# Patient Record
Sex: FEMALE | Race: WHITE | Employment: UNEMPLOYED | URBAN - METROPOLITAN AREA
[De-identification: names, ages, dates, MRNs, and addresses within clinical notes are randomized per-mention and may not be internally consistent; named-entity substitution may affect disease eponyms.]

---

## 2018-06-15 ENCOUNTER — HOSPITAL ENCOUNTER (INPATIENT)
Age: 78
LOS: 1 days | Discharge: ANOTHER HOSPITAL | DRG: 057 | End: 2018-06-16
Attending: PSYCHIATRY & NEUROLOGY | Admitting: EMERGENCY MEDICINE
Payer: MEDICARE

## 2018-06-15 PROBLEM — G30.9 DEMENTIA DUE TO ALZHEIMER'S DISEASE (HCC): Status: ACTIVE | Noted: 2018-06-15

## 2018-06-15 PROBLEM — R46.89: Status: ACTIVE | Noted: 2018-06-15

## 2018-06-15 PROBLEM — F02.80 DEMENTIA DUE TO ALZHEIMER'S DISEASE (HCC): Status: ACTIVE | Noted: 2018-06-15

## 2018-06-15 PROCEDURE — 65220000003 HC RM SEMIPRIVATE PSYCH

## 2018-06-15 PROCEDURE — 74011250637 HC RX REV CODE- 250/637: Performed by: INTERNAL MEDICINE

## 2018-06-15 RX ORDER — OLANZAPINE 2.5 MG/1
2.5 TABLET ORAL
Status: DISCONTINUED | OUTPATIENT
Start: 2018-06-15 | End: 2018-06-17 | Stop reason: HOSPADM

## 2018-06-15 RX ORDER — LORATADINE 10 MG/1
10 TABLET ORAL DAILY
Status: DISCONTINUED | OUTPATIENT
Start: 2018-06-16 | End: 2018-06-17 | Stop reason: HOSPADM

## 2018-06-15 RX ORDER — ACETAMINOPHEN 325 MG/1
650 TABLET ORAL
Status: DISCONTINUED | OUTPATIENT
Start: 2018-06-15 | End: 2018-06-17

## 2018-06-15 RX ORDER — FAMOTIDINE 20 MG/1
20 TABLET, FILM COATED ORAL 2 TIMES DAILY
Status: DISCONTINUED | OUTPATIENT
Start: 2018-06-15 | End: 2018-06-15

## 2018-06-15 RX ORDER — CARVEDILOL 12.5 MG/1
12.5 TABLET ORAL 2 TIMES DAILY WITH MEALS
Status: DISCONTINUED | OUTPATIENT
Start: 2018-06-15 | End: 2018-06-16

## 2018-06-15 RX ORDER — FUROSEMIDE 20 MG/1
20 TABLET ORAL
COMMUNITY
End: 2018-07-30

## 2018-06-15 RX ORDER — BENZTROPINE MESYLATE 1 MG/ML
1 INJECTION INTRAMUSCULAR; INTRAVENOUS
Status: DISCONTINUED | OUTPATIENT
Start: 2018-06-15 | End: 2018-06-17 | Stop reason: HOSPADM

## 2018-06-15 RX ORDER — GUAIFENESIN 100 MG/5ML
81 LIQUID (ML) ORAL DAILY
COMMUNITY

## 2018-06-15 RX ORDER — CARVEDILOL 12.5 MG/1
TABLET ORAL 2 TIMES DAILY WITH MEALS
Status: ON HOLD | COMMUNITY
End: 2018-06-17 | Stop reason: DRUGHIGH

## 2018-06-15 RX ORDER — FAMOTIDINE 20 MG/1
20 TABLET, FILM COATED ORAL DAILY
COMMUNITY

## 2018-06-15 RX ORDER — DILTIAZEM HYDROCHLORIDE 120 MG/1
120 CAPSULE, COATED, EXTENDED RELEASE ORAL DAILY
Status: DISCONTINUED | OUTPATIENT
Start: 2018-06-16 | End: 2018-06-17 | Stop reason: HOSPADM

## 2018-06-15 RX ORDER — DILTIAZEM HYDROCHLORIDE 120 MG/1
120 CAPSULE, EXTENDED RELEASE ORAL DAILY
COMMUNITY

## 2018-06-15 RX ORDER — ZOLPIDEM TARTRATE 5 MG/1
5 TABLET ORAL
Status: DISCONTINUED | OUTPATIENT
Start: 2018-06-15 | End: 2018-06-17 | Stop reason: HOSPADM

## 2018-06-15 RX ORDER — QUETIAPINE FUMARATE 100 MG/1
12.5 TABLET, FILM COATED ORAL DAILY
Status: ON HOLD | COMMUNITY
End: 2018-06-17 | Stop reason: DRUGHIGH

## 2018-06-15 RX ORDER — QUETIAPINE FUMARATE 25 MG/1
25 TABLET, FILM COATED ORAL
COMMUNITY
End: 2018-07-30

## 2018-06-15 RX ORDER — GUAIFENESIN 100 MG/5ML
81 LIQUID (ML) ORAL DAILY
Status: DISCONTINUED | OUTPATIENT
Start: 2018-06-16 | End: 2018-06-15

## 2018-06-15 RX ORDER — IBUPROFEN 200 MG
1 TABLET ORAL
Status: DISCONTINUED | OUTPATIENT
Start: 2018-06-15 | End: 2018-06-17 | Stop reason: HOSPADM

## 2018-06-15 RX ORDER — FUROSEMIDE 20 MG/1
20 TABLET ORAL DAILY
Status: DISCONTINUED | OUTPATIENT
Start: 2018-06-16 | End: 2018-06-17 | Stop reason: HOSPADM

## 2018-06-15 RX ORDER — ADHESIVE BANDAGE
30 BANDAGE TOPICAL DAILY PRN
Status: DISCONTINUED | OUTPATIENT
Start: 2018-06-15 | End: 2018-06-17 | Stop reason: HOSPADM

## 2018-06-15 RX ORDER — FAMOTIDINE 20 MG/1
20 TABLET, FILM COATED ORAL DAILY
Status: DISCONTINUED | OUTPATIENT
Start: 2018-06-16 | End: 2018-06-17 | Stop reason: HOSPADM

## 2018-06-15 RX ORDER — GUAIFENESIN 100 MG/5ML
81 LIQUID (ML) ORAL DAILY
Status: DISCONTINUED | OUTPATIENT
Start: 2018-06-16 | End: 2018-06-17 | Stop reason: HOSPADM

## 2018-06-15 RX ORDER — LORATADINE 10 MG/1
10 TABLET ORAL
COMMUNITY
End: 2018-07-30

## 2018-06-15 RX ORDER — BENZTROPINE MESYLATE 1 MG/1
1 TABLET ORAL
Status: DISCONTINUED | OUTPATIENT
Start: 2018-06-15 | End: 2018-06-17 | Stop reason: HOSPADM

## 2018-06-15 RX ADMIN — CARVEDILOL 12.5 MG: 12.5 TABLET, FILM COATED ORAL at 18:45

## 2018-06-15 NOTE — BH NOTES
Pt daughter called to check on pt she left her name and number also left pt other daughter number just in case there was no answer on phone. One of pt daughter's name is Ivonne Sawyer the other daughter name is Socorro Jett. Daughter will like to be called during time of court hearing. Number will be inside pt chart.

## 2018-06-15 NOTE — BH NOTES
Problem: Suicide/Homicide (Adult/Pediatric)  Goal: *STG: Remains safe in hospital  Outcome: Progressing Towards Goal  Pt is visible in the milieu this evening shift. Pt presents as cooperative but showing a lot of dementia confusions. Pt is meds/meals compliant. Pt is feeding self and eating well in the unit. Will continue to monitor q 15 for safety, mood and behavior changes.

## 2018-06-15 NOTE — BH NOTES
Pt is a TDO from a LTC nursing in [facility]. Pt has dementia. Pt is alert but only oriented to herself. Pt does not know her age. Pt was cooperative with admission. Body search completed by [facility], RN has revealed no broken skins. Pt has serious RA among many other medical conditions. Her fingers and toes are deformed as a result. Will monitor q 15 for safety.

## 2018-06-15 NOTE — H&P
History and Physical    Subjective:     Aleida Castillo is a 68 y.o. with past medical hx significant for Alzheimer dementia, Arthritis, CAD, CHF, MI, fibromyalgia, GERD, RA, anxiety disorder. Pt is hospitalized at [facility] as a transfer from [facility]. Pt is admitted to [facility] psych lu for being a danger to others and self due to unspecified dementia with behavior disturbance. Pt denies any acute medical issues. She denies any chest pain or shortness of breath. She denies any fever or chills. Pt is showing no signs of acute distress. Pt denies any acute issues. PMHx: Alzheimer disease, RA, CAD, CHF, MI, Fibromyalgia, GERD, anxiety disorder, allergies. PSHx: CABG    FHx;not available. Social History   Substance Use Topics    Smoking status: Quit cigarrette several years ago    Smokeless tobacco: none    Alcohol use None     Lives in adult home    Prior to Admission medications    Medication Sig Start Date End Date Taking? Authorizing Provider   aspirin 81 mg chewable tablet Take 81 mg by mouth daily. Indications: myocardial infarction prevention   Yes Historical Provider   carvedilol (COREG) 12.5 mg tablet Take  by mouth two (2) times daily (with meals). Indications: VENTRICULAR RATE CONTROL IN ATRIAL FIBRILLATION   Yes Historical Provider   dilTIAZem XR (DILACOR XR) 120 mg XR capsule Take 120 mg by mouth two (2) times a day. Indications: hypertension, VENTRICULAR RATE CONTROL IN ATRIAL FIBRILLATION   Yes Historical Provider   furosemide (LASIX) 20 mg tablet Take 20 mg by mouth daily. Indications: Edema, hypertension   Yes Historical Provider   loratadine (CLARITIN) 10 mg tablet Take 10 mg by mouth daily. Indications: Allergic Rhinitis, SNEEZING   Yes Historical Provider   famotidine (PEPCID) 20 mg tablet Take 20 mg by mouth two (2) times a day. Indications: Heartburn   Yes Historical Provider   QUEtiapine (SEROQUEL) 25 mg tablet Take 25 mg by mouth nightly.  Indications: Generalized Anxiety Disorder Yes Historical Provider   QUEtiapine (SEROQUEL) 100 mg tablet Take 12.5 mg by mouth daily. Indications: Generalized Anxiety Disorder, Rhoda associated with Bipolar Disorder   Yes Historical Provider     Allergies   Allergen Reactions    Cephalosporins Itching    Codeine Hives    Erythromycin Hives    Nubain [Nalbuphine] Hives    Pcn [Penicillins] Hives    Percodan [Oxycodone Hcl-Oxycodone-Asa] Hives    Promethazine Hives    Protonix [Pantoprazole] Diarrhea    Sulfa (Sulfonamide Antibiotics) Hives    Toradol [Ketorolac] Hives    Ultram [Tramadol] Hives    Valium [Diazepam] Hives    Prilosec [Omeprazole] Itching        Review of Systems:  Constitutional: negative  Eyes: negative  Ears, Nose, Mouth, Throat, and Face: negative  Respiratory: negative  Cardiovascular: negative  Gastrointestinal: negative  Genitourinary:negative  Integument/Breast: negative  Hematologic/Lymphatic: negative  Musculoskeletal:negative  Neurological: negative  Behavioral/Psychiatric: unspecified dementia with behavioral disturbances. Endocrine: negative  Allergic/Immunologic: negative     Objective: Intake and Output:            Physical Exam:   Visit Vitals    /77    Pulse 67    Temp 97.2 °F (36.2 °C)    Resp 18    SpO2 96%    Breastfeeding No     General:  Alert, cooperative, no distress, appears stated age. Head:  Normocephalic, without obvious abnormality, atraumatic. Eyes:  Conjunctivae/corneas clear. PERRL, EOMs intact. Ears:  Normal external ear canals both ears. Nose: Nares normal. Septum midline. Mucosa normal. No drainage or sinus tenderness. Throat: Lips, mucosa, and tongue normal. Teeth and gums normal.   Neck: Supple, symmetrical, trachea midline, no adenopathy, thyroid: no enlargement/tenderness/nodules. Back:   Symmetric, no curvature. ROM normal. No CVA tenderness. Lungs:   Clear to auscultation bilaterally. Chest wall:  No tenderness or deformity.    Heart:  Regular rate and rhythm, S1, S2 normal, no murmur, click, rub or gallop. Abdomen:   Soft, non-tender. Bowel sounds normal. No masses,  No organomegaly. Extremities: Sever deformity of UEs and LEs   Pulses: Distal pulses intact. Skin: Skin color, texture, turgor normal. No rashes or lesions   Lymph nodes: Cervical, supraclavicular, and axillary nodes normal.   Neurologic: CNII-XII intact. Normal strength, sensation intact, distal pulses intact, Sun skin demage. Data Review:   No results found for this or any previous visit (from the past 24 hour(s)). 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    Signed By: Mendoza Beck MD     Alondra 15, 2018

## 2018-06-15 NOTE — BH NOTES
GROUP THERAPY PROGRESS NOTE    The patient Caren lew 68 y.o. female is participating in [employer]. Group time: 1 hour    Personal goal for participation:  To concentrate on selected task    Goal orientation: social    Group therapy participation: active    Therapeutic interventions reviewed and discussed: Crafts, games, music    Impression of participation: The patient was attentive-good concentration on selected task    Sadia Rodriguez  6/15/2018  5:19 PM

## 2018-06-16 VITALS
TEMPERATURE: 97.6 F | RESPIRATION RATE: 17 BRPM | SYSTOLIC BLOOD PRESSURE: 99 MMHG | DIASTOLIC BLOOD PRESSURE: 55 MMHG | HEART RATE: 81 BPM | OXYGEN SATURATION: 98 %

## 2018-06-16 PROBLEM — I95.9 HYPOTENSION: Status: ACTIVE | Noted: 2018-06-16

## 2018-06-16 PROCEDURE — 74011250637 HC RX REV CODE- 250/637: Performed by: PSYCHIATRY & NEUROLOGY

## 2018-06-16 PROCEDURE — 74011250636 HC RX REV CODE- 250/636: Performed by: EMERGENCY MEDICINE

## 2018-06-16 PROCEDURE — 74011250637 HC RX REV CODE- 250/637: Performed by: INTERNAL MEDICINE

## 2018-06-16 PROCEDURE — 65220000003 HC RM SEMIPRIVATE PSYCH

## 2018-06-16 RX ORDER — CARVEDILOL 3.12 MG/1
3.12 TABLET ORAL 2 TIMES DAILY WITH MEALS
Status: DISCONTINUED | OUTPATIENT
Start: 2018-06-17 | End: 2018-06-17 | Stop reason: HOSPADM

## 2018-06-16 RX ORDER — QUETIAPINE FUMARATE 25 MG/1
12.5 TABLET, FILM COATED ORAL DAILY
Status: DISCONTINUED | OUTPATIENT
Start: 2018-06-16 | End: 2018-06-16

## 2018-06-16 RX ORDER — QUETIAPINE FUMARATE 25 MG/1
25 TABLET, FILM COATED ORAL
Status: DISCONTINUED | OUTPATIENT
Start: 2018-06-16 | End: 2018-06-16

## 2018-06-16 RX ORDER — QUETIAPINE FUMARATE 25 MG/1
12.5 TABLET, FILM COATED ORAL 2 TIMES DAILY
Status: DISCONTINUED | OUTPATIENT
Start: 2018-06-17 | End: 2018-06-17 | Stop reason: HOSPADM

## 2018-06-16 RX ORDER — QUETIAPINE FUMARATE 25 MG/1
25 TABLET, FILM COATED ORAL EVERY EVENING
Status: DISCONTINUED | OUTPATIENT
Start: 2018-06-16 | End: 2018-06-16

## 2018-06-16 RX ADMIN — FAMOTIDINE 20 MG: 20 TABLET ORAL at 11:03

## 2018-06-16 RX ADMIN — FUROSEMIDE 20 MG: 20 TABLET ORAL at 11:03

## 2018-06-16 RX ADMIN — CARVEDILOL 12.5 MG: 12.5 TABLET, FILM COATED ORAL at 11:03

## 2018-06-16 RX ADMIN — QUETIAPINE FUMARATE 12.5 MG: 25 TABLET ORAL at 13:37

## 2018-06-16 RX ADMIN — DILTIAZEM HYDROCHLORIDE 120 MG: 120 CAPSULE, COATED, EXTENDED RELEASE ORAL at 11:03

## 2018-06-16 RX ADMIN — ASPIRIN 81 MG 81 MG: 81 TABLET ORAL at 11:03

## 2018-06-16 RX ADMIN — LORATADINE 10 MG: 10 TABLET ORAL at 11:03

## 2018-06-16 RX ADMIN — QUETIAPINE FUMARATE 25 MG: 25 TABLET ORAL at 16:36

## 2018-06-16 RX ADMIN — CARVEDILOL 12.5 MG: 12.5 TABLET, FILM COATED ORAL at 16:35

## 2018-06-16 RX ADMIN — SODIUM CHLORIDE 500 ML: 900 INJECTION, SOLUTION INTRAVENOUS at 22:37

## 2018-06-16 NOTE — BH NOTES
Problem: Suicide/Homicide (Adult/Pediatric)  Goal: *STG: Remains safe in hospital  Outcome: Progressing Towards Goal  Pt is visible in the milieu this day shift. Pt presents as confused and disoriented x 3 showing a lot of dementia confusions. Pt is meds/meals compliant. Pt is feeding self and eating well in the unit. pt wanders in the unit stating [facility] Pt is not directable at times.  Will continue to monitor q 15 for safety, mood and behavior changes.

## 2018-06-16 NOTE — PROGRESS NOTES
Problem: Altered Thought Process (Adult/Pediatric)  Goal: *STG: Remains safe in hospital  Outcome: Progressing Towards Goal  Pt alert, oriented to self, expressing delusions of grandeur. Mood is labile with congruent affect. Pt very close and responds to male pt. Pt had 1 episode of incontinence tonight. She was cleaned and linen changed. VSS. Staff will continue to monitor for health and safety.

## 2018-06-16 NOTE — PROGRESS NOTES
Problem: Altered Thought Process (Adult/Pediatric)  Goal: *STG: Remains safe in hospital  Outcome: Progressing Towards Goal  Pt slept 6 hours. Respirations even, no distress. Pt refused morning labs. Staff will continue to monitor for health and safety.

## 2018-06-17 ENCOUNTER — HOSPITAL ENCOUNTER (INPATIENT)
Age: 78
LOS: 43 days | Discharge: HOME | DRG: 056 | End: 2018-07-30
Attending: PSYCHIATRY & NEUROLOGY | Admitting: PSYCHIATRY & NEUROLOGY
Payer: MEDICARE

## 2018-06-17 ENCOUNTER — HOSPITAL ENCOUNTER (OUTPATIENT)
Age: 78
Setting detail: OBSERVATION
Discharge: ANOTHER HOSPITAL | End: 2018-06-17
Attending: EMERGENCY MEDICINE | Admitting: EMERGENCY MEDICINE
Payer: MEDICARE

## 2018-06-17 ENCOUNTER — APPOINTMENT (OUTPATIENT)
Dept: GENERAL RADIOLOGY | Age: 78
End: 2018-06-17
Attending: EMERGENCY MEDICINE
Payer: MEDICARE

## 2018-06-17 VITALS
OXYGEN SATURATION: 96 % | TEMPERATURE: 97.6 F | RESPIRATION RATE: 17 BRPM | DIASTOLIC BLOOD PRESSURE: 47 MMHG | SYSTOLIC BLOOD PRESSURE: 102 MMHG | HEART RATE: 70 BPM

## 2018-06-17 PROBLEM — F03.90 DEMENTIA (HCC): Status: ACTIVE | Noted: 2018-06-17

## 2018-06-17 LAB
ALBUMIN SERPL-MCNC: 2.7 G/DL (ref 3.5–5)
ALBUMIN/GLOB SERPL: 0.7 {RATIO} (ref 1.1–2.2)
ALP SERPL-CCNC: 102 U/L (ref 45–117)
ALT SERPL-CCNC: 11 U/L (ref 12–78)
ANION GAP SERPL CALC-SCNC: 9 MMOL/L (ref 5–15)
AST SERPL-CCNC: 17 U/L (ref 15–37)
ATRIAL RATE: 63 BPM
BASOPHILS # BLD: 0 K/UL (ref 0–0.1)
BASOPHILS NFR BLD: 1 % (ref 0–1)
BILIRUB SERPL-MCNC: 0.3 MG/DL (ref 0.2–1)
BUN SERPL-MCNC: 23 MG/DL (ref 6–20)
BUN/CREAT SERPL: 24 (ref 12–20)
CALCIUM SERPL-MCNC: 8.4 MG/DL (ref 8.5–10.1)
CALCULATED R AXIS, ECG10: 7 DEGREES
CALCULATED T AXIS, ECG11: 74 DEGREES
CHLORIDE SERPL-SCNC: 104 MMOL/L (ref 97–108)
CO2 SERPL-SCNC: 25 MMOL/L (ref 21–32)
CREAT SERPL-MCNC: 0.96 MG/DL (ref 0.55–1.02)
DIAGNOSIS, 93000: NORMAL
DIFFERENTIAL METHOD BLD: NORMAL
EOSINOPHIL # BLD: 0.3 K/UL (ref 0–0.4)
EOSINOPHIL NFR BLD: 5 % (ref 0–7)
ERYTHROCYTE [DISTWIDTH] IN BLOOD BY AUTOMATED COUNT: 14.4 % (ref 11.5–14.5)
GLOBULIN SER CALC-MCNC: 3.9 G/DL (ref 2–4)
GLUCOSE SERPL-MCNC: 128 MG/DL (ref 65–100)
HCT VFR BLD AUTO: 38.6 % (ref 35–47)
HGB BLD-MCNC: 12.6 G/DL (ref 11.5–16)
IMM GRANULOCYTES # BLD: 0 K/UL (ref 0–0.04)
IMM GRANULOCYTES NFR BLD AUTO: 0 % (ref 0–0.5)
LYMPHOCYTES # BLD: 3.1 K/UL (ref 0.8–3.5)
LYMPHOCYTES NFR BLD: 46 % (ref 12–49)
MCH RBC QN AUTO: 29.6 PG (ref 26–34)
MCHC RBC AUTO-ENTMCNC: 32.6 G/DL (ref 30–36.5)
MCV RBC AUTO: 90.8 FL (ref 80–99)
MONOCYTES # BLD: 0.6 K/UL (ref 0–1)
MONOCYTES NFR BLD: 9 % (ref 5–13)
NEUTS SEG # BLD: 2.6 K/UL (ref 1.8–8)
NEUTS SEG NFR BLD: 39 % (ref 32–75)
NRBC # BLD: 0 K/UL (ref 0–0.01)
NRBC BLD-RTO: 0 PER 100 WBC
PLATELET # BLD AUTO: 205 K/UL (ref 150–400)
PMV BLD AUTO: 10.9 FL (ref 8.9–12.9)
POTASSIUM SERPL-SCNC: 3.6 MMOL/L (ref 3.5–5.1)
PROT SERPL-MCNC: 6.6 G/DL (ref 6.4–8.2)
Q-T INTERVAL, ECG07: 424 MS
QRS DURATION, ECG06: 82 MS
QTC CALCULATION (BEZET), ECG08: 454 MS
RBC # BLD AUTO: 4.25 M/UL (ref 3.8–5.2)
SODIUM SERPL-SCNC: 138 MMOL/L (ref 136–145)
TROPONIN I SERPL-MCNC: <0.05 NG/ML
TSH SERPL DL<=0.05 MIU/L-ACNC: 3.44 UIU/ML (ref 0.36–3.74)
VENTRICULAR RATE, ECG03: 69 BPM
WBC # BLD AUTO: 6.7 K/UL (ref 3.6–11)

## 2018-06-17 PROCEDURE — 36415 COLL VENOUS BLD VENIPUNCTURE: CPT

## 2018-06-17 PROCEDURE — 85025 COMPLETE CBC W/AUTO DIFF WBC: CPT

## 2018-06-17 PROCEDURE — 93005 ELECTROCARDIOGRAM TRACING: CPT

## 2018-06-17 PROCEDURE — 99218 HC RM OBSERVATION: CPT

## 2018-06-17 PROCEDURE — 71045 X-RAY EXAM CHEST 1 VIEW: CPT

## 2018-06-17 PROCEDURE — 96360 HYDRATION IV INFUSION INIT: CPT

## 2018-06-17 PROCEDURE — 74011250637 HC RX REV CODE- 250/637: Performed by: EMERGENCY MEDICINE

## 2018-06-17 PROCEDURE — 84484 ASSAY OF TROPONIN QUANT: CPT

## 2018-06-17 PROCEDURE — 74011250636 HC RX REV CODE- 250/636: Performed by: HOSPITALIST

## 2018-06-17 PROCEDURE — 74011250637 HC RX REV CODE- 250/637: Performed by: PSYCHIATRY & NEUROLOGY

## 2018-06-17 PROCEDURE — 65220000003 HC RM SEMIPRIVATE PSYCH

## 2018-06-17 PROCEDURE — 80053 COMPREHEN METABOLIC PANEL: CPT

## 2018-06-17 PROCEDURE — 74011250636 HC RX REV CODE- 250/636: Performed by: EMERGENCY MEDICINE

## 2018-06-17 PROCEDURE — 84443 ASSAY THYROID STIM HORMONE: CPT | Performed by: PSYCHIATRY & NEUROLOGY

## 2018-06-17 RX ORDER — QUETIAPINE FUMARATE 25 MG/1
12.5 TABLET, FILM COATED ORAL DAILY
COMMUNITY
End: 2018-07-30

## 2018-06-17 RX ORDER — ENOXAPARIN SODIUM 100 MG/ML
40 INJECTION SUBCUTANEOUS EVERY 24 HOURS
Status: DISCONTINUED | OUTPATIENT
Start: 2018-06-17 | End: 2018-06-17

## 2018-06-17 RX ORDER — ACETAMINOPHEN 325 MG/1
650 TABLET ORAL
Status: DISCONTINUED | OUTPATIENT
Start: 2018-06-17 | End: 2018-06-17 | Stop reason: HOSPADM

## 2018-06-17 RX ORDER — CARVEDILOL 3.12 MG/1
3.12 TABLET ORAL 2 TIMES DAILY WITH MEALS
Status: DISCONTINUED | OUTPATIENT
Start: 2018-06-18 | End: 2018-07-30 | Stop reason: HOSPADM

## 2018-06-17 RX ORDER — BENZTROPINE MESYLATE 1 MG/ML
1 INJECTION INTRAMUSCULAR; INTRAVENOUS
Status: DISCONTINUED | OUTPATIENT
Start: 2018-06-17 | End: 2018-07-30 | Stop reason: HOSPADM

## 2018-06-17 RX ORDER — SODIUM CHLORIDE 0.9 % (FLUSH) 0.9 %
5-10 SYRINGE (ML) INJECTION AS NEEDED
Status: DISCONTINUED | OUTPATIENT
Start: 2018-06-17 | End: 2018-06-17 | Stop reason: HOSPADM

## 2018-06-17 RX ORDER — FAMOTIDINE 20 MG/1
20 TABLET, FILM COATED ORAL 2 TIMES DAILY
Status: DISCONTINUED | OUTPATIENT
Start: 2018-06-17 | End: 2018-06-17 | Stop reason: HOSPADM

## 2018-06-17 RX ORDER — OLANZAPINE 2.5 MG/1
2.5 TABLET ORAL
Status: DISCONTINUED | OUTPATIENT
Start: 2018-06-17 | End: 2018-06-29

## 2018-06-17 RX ORDER — IBUPROFEN 200 MG
1 TABLET ORAL
Status: DISCONTINUED | OUTPATIENT
Start: 2018-06-17 | End: 2018-07-30 | Stop reason: HOSPADM

## 2018-06-17 RX ORDER — ACETAMINOPHEN 325 MG/1
650 TABLET ORAL
COMMUNITY

## 2018-06-17 RX ORDER — QUETIAPINE FUMARATE 25 MG/1
12.5 TABLET, FILM COATED ORAL DAILY
Status: DISCONTINUED | OUTPATIENT
Start: 2018-06-17 | End: 2018-06-17

## 2018-06-17 RX ORDER — GUAIFENESIN 100 MG/5ML
81 LIQUID (ML) ORAL DAILY
Status: DISCONTINUED | OUTPATIENT
Start: 2018-06-17 | End: 2018-06-17 | Stop reason: HOSPADM

## 2018-06-17 RX ORDER — ZOLPIDEM TARTRATE 5 MG/1
5 TABLET ORAL
Status: DISCONTINUED | OUTPATIENT
Start: 2018-06-17 | End: 2018-07-11

## 2018-06-17 RX ORDER — BENZTROPINE MESYLATE 1 MG/1
1 TABLET ORAL
Status: DISCONTINUED | OUTPATIENT
Start: 2018-06-17 | End: 2018-07-30 | Stop reason: HOSPADM

## 2018-06-17 RX ORDER — ADHESIVE BANDAGE
30 BANDAGE TOPICAL DAILY PRN
Status: DISCONTINUED | OUTPATIENT
Start: 2018-06-17 | End: 2018-07-30 | Stop reason: HOSPADM

## 2018-06-17 RX ORDER — SODIUM CHLORIDE 0.9 % (FLUSH) 0.9 %
5-10 SYRINGE (ML) INJECTION EVERY 8 HOURS
Status: DISCONTINUED | OUTPATIENT
Start: 2018-06-17 | End: 2018-06-17 | Stop reason: HOSPADM

## 2018-06-17 RX ORDER — DILTIAZEM HYDROCHLORIDE 120 MG/1
120 CAPSULE, COATED, EXTENDED RELEASE ORAL DAILY
Status: DISCONTINUED | OUTPATIENT
Start: 2018-06-18 | End: 2018-06-20

## 2018-06-17 RX ORDER — ENOXAPARIN SODIUM 100 MG/ML
30 INJECTION SUBCUTANEOUS EVERY 24 HOURS
Status: DISCONTINUED | OUTPATIENT
Start: 2018-06-17 | End: 2018-06-17 | Stop reason: HOSPADM

## 2018-06-17 RX ORDER — DICLOFENAC SODIUM 10 MG/G
2 GEL TOPICAL
COMMUNITY
End: 2018-07-30

## 2018-06-17 RX ORDER — FAMOTIDINE 20 MG/1
20 TABLET, FILM COATED ORAL DAILY
Status: DISCONTINUED | OUTPATIENT
Start: 2018-06-18 | End: 2018-07-30 | Stop reason: HOSPADM

## 2018-06-17 RX ORDER — ACETAMINOPHEN 325 MG/1
650 TABLET ORAL
Status: DISCONTINUED | OUTPATIENT
Start: 2018-06-17 | End: 2018-07-30 | Stop reason: HOSPADM

## 2018-06-17 RX ORDER — GUAIFENESIN 100 MG/5ML
81 LIQUID (ML) ORAL DAILY
Status: DISCONTINUED | OUTPATIENT
Start: 2018-06-18 | End: 2018-07-30 | Stop reason: HOSPADM

## 2018-06-17 RX ORDER — CARVEDILOL 3.12 MG/1
3.12 TABLET ORAL 2 TIMES DAILY WITH MEALS
COMMUNITY

## 2018-06-17 RX ADMIN — SODIUM CHLORIDE 500 ML: 900 INJECTION, SOLUTION INTRAVENOUS at 04:06

## 2018-06-17 RX ADMIN — Medication 10 ML: at 01:35

## 2018-06-17 RX ADMIN — ZOLPIDEM TARTRATE 5 MG: 5 TABLET ORAL at 22:57

## 2018-06-17 RX ADMIN — ASPIRIN 81 MG 81 MG: 81 TABLET ORAL at 12:16

## 2018-06-17 RX ADMIN — FAMOTIDINE 20 MG: 20 TABLET ORAL at 12:16

## 2018-06-17 RX ADMIN — Medication 10 ML: at 05:12

## 2018-06-17 NOTE — IP AVS SNAPSHOT
[facility] 
 
 
 [address] [facility] Patient: Hattie Fine 
MRN: [id number] About your hospitalization You were admitted on:  June 17, 2018 You last received care in the:  [facility]. 291 You were discharged on:  July 30, 2018 Why you were hospitalized Your primary diagnosis was:  Dementia Follow-up Information Follow up With Details Comments Contact Info Mesfin Stewart Go today Your will be residing here and your medication prescriptions were faxed to their facility where they will fill your prescriptions and continue medication management. Address: [address] Phone: [phone] Discharge Orders None A check hang indicates which time of day the medication should be taken. My Medications START taking these medications Instructions Each Dose to Equal  
 Morning Noon Evening Bedtime  
 memantine 10 mg tablet Commonly known as:  Stella Key Your last dose was: Your next dose is: Take 1 Tab by mouth two (2) times a day for 14 days. Indications: MODERATE TO SEVERE ALZHEIMER'S TYPE DEMENTIA 10 mg  
    
   
   
   
  
 nystatin powder Commonly known as:  MYCOSTATIN Your last dose was: Your next dose is:    
   
   
 Apply  to affected area two (2) times a day for 14 days. Apply to affected area bid  Indications: Diaper Rash  
     
   
   
   
  
 risperiDONE 1 mg/mL oral solution Commonly known as:  RisperDAL Your last dose was: Your next dose is: Take 1 mL by mouth nightly for 14 days. Indications: behavior sx with dementia  
 1 mg CHANGE how you take these medications Instructions Each Dose to Equal  
 Morning Noon Evening Bedtime * aspirin 81 mg chewable tablet What changed:  Another medication with the same name was added. Make sure you understand how and when to take each. Your last dose was: Your next dose is: Take 81 mg by mouth daily. Indications: myocardial infarction prevention 81 mg  
    
   
   
   
  
 * aspirin 81 mg chewable tablet Start taking on:  7/31/2018 What changed: You were already taking a medication with the same name, and this prescription was added. Make sure you understand how and when to take each. Your last dose was: Your next dose is: Take 1 Tab by mouth daily for 14 days. Indications: myocardial infarction prevention 81 mg  
    
   
   
   
  
 * carvedilol 3.125 mg tablet Commonly known as:  Corey Blevins What changed:  Another medication with the same name was added. Make sure you understand how and when to take each. Your last dose was: Your next dose is: Take 3.125 mg by mouth two (2) times daily (with meals). 3.125 mg  
    
   
   
   
  
 * carvedilol 3.125 mg tablet Commonly known as:  Coreydonna Blevins What changed: You were already taking a medication with the same name, and this prescription was added. Make sure you understand how and when to take each. Your last dose was: Your next dose is: Take 1 Tab by mouth two (2) times daily (with meals) for 14 days. Indications: PREVENTION OF A. FIB POST CARDIO-THORACIC SURGERY  
 3.125 mg  
    
   
   
   
  
 * dilTIAZem  mg XR capsule Commonly known as:  DILACOR XR What changed:  Another medication with the same name was added. Make sure you understand how and when to take each. Your last dose was: Your next dose is: Take 120 mg by mouth daily. Indications: hypertension, VENTRICULAR RATE CONTROL IN ATRIAL FIBRILLATION  
 120 mg  
    
   
   
   
  
 * dilTIAZem  mg ER capsule Commonly known as:  CARDIZEM CD  
 Start taking on:  7/31/2018 What changed: You were already taking a medication with the same name, and this prescription was added. Make sure you understand how and when to take each. Your last dose was: Your next dose is: Take 1 Cap by mouth daily for 14 days. Indications: hypertension 120 mg  
    
   
   
   
  
 * PEPCID 20 mg tablet Generic drug:  famotidine What changed:  Another medication with the same name was added. Make sure you understand how and when to take each. Your last dose was: Your next dose is: Take 20 mg by mouth daily. Indications: Heartburn  
 20 mg  
    
   
   
   
  
 * famotidine 20 mg tablet Commonly known as:  PEPCID Start taking on:  7/31/2018 What changed: You were already taking a medication with the same name, and this prescription was added. Make sure you understand how and when to take each. Your last dose was: Your next dose is: Take 1 Tab by mouth daily for 14 days. Indications: Heartburn  
 20 mg  
    
   
   
   
  
 * Notice: This list has 8 medication(s) that are the same as other medications prescribed for you. Read the directions carefully, and ask your doctor or other care provider to review them with you. STOP taking these medications ASPERCREME HEAT 10 % Gel Generic drug:  Menthol LASIX 20 mg tablet Generic drug:  furosemide  
   
  
 loratadine 10 mg tablet Commonly known as:  CLARITIN  
   
  
 QUEtiapine 25 mg tablet Commonly known as:  SEROquel SEROquel 25 mg tablet Generic drug:  QUEtiapine VOLTAREN 1 % Gel Generic drug:  diclofenac  
   
  
 WOMEN'S MULTIVITAMIN GUMMIES PO  
   
  
  
ASK your doctor about these medications Instructions Each Dose to Equal  
 Morning Noon Evening Bedtime  
 acetaminophen 325 mg tablet Commonly known as:  TYLENOL Your last dose was: Your next dose is: Take 650 mg by mouth every eight (8) hours as needed for Pain. 650 mg Where to Get Your Medications Information on where to get these meds will be given to you by the nurse or doctor. ! Ask your nurse or doctor about these medications  
  aspirin 81 mg chewable tablet  
 carvedilol 3.125 mg tablet  
 dilTIAZem  mg ER capsule  
 famotidine 20 mg tablet  
 memantine 10 mg tablet  
 nystatin powder  
 risperiDONE 1 mg/mL oral solution Discharge Instructions DISCHARGE SUMMARY from Nurse The following personal items are in your possession at time of discharge: 
 
Dental Appliances: None Visual Aid: None Home Medications: None Clothing: None Other Valuables: None Personal Items Sent to Safe: none PATIENT INSTRUCTIONS: 
 
If I feel that I can not keep these promises and I am at risk of hurting myself or others, I will call the crisis office and speak with a crisis worker who will assist me during my crisis. [address] [place] [address]  [phone] [address]   [phone] [facility]  [phone] [facility]  [phone] Lifestyle Tips: 
Appointment after discharge and follow up phone call: After being discharged, if your appointment does not work for you, please feel free to contact the physician's office to change the appointment. Medications: Take your medicines exactly as instructed. Ask your doctor or nurse if you have questions about your medicines. Tell your doctor right away if you have problems with your medicines. These are general instructions for a healthy lifestyle: No smoking/ No tobacco products/ Avoid exposure to second hand smoke Surgeon General's Warning:  Quitting smoking now greatly reduces serious risk to your health.  
 
Obesity, smoking, and sedentary lifestyle greatly increases your risk for illness A healthy diet, regular physical exercise & weight monitoring are important for maintaining a healthy lifestyle Recognize signs and symptoms of STROKE: 
 
F-face looks uneven A-arms unable to move or move unevenly S-speech slurred or non-existent T-time-call 911 as soon as signs and symptoms begin-DO NOT go Back to bed or wait to see if you get better-TIME IS BRAIN. Warning Signs of HEART ATTACK Call 911 if you have these symptoms: 
? Chest discomfort. Most heart attacks involve discomfort in the center of the chest that lasts more than a few minutes, or that goes away and comes back. It can feel like uncomfortable pressure, squeezing, fullness, or pain. ? Discomfort in other areas of the upper body. Symptoms can include pain or discomfort in one or both arms, the back, neck, jaw, or stomach. ? Shortness of breath with or without chest discomfort. ? Other signs may include breaking out in a cold sweat, nausea, or lightheadedness. Don't wait more than five minutes to call [address]! Fast action can save your life. Calling 911 is almost always the fastest way to get lifesaving treatment. Emergency Medical Services staff can begin treatment when they arrive  up to an hour sooner than if someone gets to the hospital by car. Myself and/or my family have received education about my diagnosis throughout my hospital stay. During my hospital stay, I and/or my family/caregiver were included in planning my care upon discharge. My needs were taken into consideration and I was included in my discharge planning. I had an opportunity to ask questions. The discharge information has been reviewed with the patient. The patient verbalized understanding. Discharge medications reviewed with the patient and appropriate educational materials and side effects teaching were provided. Introducing [facility] SERVICES! [facility] introduces Aceablet patient portal. Now you can access parts of your medical record, email your doctor's office, and request medication refills online. 1. In your internet browser, go to [email or web address] 2. Click on the First Time User? Click Here link in the Sign In box. You will see the New Member Sign Up page. 3. Enter your Socialance Access Code exactly as it appears below. You will not need to use this code after youve completed the sign-up process. If you do not sign up before the expiration date, you must request a new code. · Socialance Access Code: [email or web address] Expires: 9/16/2018  6:31 PM 
 
4. Enter the last four digits of your Social Security Number (xxxx) and Date of Birth (mm/dd/yyyy) as indicated and click Submit. You will be taken to the next sign-up page. 5. Create a Socialance ID. This will be your Socialance login ID and cannot be changed, so think of one that is secure and easy to remember. 6. Create a Socialance password. You can change your password at any time. 7. Enter your Password Reset Question and Answer. This can be used at a later time if you forget your password. 8. Enter your e-mail address. You will receive e-mail notification when new information is available in [address] 9. Click Sign Up. You can now view and download portions of your medical record. 10. Click the Download Summary menu link to download a portable copy of your medical information. If you have questions, please visit the Frequently Asked Questions section of the Socialance website. Remember, Socialance is NOT to be used for urgent needs. For medical emergencies, dial 911. Now available from your iPhone and Android! Introducing Jermaine Savage As a [facility] patient, I wanted to make you aware of our electronic visit tool called Jermaine Savage. Guerline Distance 24/7 allows you to connect within minutes with a medical provider 24 hours a day, seven days a week via a mobile device or tablet or logging into a secure website from your computer. You can access Funifi from anywhere in the [place]. A virtual visit might be right for you when you have a simple condition and feel like you just dont want to get out of bed, or cant get away from work for an appointment, when your regular Melissa Bee provider is not available (evenings, weekends or holidays), or when youre out of town and need minor care. Electronic visits cost only $49 and if the SnapMyAd 24/7 provider determines a prescription is needed to treat your condition, one can be electronically transmitted to a nearby pharmacy*. Please take a moment to enroll today if you have not already done so. The enrollment process is free and takes just a few minutes. To enroll, please download the SnapMyAd 24/7 polina to your tablet or phone, or visit [email or web address] to enroll on your computer. And, as an [address] [place] [address] patient with a Usersnap account, the results of your visits will be scanned into your electronic medical record and your primary care provider will be able to view the scanned results. We urge you to continue to see your regular Melissa Bee provider for your ongoing medical care. And while your primary care provider may not be the one available when you seek a Jermaine Clineshashifin virtual visit, the peace of mind you get from getting a real diagnosis real time can be priceless. For more information on Instant AVshashifin, view our Frequently Asked Questions (FAQs) at [email or web address]. Sincerely, 
 
Nisa Dey MD 
Chief Medical Officer Ezekiel Fernandez *:  certain medications cannot be prescribed via Jermaine Apta [employer] Providers Seen During Your Hospitalization Provider Specialty Primary office phone Kate Quevedo MD Psychiatry [phone] Emiliano Owen MD Psychiatry [phone] Your Primary Care Physician (PCP) Primary Care Physician Office Phone Office Fax OTHER, PHYS ** None ** ** None ** You are allergic to the following Allergen Reactions Cephalosporins Itching Codeine Hives Erythromycin Hives Nubain (Nalbuphine) Hives Pcn (Penicillins) Hives Percodan (Oxycodone Hcl-Oxycodone-Asa) Hives Promethazine Hives Protonix (Pantoprazole) Diarrhea Sulfa (Sulfonamide Antibiotics) Hives Toradol (Ketorolac) Hives Ultram (Tramadol) Hives Valium (Diazepam) Hives Prilosec (Omeprazole) Itching Recent Documentation Height Weight Breastfeeding? BMI  
  
 1.556 m 54.9 kg No 22.68 kg/m2 Emergency Contacts Name Discharge Info Relation Home Work Mobile No,One N/A  AT THIS TIME [6] Other Relative [6] [phone] Patient Belongings The following personal items are in your possession at time of discharge: 
  Dental Appliances: None  Visual Aid: None      Home Medications: None      Clothing: None    Other Valuables: None  Personal Items Sent to Safe: none Please provide this summary of care documentation to your next provider. Signatures-by signing, you are acknowledging that this After Visit Summary has been reviewed with you and you have received a copy. Patient Signature:  ____________________________________________________________ Date:  ____________________________________________________________  
  
Dania Hurt Provider Signature:  ____________________________________________________________ Date:  ____________________________________________________________

## 2018-06-17 NOTE — PROGRESS NOTES
Pt is being transferred to [address] [place] [address] for closer observation. Pt transferred in stable condition. No c/o dizziness or headache.

## 2018-06-17 NOTE — BH NOTES
Pt is readmitted back to the unit this afternoon from the medical unit after receiving IV fluid. Pt is alert but is disoriented x 3. She is oriented to her name only. Pt has long history of chronic heart and BP problems. Pt is assessed to be to be able to ambulate in the unit. Pt is able to feed self and no problem of her chewing and swallowing. Order received. Will monitor q 15 for safety.

## 2018-06-17 NOTE — H&P
GENERAL GENERIC H&P/CONSULT    Subjective: psych nurses note hypotension    68year old female with Alheimers disease transferred from a nursing home in [place] via TDO after she developed progressive agitation and aggressive behavior against her roommate. Her medical issues include CHF, AF, and CAD. She did not have BP taken today as she was aggressive as refused but did take her beta blocker and the diltiazem as well as Seroquel this evening. One hour after all her meds she was noted to have a BP in the 80s with bradycardia while sleeping. She was largely asymptomatic but despite 500cc NS, her BP did not rise. Just after decision to bring her down for labs and monitoring, her BP and HR came up spontaneously. Had her BP continued to fall, she would have received glucagon to override the betablocker but her issues resolved. Labs were sent to assess her creatinine and volume status. Clinically she appeared somewhat dry but was drinking fluid. No past medical history on file. No past surgical history on file. Prior to Admission medications    Medication Sig Start Date End Date Taking? Authorizing Provider   aspirin 81 mg chewable tablet Take 81 mg by mouth daily. Indications: myocardial infarction prevention    Historical Provider   carvedilol (COREG) 12.5 mg tablet Take  by mouth two (2) times daily (with meals). Indications: VENTRICULAR RATE CONTROL IN ATRIAL FIBRILLATION    Historical Provider   dilTIAZem XR (DILACOR XR) 120 mg XR capsule Take 120 mg by mouth daily. Indications: hypertension, VENTRICULAR RATE CONTROL IN ATRIAL FIBRILLATION    Historical Provider   furosemide (LASIX) 20 mg tablet Take 20 mg by mouth daily. Indications: Edema, hypertension    Historical Provider   loratadine (CLARITIN) 10 mg tablet Take 10 mg by mouth daily. Indications: Allergic Rhinitis, SNEEZING    Historical Provider   famotidine (PEPCID) 20 mg tablet Take 20 mg by mouth two (2) times a day.  Indications: Heartburn Historical Provider   QUEtiapine (SEROQUEL) 25 mg tablet Take 25 mg by mouth nightly. Indications: Generalized Anxiety Disorder    Historical Provider   QUEtiapine (SEROQUEL) 100 mg tablet Take 12.5 mg by mouth daily. Indications: Generalized Anxiety Disorder, Rhoda associated with Bipolar Disorder    Historical Provider     Allergies   Allergen Reactions    Cephalosporins Itching    Codeine Hives    Erythromycin Hives    Nubain [Nalbuphine] Hives    Pcn [Penicillins] Hives    Percodan [Oxycodone Hcl-Oxycodone-Asa] Hives    Promethazine Hives    Protonix [Pantoprazole] Diarrhea    Sulfa (Sulfonamide Antibiotics) Hives    Toradol [Ketorolac] Hives    Ultram [Tramadol] Hives    Valium [Diazepam] Hives    Prilosec [Omeprazole] Itching      Social History   Substance Use Topics    Smoking status: Not on file    Smokeless tobacco: Not on file    Alcohol use Not on file      No family history on file. Review of Systems   Unable to perform ROS: Dementia       Objective:          Patient Vitals for the past 8 hrs:   BP Pulse Resp SpO2   06/17/18 0023 108/79 87 14 96 %     Physical Exam   Nursing note and vitals reviewed. Constitutional: She appears well-developed. No distress. HENT:   Head: Normocephalic. Eyes: Pupils are equal, round, and reactive to light. Neck: Normal range of motion. Cardiovascular: Normal rate, regular rhythm and normal heart sounds. Pulmonary/Chest: Breath sounds normal. She has no wheezes. She has no rales. Musculoskeletal: Normal range of motion. She exhibits no edema. Neurological: She is alert. Skin: Skin is warm and dry. Psychiatric: She has a normal mood and affect. Sipping tea, calm, was agitated and aggressive earlier        Labs:  No results found for this or any previous visit (from the past 24 hour(s)). Assessment:  Active Problems:    Hypotension (6/16/2018)      Dementia (6/17/2018)        Plan: Monitor overnight.   Hold B blocker, lasix and diltiazem for now. Add back as tolerated. Have ordered labs, cxr and EKG. Suspect this is overmedication and patient will be able to return to psych.       Signed:  Yared Cerna MD 6/17/2018

## 2018-06-17 NOTE — H&P
Hospitalist Admission Note    NAME: Giovana Butterfield   :  1940   MRN:  [id number]     Date/Time:  2018 9:49 AM    Patient PCP: Berenice Spears MD  ______________________________________________________________________  Given the patient's current clinical presentation, I have a high level of concern for decompensation if discharged from the emergency department. Complex decision making was performed, which includes reviewing the patient's available past medical records, laboratory results, and x-ray films. My assessment of this patient's clinical condition and my plan of care is as follows. Assessment / Plan:    Hypotension: POA  likely medication related, reviewed home BP meds, she is supposed to be on coreg 3.125 bid and she was receiving 12.5 mg BID coreg in hospital  -no vitals checks in 24 hours prior to medication administration as per EMR  received 500 cc bolus NS, she is asymptomatic, holding all BP meds, last BP documented 110/63  -If BP remains stable, will transfer back TO U later this afternoon  A fib, suspect paroxymal  -currently in NSR, on baby aspirin  -will resume Cardizem and coreg later as tolerated     Code Status: DNR (advance directives is in her OSH chart and confirmed with daughter)  Surrogate Decision Maker: Ivonne Kat  [facility] [phone]    DVT Prophylaxis: lovenox  GI Prophylaxis: not indicated    Baseline: lives in NH, has marked dementia      Subjective:   CHIEF COMPLAINT: low BP    HISTORY OF PRESENT ILLNESS:   Pt had dementia and unable to provide any purposeful information. Called Dr. Kenia Frazier and reviewed medical records from OSH to obtain history. Pt was admitted to [facility] on 6/15/2018 for aggressive behavior. AS Per records,   Giovana Butterfield is a 68 y.o.   Female Alzheimers dementia, CHD, CAD, A fib ( Not documented in our records, information obtained from OSH records), was transferred from a nursing home in [place] via TDO after she developed progressive agitation and aggressive behavior. As per records there was no record of vitals in 24 hours, apparently she did not have BP taken yesterday as she was aggressive and refused but took coreg, diltiazem as well as Seroquel later in the evening without knowing her BP at that time. One hour after all her meds she was noted to have a BP in the 80s with bradycardia while sleeping. She was asymptomatic and given 500cc NS, her BP did not improve, so she was transfered to medical floor for monitoring. Off note Pt is DNR as per advance directive in her records. Since on medical floor her BP has improved without further intervention, pt is awake and alert.     We were asked to admit for work up and evaluation of the above problems. No past medical history on file. No past surgical history on file. Social History   Substance Use Topics    Smoking status: Not on file    Smokeless tobacco: Not on file    Alcohol use Not on file        Family history .+ HTN, heart disease as per family  Allergies   Allergen Reactions    Cephalosporins Itching    Codeine Hives    Erythromycin Hives    Nubain [Nalbuphine] Hives    Pcn [Penicillins] Hives    Percodan [Oxycodone Hcl-Oxycodone-Asa] Hives    Promethazine Hives    Protonix [Pantoprazole] Diarrhea    Sulfa (Sulfonamide Antibiotics) Hives    Toradol [Ketorolac] Hives    Ultram [Tramadol] Hives    Valium [Diazepam] Hives    Prilosec [Omeprazole] Itching        Prior to Admission medications    Medication Sig Start Date End Date Taking? Authorizing Provider   aspirin 81 mg chewable tablet Take 81 mg by mouth daily. Indications: myocardial infarction prevention    Historical Provider   carvedilol (COREG) 12.5 mg tablet Take  by mouth two (2) times daily (with meals). Indications: VENTRICULAR RATE CONTROL IN ATRIAL FIBRILLATION    Historical Provider   dilTIAZem XR (DILACOR XR) 120 mg XR capsule Take 120 mg by mouth daily.  Indications: hypertension, VENTRICULAR RATE CONTROL IN ATRIAL FIBRILLATION    Historical Provider   furosemide (LASIX) 20 mg tablet Take 20 mg by mouth daily. Indications: Edema, hypertension    Historical Provider   loratadine (CLARITIN) 10 mg tablet Take 10 mg by mouth daily. Indications: Allergic Rhinitis, SNEEZING    Historical Provider   famotidine (PEPCID) 20 mg tablet Take 20 mg by mouth two (2) times a day. Indications: Heartburn    Historical Provider   QUEtiapine (SEROQUEL) 25 mg tablet Take 25 mg by mouth nightly. Indications: Generalized Anxiety Disorder    Historical Provider   QUEtiapine (SEROQUEL) 100 mg tablet Take 12.5 mg by mouth daily. Indications: Generalized Anxiety Disorder, Rhoda associated with Bipolar Disorder    Historical Provider       REVIEW OF SYSTEMS:     I am not able to complete the review of systems because:    The patient is intubated and sedated    The patient has altered mental status due to his acute medical problems    The patient has baseline aphasia from prior stroke(s)    The patient has baseline dementia and is not reliable historian    The patient is in acute medical distress and unable to provide information           Total of 12 systems reviewed as follows:  Poor historian, but refuses any acute complaints     POSITIVE= underlined text  Negative = text not underlined  General:  fever, chills, sweats, generalized weakness, weight loss/gain,      loss of appetite   Eyes:    blurred vision, eye pain, loss of vision, double vision  ENT:    rhinorrhea, pharyngitis   Respiratory:   cough, sputum production, SOB, DICK, wheezing, pleuritic pain   Cardiology:   chest pain, palpitations, orthopnea, PND, edema, syncope   Gastrointestinal:  abdominal pain , N/V, diarrhea, dysphagia, constipation, bleeding   Genitourinary:  frequency, urgency, dysuria, hematuria, incontinence   Muskuloskeletal :  arthralgia, myalgia, back pain  Hematology:  easy bruising, nose or gum bleeding, lymphadenopathy Dermatological: rash, ulceration, pruritis, color change / jaundice  Endocrine:   hot flashes or polydipsia   Neurological:  headache, dizziness, confusion, focal weakness, paresthesia,     Speech difficulties, memory loss, gait difficulty  Psychological: Feelings of anxiety, depression, agitation    Objective:   VITALS:    Visit Vitals    /63    Pulse 73    Temp 97 °F (36.1 °C)    Resp 15    SpO2 96%       PHYSICAL EXAM:    General:    Alert, cooperative, no distress, appears stated age. HEENT: Atraumatic, anicteric sclerae, pink conjunctivae     No oral ulcers, mucosa moist, throat clear, dentition fair  Neck:  Supple, symmetrical,  thyroid: non tender  Lungs:   Clear to auscultation bilaterally. No Wheezing or Rhonchi. No rales. Chest wall:  No tenderness  No Accessory muscle use. Heart:   Regular  rhythm,  No  murmur   No edema  Abdomen:   Soft, non-tender. Not distended. Bowel sounds normal  Extremities: No cyanosis. No clubbing,      Skin turgor normal, Capillary refill normal, Radial dial pulse 2+  Skin:     Not pale. Not Jaundiced  No rashes   Psych:  Poor insight. Not depressed. Not anxious or agitated. Neurologic: EOMs intact. No facial asymmetry. No aphasia or slurred speech.  Symmetrical strength, Sensation grossly intact.    _______________________________________________________________________  Care Plan discussed with:    Comments   Patient x    Family  x Daughter Ivonne   RN     Care Manager                    Consultant:      _______________________________________________________________________  Expected  Disposition:   Home with Family x   HH/PT/OT/RN    SNF/LTC    [facility]    ________________________________________________________________________  TOTAL TIME:  72 Minutes    Critical Care Provided     Minutes non procedure based      Comments     Reviewed previous records   >50% of visit spent in counseling and coordination of care  Discussion with patient and/or family and questions answered       ________________________________________________________________________  Signed: Jordan Altamirano MD    Procedures: see electronic medical records for all procedures/Xrays and details which were not copied into this note but were reviewed prior to creation of Plan. LAB DATA REVIEWED:    Recent Results (from the past 24 hour(s))   TSH 3RD GENERATION    Collection Time: 06/17/18  1:29 AM   Result Value Ref Range    TSH 3.44 0.36 - 3.74 uIU/mL   CBC WITH AUTOMATED DIFF    Collection Time: 06/17/18  1:29 AM   Result Value Ref Range    WBC 6.7 3.6 - 11.0 K/uL    RBC 4.25 3.80 - 5.20 M/uL    HGB 12.6 11.5 - 16.0 g/dL    HCT 38.6 35.0 - 47.0 %    MCV 90.8 80.0 - 99.0 FL    MCH 29.6 26.0 - 34.0 PG    MCHC 32.6 30.0 - 36.5 g/dL    RDW 14.4 11.5 - 14.5 %    PLATELET 090 958 - 106 K/uL    MPV 10.9 8.9 - 12.9 FL    NRBC 0.0 0  WBC    ABSOLUTE NRBC 0.00 0.00 - 0.01 K/uL    NEUTROPHILS 39 32 - 75 %    LYMPHOCYTES 46 12 - 49 %    MONOCYTES 9 5 - 13 %    EOSINOPHILS 5 0 - 7 %    BASOPHILS 1 0 - 1 %    IMMATURE GRANULOCYTES 0 0.0 - 0.5 %    ABS. NEUTROPHILS 2.6 1.8 - 8.0 K/UL    ABS. LYMPHOCYTES 3.1 0.8 - 3.5 K/UL    ABS. MONOCYTES 0.6 0.0 - 1.0 K/UL    ABS. EOSINOPHILS 0.3 0.0 - 0.4 K/UL    ABS. BASOPHILS 0.0 0.0 - 0.1 K/UL    ABS. IMM. GRANS. 0.0 0.00 - 0.04 K/UL    DF AUTOMATED     METABOLIC PANEL, COMPREHENSIVE    Collection Time: 06/17/18  1:29 AM   Result Value Ref Range    Sodium 138 136 - 145 mmol/L    Potassium 3.6 3.5 - 5.1 mmol/L    Chloride 104 97 - 108 mmol/L    CO2 25 21 - 32 mmol/L    Anion gap 9 5 - 15 mmol/L    Glucose 128 (H) 65 - 100 mg/dL    BUN 23 (H) 6 - 20 MG/DL    Creatinine 0.96 0.55 - 1.02 MG/DL    BUN/Creatinine ratio 24 (H) 12 - 20      GFR est AA >60 >60 ml/min/1.73m2    GFR est non-AA 56 (L) >60 ml/min/1.73m2    Calcium 8.4 (L) 8.5 - 10.1 MG/DL    Bilirubin, total 0.3 0.2 - 1.0 MG/DL    ALT (SGPT) 11 (L) 12 - 78 U/L    AST (SGOT) 17 15 - 37 U/L    Alk.  phosphatase 102 45 - 117 U/L    Protein, total 6.6 6.4 - 8.2 g/dL    Albumin 2.7 (L) 3.5 - 5.0 g/dL    Globulin 3.9 2.0 - 4.0 g/dL    A-G Ratio 0.7 (L) 1.1 - 2.2     TROPONIN I    Collection Time: 06/17/18  1:29 AM   Result Value Ref Range    Troponin-I, Qt. <0.05 <0.05 ng/mL

## 2018-06-17 NOTE — PROGRESS NOTES
[facility] Medication Reconciliation     Findings:   1) Unable to obtain information via patient interview due to dementia  2) PTA list below updated per paper documents/records from transferring facility dated 6/15/18    Information obtained from: Transfer documentation (paper chart)    Past Medical History/Disease States:  No past medical history on file. Patient allergies: Allergies as of 06/17/2018 - Review Complete 06/15/2018   Allergen Reaction Noted    Cephalosporins Itching 06/15/2018    Codeine Hives 06/15/2018    Erythromycin Hives 06/15/2018    Nubain [nalbuphine] Hives 06/15/2018    Pcn [penicillins] Hives 06/15/2018    Percodan [oxycodone hcl-oxycodone-asa] Hives 06/15/2018    Promethazine Hives 06/15/2018    Protonix [pantoprazole] Diarrhea 06/15/2018    Sulfa (sulfonamide antibiotics) Hives 06/15/2018    Toradol [ketorolac] Hives 06/15/2018    Ultram [tramadol] Hives 06/15/2018    Valium [diazepam] Hives 06/15/2018    Prilosec [omeprazole] Itching 06/15/2018         Prior to Admission Medications   Prescriptions Last Dose Informant     Menthol (ASPERCREME HEAT) 10 % gel  Transfer Papers     Sig: by Apply Externally route every eight (8) hours as needed (arthritis). QUEtiapine (SEROQUEL) 25 mg tablet  Transfer Papers     Sig: Take 25 mg by mouth nightly. Indications: Generalized Anxiety Disorder   QUEtiapine (SEROQUEL) 25 mg tablet  Transfer Papers     Sig: Take 12.5 mg by mouth daily. acetaminophen (TYLENOL) 325 mg tablet  Transfer Papers     Sig: Take 650 mg by mouth every eight (8) hours as needed for Pain. aspirin 81 mg chewable tablet  Transfer Papers     Sig: Take 81 mg by mouth daily. Indications: myocardial infarction prevention   carvedilol (COREG) 3.125 mg tablet  Transfer Papers     Sig: Take 3.125 mg by mouth two (2) times daily (with meals).    diclofenac (VOLTAREN) 1 % gel  Transfer Papers     Sig: Apply 2 g to affected area every six (6) hours as needed (arthritis pain). dilTIAZem XR (DILACOR XR) 120 mg XR capsule  Transfer Papers     Sig: Take 120 mg by mouth daily. Indications: hypertension, VENTRICULAR RATE CONTROL IN ATRIAL FIBRILLATION   famotidine (PEPCID) 20 mg tablet  Transfer Papers     Sig: Take 20 mg by mouth daily. Indications: Heartburn   furosemide (LASIX) 20 mg tablet  Transfer Papers     Sig: Take 20 mg by mouth daily as needed. Indications: Edema, hypertension   loratadine (CLARITIN) 10 mg tablet  Transfer Papers     Sig: Take 10 mg by mouth daily as needed. Indications: Allergic Rhinitis, SNEEZING   multivit-minerals/folic acid (WOMEN'S MULTIVITAMIN GUMMIES PO)  Transfer Papers     Sig: Take 1 Tab by mouth daily.          Thank you,  Brody Partida, [facility]

## 2018-06-17 NOTE — DISCHARGE SUMMARY
Hospitalist Discharge Summary     Patient ID:  Benji Kate  [id number]  68 y.o.  1940    PCP on record: Berenice Spears MD    Admit date: 6/17/2018  Discharge date and time: 6/17/2018      DISCHARGE DIAGNOSIS:    Hypotension  likely medication related  A fib, suspect paroxymal  dementia  CAD  CHF      CONSULTATIONS:  None    Excerpted HPI from H&P of Jose Puente MD:  Pt had dementia and unable to provide any purposeful information. Called Dr. Marielos Oliveros and reviewed medical records from OSH to obtain history. Pt was admitted to [facility] on 6/15/2018 for aggressive behavior. AS Per records,   Benji Kate is a 68 y.o. Female Alzheimers dementia, CHD, CAD, A fib ( Not documented in our records, information obtained from OSH records), was transferred from a nursing home in [place] via TDO after she developed progressive agitation and aggressive behavior. As per records there was no record of vitals in 24 hours, apparently she did not have BP taken yesterday as she was aggressive and refused but took coreg, diltiazem as well as Seroquel later in the evening without knowing her BP at that time.  One hour after all her meds she was noted to have a BP in the 80s with bradycardia while sleeping.  She was asymptomatic and given 500cc NS, her BP did not improve, so she was transfered to medical floor for monitoring. Off note Pt is DNR as per advance directive in her records. Since on medical floor her BP has improved without further intervention, pt is awake and alert.      We were asked to admit for work up and evaluation of the above problems.        ______________________________________________________________________  DISCHARGE SUMMARY/HOSPITAL COURSE:  for full details see H&P, daily progress notes, labs, consult notes. Admit and discharge same day.  Hypotension resolved, will transfer back to psychiatry        _______________________________________________________________________  Patient seen and examined by me on discharge day. Pertinent Findings:  As per HPI  _______________________________________________________________________  DISCHARGE MEDICATIONS:   Current Discharge Medication List          My Recommended Diet, Activity, Wound Care, and follow-up labs are listed in the patient's Discharge Insturctions which I have personally completed and reviewed.     ______________________________________________________________________    Risk of deterioration: Low    Condition at Discharge:  Stable  ______________________________________________________________________    Disposition  [facility]  ______________________________________________________________________    Care Plan discussed with:   Patient, Family, RN    ______________________________________________________________________    Code Status: DNR/DNI  ______________________________________________________________________      Follow up with:   PCP : Berenice Spears MD  Follow-up Information     Follow up With Details Comments Contact Info    Berenice Spears MD   Patient can only remember the practice name and not the physician                Total time in minutes spent coordinating this discharge (includes going over instructions, follow-up, prescriptions, and preparing report for sign off to her PCP) :  30 minutes    Signed:  Eve Matamoros MD

## 2018-06-17 NOTE — H&P
INITIAL PSYCHIATRIC EVALUATION            IDENTIFICATION:    Patient Name  Eric Paredes   Date of Birth 1940   [facility] [id number]   Medical Record Number  [id number]      Age  68 y.o. PCP Berenice Spears, MD   Admit date:  6/15/2018    Room Number  321/01  @ [facility]   Date of Service  6/16/2018            HISTORY         REASON FOR HOSPITALIZATION:  CC: Pt admitted under a temporary senior care order (TDO with severe cognitive impairment and aggression proving to be an imminent danger to others and an inability to care for self. HISTORY OF PRESENT ILLNESS:    The patient, Eric Paredes, is a 68 y.o. WHITE [place]  female with a past psychiatric history significant for Dementia, who presents at this time with complaints of (and/or evidence of) the following emotional symptoms: agitation and delusions. Additional symptomatology include agitation, difficulty sleeping, increased irritability and poor concentration. The above symptoms have been present for two months. These symptoms are of high severity. These symptoms are constant and intermittent/ fleeting in nature. The patient's condition has been precipitated by unknown psychosocial stressors . Patient's condition made worse by treatment noncompliance. UDS: negative; BAL=0. Pt has h/o Dementia and is a R/O a Nursing Home in [place], [place]. She reportedly has been increasingly becoming aggressive towards her room-mate and other peers. She does not have capacity to consent and is orients to self only.       ALLERGIES:   Allergies   Allergen Reactions    Cephalosporins Itching    Codeine Hives    Erythromycin Hives    Nubain [Nalbuphine] Hives    Pcn [Penicillins] Hives    Percodan [Oxycodone Hcl-Oxycodone-Asa] Hives    Promethazine Hives    Protonix [Pantoprazole] Diarrhea    Sulfa (Sulfonamide Antibiotics) Hives    Toradol [Ketorolac] Hives    Ultram [Tramadol] Hives    Valium [Diazepam] Hives    Prilosec [Omeprazole] Itching      MEDICATIONS PRIOR TO ADMISSION:   Prescriptions Prior to Admission   Medication Sig    aspirin 81 mg chewable tablet Take 81 mg by mouth daily. Indications: myocardial infarction prevention    carvedilol (COREG) 12.5 mg tablet Take  by mouth two (2) times daily (with meals). Indications: VENTRICULAR RATE CONTROL IN ATRIAL FIBRILLATION    dilTIAZem XR (DILACOR XR) 120 mg XR capsule Take 120 mg by mouth daily. Indications: hypertension, VENTRICULAR RATE CONTROL IN ATRIAL FIBRILLATION    furosemide (LASIX) 20 mg tablet Take 20 mg by mouth daily. Indications: Edema, hypertension    loratadine (CLARITIN) 10 mg tablet Take 10 mg by mouth daily. Indications: Allergic Rhinitis, SNEEZING    famotidine (PEPCID) 20 mg tablet Take 20 mg by mouth two (2) times a day. Indications: Heartburn    QUEtiapine (SEROQUEL) 25 mg tablet Take 25 mg by mouth nightly. Indications: Generalized Anxiety Disorder    QUEtiapine (SEROQUEL) 100 mg tablet Take 12.5 mg by mouth daily. Indications: Generalized Anxiety Disorder, Rhoda associated with Bipolar Disorder      PAST MEDICAL HISTORY:   No past medical history on file. No past surgical history on file. SOCIAL HISTORY: Per  note   Social History     Social History    Marital status: SINGLE     Spouse name: N/A    Number of children: N/A    Years of education: N/A     Occupational History    Not on file. Social History Main Topics    Smoking status: Not on file    Smokeless tobacco: Not on file    Alcohol use Not on file    Drug use: Not on file    Sexual activity: Not on file     Other Topics Concern    Not on file     Social History Narrative      FAMILY HISTORY: History reviewed. No pertinent family history. No family history on file.     REVIEW OF SYSTEMS:   Psychological ROS: positive for - anxiety, behavioral disorder, concentration difficulties, disorientation, hostility, irritability, memory difficulties and sleep disturbances  Pertinent items are noted in the History of Present Illness. All other Systems reviewed and are considered negative. MENTAL STATUS EXAM & VITALS     MENTAL STATUS EXAM (MSE):    MSE FINDINGS ARE WITHIN NORMAL LIMITS (WNL) UNLESS OTHERWISE STATED BELOW. ( ALL OF THE BELOW CATEGORIES OF THE MSE HAVE BEEN REVIEWED (reviewed 6/16/2018) AND UPDATED AS DEEMED APPROPRIATE )  General Presentation age appropriate, casually dressed and poor hygiene, cooperative, guarded and unreliable   Orientation Alert and Oriented x 1   Vital Signs  See below (reviewed 6/16/2018); Vital Signs (BP, Pulse, & Temp) are within normal limits if not listed below. Gait and Station Stable/steady, no ataxia   Musculoskeletal System No extrapyramidal symptoms (EPS); no abnormal muscular movements or Tardive Dyskinesia (TD); muscle strength and tone are within normal limits   Language No aphasia or dysarthria   Speech:  monotone and soft   Thought Processes illogical; slow rate of thoughts; poor abstract reasoning/computation   Thought Associations loose associations   Thought Content confabulation  and poverty of content   Suicidal Ideations none   Homicidal Ideations none   Mood:  sad   Affect:  mood-incongruent   Memory recent  impaired   Memory remote:  impaired   Concentration/Attention:  distractable   Fund of Knowledge below average   Insight:  poor   Reliability poor   Judgment:  poor          VITALS:     Patient Vitals for the past 24 hrs:   Pulse Resp BP SpO2   06/16/18 2000 66 16 (!) 85/46 99 %     Wt Readings from Last 3 Encounters:   No data found for Wt     Temp Readings from Last 3 Encounters:   06/15/18 97.6 °F (36.4 °C)     BP Readings from Last 3 Encounters:   06/16/18 (!) 85/46     Pulse Readings from Last 3 Encounters:   06/16/18 66            DATA     LABORATORY DATA:  Labs Reviewed - No data to display  No results found for any previous visit. RADIOLOGY REPORTS:  No results found for this or any previous visit. No results found. MEDICATIONS       ALL MEDICATIONS  Current Facility-Administered Medications   Medication Dose Route Frequency    [START ON 6/17/2018] QUEtiapine (SEROquel) tablet 12.5 mg  12.5 mg Oral BID    OLANZapine (ZyPREXA) tablet 2.5 mg  2.5 mg Oral Q6H PRN    ziprasidone (GEODON) 10 mg in sterile water (preservative free) 0.5 mL injection  10 mg IntraMUSCular BID PRN    benztropine (COGENTIN) tablet 1 mg  1 mg Oral BID PRN    benztropine (COGENTIN) injection 1 mg  1 mg IntraMUSCular BID PRN    zolpidem (AMBIEN) tablet 5 mg  5 mg Oral QHS PRN    acetaminophen (TYLENOL) tablet 650 mg  650 mg Oral Q4H PRN    magnesium hydroxide (MILK OF MAGNESIA) 400 mg/5 mL oral suspension 30 mL  30 mL Oral DAILY PRN    nicotine (NICODERM CQ) 21 mg/24 hr patch 1 Patch  1 Patch TransDERmal DAILY PRN    aspirin chewable tablet 81 mg  81 mg Oral DAILY    carvedilol (COREG) tablet 12.5 mg  12.5 mg Oral BID WITH MEALS    dilTIAZem CD (CARDIZEM CD) capsule 120 mg  120 mg Oral DAILY    furosemide (LASIX) tablet 20 mg  20 mg Oral DAILY    loratadine (CLARITIN) tablet 10 mg  10 mg Oral DAILY    famotidine (PEPCID) tablet 20 mg  20 mg Oral DAILY      SCHEDULED MEDICATIONS  Current Facility-Administered Medications   Medication Dose Route Frequency    [START ON 6/17/2018] QUEtiapine (SEROquel) tablet 12.5 mg  12.5 mg Oral BID    aspirin chewable tablet 81 mg  81 mg Oral DAILY    carvedilol (COREG) tablet 12.5 mg  12.5 mg Oral BID WITH MEALS    dilTIAZem CD (CARDIZEM CD) capsule 120 mg  120 mg Oral DAILY    furosemide (LASIX) tablet 20 mg  20 mg Oral DAILY    loratadine (CLARITIN) tablet 10 mg  10 mg Oral DAILY    famotidine (PEPCID) tablet 20 mg  20 mg Oral DAILY                ASSESSMENT & PLAN        The patient, Eric Paredes, is a 68 y.o.  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 Find appropriate placement in a secured facility          I will continue to monitor blood levels (Depakote, Tegretol, lithium, clozapine---a drug with a narrow therapeutic index= NTI) and associated labs for drug therapy implemented that require intense monitoring for toxicity as deemed appropriate based on current medication side effects and pharmacodynamically determined drug 1/2 lives. A coordinated, multidisplinary treatment team (includes the nurse, unit pharmcist,  and writer) round was conducted for this initial evaluation with the patient present. The following regarding medications was addressed during rounds with patient:   the risks and benefits of the proposed medication. The patient was given the opportunity to ask questions. Informed consent given to the use of the above medications. I will continue to adjust psychiatric and non-psychiatric medications (see above \"medication\" section and orders section for details) as deemed appropriate & based upon diagnoses and response to treatment. I have reviewed admission (and previous/old) labs and medical tests in the EHR and or transferring hospital documents. I will continue to order blood tests/labs and diagnostic tests as deemed appropriate and review results as they become available (see orders for details). I have reviewed old psychiatric and medical records available in the EHR. I Will order additional psychiatric records from other institutions to further elucidate the nature of patient's psychopathology and review once available. I will gather additional collateral information from friends, family and o/p treatment team to further elucidate the nature of patient's psychopathology and baselline level of psychiatric functioning.       ESTIMATED LENGTH OF STAY:           STRENGTHS:  Access to housing/residential stability SIGNED:    Neno Love MD  6/16/2018

## 2018-06-17 NOTE — BH NOTES
Pt alert, irritable, sitting on bed in room. Blood pressure low in both arms. LA: 85/46, RA: 88/60. Pt provided fluids and encouraged to ambulate to the day room for snacks. Pt blood pressure will be reassessed in 1 hour. 2130: pt B/P: 87/46. HR: 77. Pt consumed 1/3 of a roast beef sandwich and 2 cups of ginger ale. On call will be notified. On call psych( [address])  recommended Dr. Rudi Lopez be paged and consider IV fluids. Nursing supervisor Gisel Rodriguez notified. Hospitalist consulted through [facility] text    [phone]: Hospitalist (Francie Thomas ) ordered 500ml NS over 1 hour. Nursing Supervisor assisting Marisol Cons with obtaining IV pole and starting IV.     2044: IV fluid started. Pt accepting of intervention. B/P will be monitored q15     2331: IV infusion complete. Pt B/P trending down. Hospitalist would like pt transferred to medical unit. Nursing supervisor contacted for follow up.

## 2018-06-17 NOTE — H&P
History and Physical    Subjective:     Victoriano Nix is a 68 y.o. with past medical hx significant for dementia with behavioral disturbance,  AF, CHF, CAD, environmnetal allergies, fibromyalgia, RA, MI    Pt is re hospitalized on the psychiatric lu after stabilization of her BP on medical lu. Pt was determined to be overmedicated and reduced with her Coreg to improve her low BP. Pt had been initially hospitalized at [facility] behavioral [facility] for dementia with behavior disturbance. She is not a very capable historian. Not aware of her surroundings and disoriented. She does not appear to be in any acute distress. She appears compensated with her chronic medical issues. PMHx: HTN, Dementia, AF,  CHF, CAD, fibromyalgia, RA, MI. PSHx: CABG    FHx: not available    Social History   Substance Use Topics    Smoking status: Remote hx    Smokeless tobacco: None    Alcohol use None     >Adult home in [place]    Prior to Admission medications    Medication Sig Start Date End Date Taking? Authorizing Provider   multivit-minerals/folic acid (WOMEN'S MULTIVITAMIN GUMMIES PO) Take 1 Tab by mouth daily. Historical Provider   QUEtiapine (SEROQUEL) 25 mg tablet Take 12.5 mg by mouth daily. Historical Provider   carvedilol (COREG) 3.125 mg tablet Take 3.125 mg by mouth two (2) times daily (with meals). Historical Provider   diclofenac (VOLTAREN) 1 % gel Apply 2 g to affected area every six (6) hours as needed (arthritis pain). Historical Provider   acetaminophen (TYLENOL) 325 mg tablet Take 650 mg by mouth every eight (8) hours as needed for Pain. Historical Provider   Menthol (ASPERCREME HEAT) 10 % gel by Apply Externally route every eight (8) hours as needed (arthritis). Historical Provider   aspirin 81 mg chewable tablet Take 81 mg by mouth daily. Indications: myocardial infarction prevention    Historical Provider   dilTIAZem XR (DILACOR XR) 120 mg XR capsule Take 120 mg by mouth daily.  Indications: hypertension, VENTRICULAR RATE CONTROL IN ATRIAL FIBRILLATION    Historical Provider   furosemide (LASIX) 20 mg tablet Take 20 mg by mouth daily as needed. Indications: Edema, hypertension    Historical Provider   loratadine (CLARITIN) 10 mg tablet Take 10 mg by mouth daily as needed. Indications: Allergic Rhinitis, SNEEZING    Historical Provider   famotidine (PEPCID) 20 mg tablet Take 20 mg by mouth daily. Indications: Heartburn    Historical Provider   QUEtiapine (SEROQUEL) 25 mg tablet Take 25 mg by mouth nightly. Indications: Generalized Anxiety Disorder    Historical Provider     Allergies   Allergen Reactions    Cephalosporins Itching    Codeine Hives    Erythromycin Hives    Nubain [Nalbuphine] Hives    Pcn [Penicillins] Hives    Percodan [Oxycodone Hcl-Oxycodone-Asa] Hives    Promethazine Hives    Protonix [Pantoprazole] Diarrhea    Sulfa (Sulfonamide Antibiotics) Hives    Toradol [Ketorolac] Hives    Ultram [Tramadol] Hives    Valium [Diazepam] Hives    Prilosec [Omeprazole] Itching        Review of Systems:  ROS may be unreliable due to dementia. Constitutional: negative  Eyes: negative  Ears, Nose, Mouth, Throat, and Face: negative  Respiratory: negative  Cardiovascular: negative  Gastrointestinal: negative  Genitourinary:negative  Integument/Breast: negative  Hematologic/Lymphatic: negative  Musculoskeletal:negative  Neurological: negative  Behavioral/Psychiatric: Dementia with behavioral disturbance  Endocrine: negative  Allergic/Immunologic: negative     Objective: Intake and Output:            Physical Exam:   Visit Vitals    /55    Pulse 62    Temp 97.8 °F (36.6 °C)    Resp 18    Breastfeeding No     General:  Alert, cooperative, no distress, appears stated age. Head:  Normocephalic, without obvious abnormality, atraumatic. Eyes:  Conjunctivae/corneas clear. PERRL, EOMs intact. Ears:  Normal external ear canals both ears. Nose: Nares normal. Septum midline. Mucosa normal. No drainage or sinus tenderness. Throat: Lips, mucosa, and tongue normal. Teeth and gums normal.   Neck: Supple, symmetrical, trachea midline, no adenopathy, thyroid: no enlargement/tenderness/nodules, no JVD   Back:   Symmetric, no curvature. ROM normal. No CVA tenderness. Lungs:   Clear to auscultation bilaterally. Chest wall:  No tenderness or deformity. Heart:  Regular rate and rhythm, S1, S2 normal, no murmur, click, rub or gallop. Abdomen:   Soft, non-tender. Bowel sounds normal. No masses,  No organomegaly. Extremities: No edema. Pulses: 2+ and symmetric all extremities. Skin: Irregular texture due to sun demage   Lymph nodes: Cervical, supraclavicular, and axillary nodes normal.   Neurologic: CNII-XII intact. Normal strength, sensation intact, reflexes present distally. Data Review:   Recent Results (from the past 24 hour(s))   TSH 3RD GENERATION    Collection Time: 06/17/18  1:29 AM   Result Value Ref Range    TSH 3.44 0.36 - 3.74 uIU/mL   CBC WITH AUTOMATED DIFF    Collection Time: 06/17/18  1:29 AM   Result Value Ref Range    WBC 6.7 3.6 - 11.0 K/uL    RBC 4.25 3.80 - 5.20 M/uL    HGB 12.6 11.5 - 16.0 g/dL    HCT 38.6 35.0 - 47.0 %    MCV 90.8 80.0 - 99.0 FL    MCH 29.6 26.0 - 34.0 PG    MCHC 32.6 30.0 - 36.5 g/dL    RDW 14.4 11.5 - 14.5 %    PLATELET 698 806 - 758 K/uL    MPV 10.9 8.9 - 12.9 FL    NRBC 0.0 0  WBC    ABSOLUTE NRBC 0.00 0.00 - 0.01 K/uL    NEUTROPHILS 39 32 - 75 %    LYMPHOCYTES 46 12 - 49 %    MONOCYTES 9 5 - 13 %    EOSINOPHILS 5 0 - 7 %    BASOPHILS 1 0 - 1 %    IMMATURE GRANULOCYTES 0 0.0 - 0.5 %    ABS. NEUTROPHILS 2.6 1.8 - 8.0 K/UL    ABS. LYMPHOCYTES 3.1 0.8 - 3.5 K/UL    ABS. MONOCYTES 0.6 0.0 - 1.0 K/UL    ABS. EOSINOPHILS 0.3 0.0 - 0.4 K/UL    ABS. BASOPHILS 0.0 0.0 - 0.1 K/UL    ABS. IMM.  GRANS. 0.0 0.00 - 0.04 K/UL    DF AUTOMATED     METABOLIC PANEL, COMPREHENSIVE    Collection Time: 06/17/18  1:29 AM   Result Value Ref Range Sodium 138 136 - 145 mmol/L    Potassium 3.6 3.5 - 5.1 mmol/L    Chloride 104 97 - 108 mmol/L    CO2 25 21 - 32 mmol/L    Anion gap 9 5 - 15 mmol/L    Glucose 128 (H) 65 - 100 mg/dL    BUN 23 (H) 6 - 20 MG/DL    Creatinine 0.96 0.55 - 1.02 MG/DL    BUN/Creatinine ratio 24 (H) 12 - 20      GFR est AA >60 >60 ml/min/1.73m2    GFR est non-AA 56 (L) >60 ml/min/1.73m2    Calcium 8.4 (L) 8.5 - 10.1 MG/DL    Bilirubin, total 0.3 0.2 - 1.0 MG/DL    ALT (SGPT) 11 (L) 12 - 78 U/L    AST (SGOT) 17 15 - 37 U/L    Alk. phosphatase 102 45 - 117 U/L    Protein, total 6.6 6.4 - 8.2 g/dL    Albumin 2.7 (L) 3.5 - 5.0 g/dL    Globulin 3.9 2.0 - 4.0 g/dL    A-G Ratio 0.7 (L) 1.1 - 2.2     TROPONIN I    Collection Time: 06/17/18  1:29 AM   Result Value Ref Range    Troponin-I, Qt. <0.05 <0.05 ng/mL       Assessment:     Dementia with behavioral disturbance    Hypotension due to overmedication with antihypertensives    Active Problems:    * No active hospital problems. *    CAD/hx MI    A-fib    RA    Fibromyalgia    Enviromental allergies    CHF    Plan:     Place parameters on BP meds    Restart BB/Calcium channel blockers/diuretic    Restart aspirin    Tylenol for pain    Seroquel as needed. No VTE prophylaxis indicated or necessary at this time.    Signed By: Geni Guardado MD     June 17, 2018

## 2018-06-17 NOTE — PROGRESS NOTES
6/16/18    TRANSFER - IN REPORT:    1829 Verbal report received from Marilu Huerta [facility] Daniel (name) on Jamari Cutler  being received from [facility] (unit) for change in patient condition(hypotension)      Report consisted of patients Situation, Background, Assessment and   Recommendations(SBAR). Information from the following report(s) SBAR and Accordion was reviewed with the receiving nurse. Opportunity for questions and clarification was provided. Assessment completed upon patients arrival to unit and care assumed.        [address]

## 2018-06-17 NOTE — IP AVS SNAPSHOT
[facility] 
 
 
 [address] Patient: Eh Sánchez 
MRN: [id number] SVA:1/[id number]/[id number] A check hang indicates which time of day the medication should be taken. My Medications START taking these medications Instructions Each Dose to Equal  
 Morning Noon Evening Bedtime  
 memantine 10 mg tablet Commonly known as:  Acey Pupa Your last dose was: Your next dose is: Take 1 Tab by mouth two (2) times a day for 14 days. Indications: MODERATE TO SEVERE ALZHEIMER'S TYPE DEMENTIA 10 mg  
    
   
   
   
  
 nystatin powder Commonly known as:  MYCOSTATIN Your last dose was: Your next dose is:    
   
   
 Apply  to affected area two (2) times a day for 14 days. Apply to affected area bid  Indications: Diaper Rash  
     
   
   
   
  
 risperiDONE 1 mg/mL oral solution Commonly known as:  RisperDAL Your last dose was: Your next dose is: Take 1 mL by mouth nightly for 14 days. Indications: behavior sx with dementia  
 1 mg CHANGE how you take these medications Instructions Each Dose to Equal  
 Morning Noon Evening Bedtime * aspirin 81 mg chewable tablet What changed:  Another medication with the same name was added. Make sure you understand how and when to take each. Your last dose was: Your next dose is: Take 81 mg by mouth daily. Indications: myocardial infarction prevention 81 mg  
    
   
   
   
  
 * aspirin 81 mg chewable tablet Start taking on:  7/31/2018 What changed: You were already taking a medication with the same name, and this prescription was added. Make sure you understand how and when to take each. Your last dose was: Your next dose is: Take 1 Tab by mouth daily for 14 days. Indications: myocardial infarction prevention  81 mg  
    
   
   
 * carvedilol 3.125 mg tablet Commonly known as:  Monda Fazal What changed:  Another medication with the same name was added. Make sure you understand how and when to take each. Your last dose was: Your next dose is: Take 3.125 mg by mouth two (2) times daily (with meals). 3.125 mg  
    
   
   
   
  
 * carvedilol 3.125 mg tablet Commonly known as:  Monda Fazal What changed: You were already taking a medication with the same name, and this prescription was added. Make sure you understand how and when to take each. Your last dose was: Your next dose is: Take 1 Tab by mouth two (2) times daily (with meals) for 14 days. Indications: PREVENTION OF A. FIB POST CARDIO-THORACIC SURGERY  
 3.125 mg  
    
   
   
   
  
 * dilTIAZem  mg XR capsule Commonly known as:  DILACOR XR What changed:  Another medication with the same name was added. Make sure you understand how and when to take each. Your last dose was: Your next dose is: Take 120 mg by mouth daily. Indications: hypertension, VENTRICULAR RATE CONTROL IN ATRIAL FIBRILLATION  
 120 mg  
    
   
   
   
  
 * dilTIAZem  mg ER capsule Commonly known as:  CARDIZEM CD Start taking on:  7/31/2018 What changed: You were already taking a medication with the same name, and this prescription was added. Make sure you understand how and when to take each. Your last dose was: Your next dose is: Take 1 Cap by mouth daily for 14 days. Indications: hypertension 120 mg  
    
   
   
   
  
 * PEPCID 20 mg tablet Generic drug:  famotidine What changed:  Another medication with the same name was added. Make sure you understand how and when to take each. Your last dose was: Your next dose is: Take 20 mg by mouth daily. Indications: Heartburn  
 20 mg  
    
   
   
   
  
 * famotidine 20 mg tablet Commonly known as:  PEPCID Start taking on:  7/31/2018 What changed: You were already taking a medication with the same name, and this prescription was added. Make sure you understand how and when to take each. Your last dose was: Your next dose is: Take 1 Tab by mouth daily for 14 days. Indications: Heartburn  
 20 mg  
    
   
   
   
  
 * Notice: This list has 8 medication(s) that are the same as other medications prescribed for you. Read the directions carefully, and ask your doctor or other care provider to review them with you. STOP taking these medications ASPERCREME HEAT 10 % Gel Generic drug:  Menthol LASIX 20 mg tablet Generic drug:  furosemide  
   
  
 loratadine 10 mg tablet Commonly known as:  CLARITIN  
   
  
 QUEtiapine 25 mg tablet Commonly known as:  SEROquel SEROquel 25 mg tablet Generic drug:  QUEtiapine VOLTAREN 1 % Gel Generic drug:  diclofenac  
   
  
 WOMEN'S MULTIVITAMIN GUMMIES PO  
   
  
  
ASK your doctor about these medications Instructions Each Dose to Equal  
 Morning Noon Evening Bedtime  
 acetaminophen 325 mg tablet Commonly known as:  TYLENOL Your last dose was: Your next dose is: Take 650 mg by mouth every eight (8) hours as needed for Pain. 650 mg Where to Get Your Medications Information on where to get these meds will be given to you by the nurse or doctor. ! Ask your nurse or doctor about these medications  
  aspirin 81 mg chewable tablet  
 carvedilol 3.125 mg tablet  
 dilTIAZem  mg ER capsule  
 famotidine 20 mg tablet  
 memantine 10 mg tablet  
 nystatin powder  
 risperiDONE 1 mg/mL oral solution

## 2018-06-17 NOTE — PROGRESS NOTES
Patient was transferred yesterday to Medical due to Hypotension per Nursing consult with Dr. Varghese Chawla. Dr. Jesus Miranda informed today that her antihypertensives have been adjusted and her BP is stable now and she will be transferred back to Psychiatry. Only Psych medications that she is on at present is Seroquel 12.5 mg bid. Will hold (D/C) Seroquel for now until her BP is monitored to be stable or if an emergent Psych need arises.

## 2018-06-17 NOTE — PROGRESS NOTES
TRANSFER - OUT REPORT:    Verbal report given to Alex(name) on Arnaldo Gongora  being transferred to Behavioral Health(unit) for routine progression of care       Report consisted of patients Situation, Background, Assessment and   Recommendations(SBAR). Information from the following report(s) SBAR, Kardex, MAR, Accordion and Recent Results was reviewed with the receiving nurse    Opportunity for questions and clarification was provided.       Patient transported with:   Tech; security

## 2018-06-17 NOTE — BH NOTES
Patient pulled out IV access. Patient is uncooperative with staff at this time. MD order for bolus 250 mls. Patient is stating she does not want. MD notified. Will continue to monitor patient and assess needs.

## 2018-06-17 NOTE — IP AVS SNAPSHOT
Summary of Care Report The Summary of Care report has been created to help improve care coordination. Users with access to [employer] or [employer] [address] (Web-based application) may access additional patient information including the Discharge Summary. If you are not currently a [address] user and need more information, please call the number listed below in the [place] [address] section and ask to be connected with Medical Records. Facility Information Name Address Phone [facility] [address] [phone] Patient Information Patient Name Sex  Barbi Grant ([id number]) Female 1940 Discharge Information Admitting Provider Service Area Unit Darhsana Lazcano MD / [address] / [phone] Discharge Provider Discharge Date/Time Discharge Disposition Destination Darshana Lazcano MD / [phone] 18 1011 [facility] (none) Patient Language Language ENGLISH [13] Hospital Problems as of 2018  Never Reviewed Class Noted - Resolved Last Modified POA Active Problems * (Principal)Dementia  2018 - Present 2018 by Darshana Lazcano MD Unknown Entered by Fatemeh Mccormick MD  
  
Non-Hospital Problems as of 2018  Never Reviewed Class Noted - Resolved Last Modified Active Problems Dementia due to Alzheimer's disease  6/15/2018 - Present 6/15/2018 by Ana Miranda MD  
  Entered by Ana Miranda MD  
  Alteration of behavior in patient older than 59years of age  6/15/2018 - Present 2018 by nAa Miranda MD  
  Entered by Ana Miranda MD  
  Hypotension  2018 - Present 2018 by Fatemeh Mccormick MD  
  Entered by Fatemeh Mccormick MD  
  
You are allergic to the following Allergen Reactions Cephalosporins Itching Codeine Hives Erythromycin Hives Nubain (Nalbuphine) Hives Pcn (Penicillins) Hives Percodan (Oxycodone Hcl-Oxycodone-Asa) Hives Promethazine Hives Protonix (Pantoprazole) Diarrhea Sulfa (Sulfonamide Antibiotics) Hives Toradol (Ketorolac) Hives Ultram (Tramadol) Hives Valium (Diazepam) Hives Prilosec (Omeprazole) Itching Current Discharge Medication List  
  
START taking these medications Dose & Instructions Dispensing Information Comments  
 memantine 10 mg tablet Commonly known as:  Sesay Anchors Dose:  10 mg Take 1 Tab by mouth two (2) times a day for 14 days. Indications: MODERATE TO SEVERE ALZHEIMER'S TYPE DEMENTIA Quantity:  28 Tab Refills:  0  
   
 nystatin powder Commonly known as:  MYCOSTATIN Apply  to affected area two (2) times a day for 14 days. Apply to affected area bid  Indications: Diaper Rash Quantity:  15 g Refills:  0  
   
 risperiDONE 1 mg/mL oral solution Commonly known as:  RisperDAL Dose:  1 mg Take 1 mL by mouth nightly for 14 days. Indications: behavior sx with dementia Quantity:  14 mL Refills:  0 CONTINUE these medications which have CHANGED Dose & Instructions Dispensing Information Comments * aspirin 81 mg chewable tablet What changed:  Another medication with the same name was added. Make sure you understand how and when to take each. Dose:  81 mg Take 81 mg by mouth daily. Indications: myocardial infarction prevention Refills:  0  
   
 * aspirin 81 mg chewable tablet Start taking on:  7/31/2018 What changed: You were already taking a medication with the same name, and this prescription was added. Make sure you understand how and when to take each. Dose:  81 mg Take 1 Tab by mouth daily for 14 days. Indications: myocardial infarction prevention Quantity:  14 Tab Refills:  0 * carvedilol 3.125 mg tablet Commonly known as:  Gillermina Begun What changed:  Another medication with the same name was added. Make sure you understand how and when to take each. Dose:  3.125 mg Take 3.125 mg by mouth two (2) times daily (with meals). Refills:  0  
   
 * carvedilol 3.125 mg tablet Commonly known as:  Gillermina Begun What changed: You were already taking a medication with the same name, and this prescription was added. Make sure you understand how and when to take each. Dose:  3.125 mg Take 1 Tab by mouth two (2) times daily (with meals) for 14 days. Indications: PREVENTION OF A. FIB POST CARDIO-THORACIC SURGERY Quantity:  28 Tab Refills:  0  
   
 * dilTIAZem  mg XR capsule Commonly known as:  DILACOR XR What changed:  Another medication with the same name was added. Make sure you understand how and when to take each. Dose:  120 mg Take 120 mg by mouth daily. Indications: hypertension, VENTRICULAR RATE CONTROL IN ATRIAL FIBRILLATION Refills:  0  
   
 * dilTIAZem  mg ER capsule Commonly known as:  CARDIZEM CD Start taking on:  7/31/2018 What changed: You were already taking a medication with the same name, and this prescription was added. Make sure you understand how and when to take each. Dose:  120 mg Take 1 Cap by mouth daily for 14 days. Indications: hypertension Quantity:  14 Cap Refills:  0  
   
 * PEPCID 20 mg tablet Generic drug:  famotidine What changed:  Another medication with the same name was added. Make sure you understand how and when to take each. Dose:  20 mg Take 20 mg by mouth daily. Indications: Heartburn Refills:  0  
   
 * famotidine 20 mg tablet Commonly known as:  PEPCID Start taking on:  7/31/2018 What changed: You were already taking a medication with the same name, and this prescription was added. Make sure you understand how and when to take each.   
 Dose:  20 mg  
 Take 1 Tab by mouth daily for 14 days. Indications: Heartburn Quantity:  14 Tab Refills:  0  
   
 * Notice: This list has 8 medication(s) that are the same as other medications prescribed for you. Read the directions carefully, and ask your doctor or other care provider to review them with you. STOP taking these medications Comments ASPERCREME HEAT 10 % Gel Generic drug:  Menthol LASIX 20 mg tablet Generic drug:  furosemide  
   
   
 loratadine 10 mg tablet Commonly known as:  CLARITIN  
   
   
 QUEtiapine 25 mg tablet Commonly known as:  SEROquel SEROquel 25 mg tablet Generic drug:  QUEtiapine VOLTAREN 1 % Gel Generic drug:  diclofenac  
   
   
 WOMEN'S MULTIVITAMIN GUMMIES PO  
   
   
  
ASK your doctor about these medications Dose & Instructions Dispensing Information Comments  
 acetaminophen 325 mg tablet Commonly known as:  TYLENOL Dose:  650 mg Take 650 mg by mouth every eight (8) hours as needed for Pain. Refills:  0 Follow-up Information Follow up With Details Comments [facility] Your will be residing here and your medication prescriptions were faxed to their facility where they will fill your prescriptions and continue medication management. Address: [address] [place] [address] Phone: [phone] Discharge Instructions DISCHARGE SUMMARY from Nurse The following personal items are in your possession at time of discharge: 
 
Dental Appliances: None Visual Aid: None Home Medications: None Clothing: None Other Valuables: None Personal Items Sent to Safe: none PATIENT INSTRUCTIONS: 
 
If I feel that I can not keep these promises and I am at risk of hurting myself or others, I will call the crisis office and speak with a crisis worker who will assist me during my crisis. [address] [place] [address]  [phone] [address]   [phone] [facility]  [phone] [facility]  [phone] Lifestyle Tips: 
Appointment after discharge and follow up phone call: After being discharged, if your appointment does not work for you, please feel free to contact the physician's office to change the appointment. Medications: Take your medicines exactly as instructed. Ask your doctor or nurse if you have questions about your medicines. Tell your doctor right away if you have problems with your medicines. These are general instructions for a healthy lifestyle: No smoking/ No tobacco products/ Avoid exposure to second hand smoke Surgeon General's Warning:  Quitting smoking now greatly reduces serious risk to your health. Obesity, smoking, and sedentary lifestyle greatly increases your risk for illness A healthy diet, regular physical exercise & weight monitoring are important for maintaining a healthy lifestyle Recognize signs and symptoms of STROKE: 
 
F-face looks uneven A-arms unable to move or move unevenly S-speech slurred or non-existent T-time-call 911 as soon as signs and symptoms begin-DO NOT go Back to bed or wait to see if you get better-TIME IS BRAIN. Warning Signs of HEART ATTACK Call 911 if you have these symptoms: 
? Chest discomfort. Most heart attacks involve discomfort in the center of the chest that lasts more than a few minutes, or that goes away and comes back. It can feel like uncomfortable pressure, squeezing, fullness, or pain. ? Discomfort in other areas of the upper body. Symptoms can include pain or discomfort in one or both arms, the back, neck, jaw, or stomach. ? Shortness of breath with or without chest discomfort. ? Other signs may include breaking out in a cold sweat, nausea, or lightheadedness. Don't wait more than five minutes to call [address]! Fast action can save your life. Calling 911 is almost always the fastest way to get lifesaving treatment. Emergency Medical Services staff can begin treatment when they arrive  up to an hour sooner than if someone gets to the hospital by car. Myself and/or my family have received education about my diagnosis throughout my hospital stay. During my hospital stay, I and/or my family/caregiver were included in planning my care upon discharge. My needs were taken into consideration and I was included in my discharge planning. I had an opportunity to ask questions. The discharge information has been reviewed with the patient. The patient verbalized understanding. Discharge medications reviewed with the patient and appropriate educational materials and side effects teaching were provided. Chart Review Routing History No Routing History on File

## 2018-06-18 PROCEDURE — 74011250637 HC RX REV CODE- 250/637: Performed by: INTERNAL MEDICINE

## 2018-06-18 PROCEDURE — 65220000003 HC RM SEMIPRIVATE PSYCH

## 2018-06-18 PROCEDURE — 74011250637 HC RX REV CODE- 250/637: Performed by: PSYCHIATRY & NEUROLOGY

## 2018-06-18 RX ORDER — MEMANTINE HYDROCHLORIDE 10 MG/1
5 TABLET ORAL DAILY
Status: DISCONTINUED | OUTPATIENT
Start: 2018-06-19 | End: 2018-06-21

## 2018-06-18 RX ADMIN — CARVEDILOL 3.12 MG: 3.12 TABLET, FILM COATED ORAL at 09:15

## 2018-06-18 RX ADMIN — FAMOTIDINE 20 MG: 20 TABLET ORAL at 09:15

## 2018-06-18 RX ADMIN — DILTIAZEM HYDROCHLORIDE 120 MG: 120 CAPSULE, COATED, EXTENDED RELEASE ORAL at 09:14

## 2018-06-18 RX ADMIN — ASPIRIN 81 MG 81 MG: 81 TABLET ORAL at 09:15

## 2018-06-18 RX ADMIN — ACETAMINOPHEN 650 MG: 325 TABLET, FILM COATED ORAL at 16:21

## 2018-06-18 RX ADMIN — ZOLPIDEM TARTRATE 5 MG: 5 TABLET ORAL at 22:03

## 2018-06-18 NOTE — BH NOTES
GROUP THERAPY PROGRESS NOTE    The patient Jose Alfredo lew 68 y.o. female is participating in Creative Expression Group. Group time: 1 hour    Personal goal for participation: To concentrate on selected task    Goal orientation: social    Group therapy participation: active    Therapeutic interventions reviewed and discussed: Crafts, games, music    Impression of participation: The patient was attentive.     Meño Brantley  2018  5:57 PM

## 2018-06-18 NOTE — BH NOTES
Writer spoke to Ms Polina Guthrie, patient's daughter. She wanted to know how the patient was doing. She stated that the patient was sent here because an aide stated her mother threw her covers across the bed and it looked cristian she was going to lounge at her roommate, which she did not do. The daughter said they wanted a psychological evaluation, so she was sent here. Ms. Polina Guthrie stated that her mom has never been aggressive. She said that she has not spoken to anyone at the nursing home since mom was sent here, and she does not know if she will be accepted back.

## 2018-06-18 NOTE — BH NOTES
Pt observed resting comfortably in bed with eyes closed, breathing even and unlabored. No s/s of distress noted, no complaints voiced. Pt slept 5 hours, will continue Q 15 minute monitoring for safety.

## 2018-06-18 NOTE — BH NOTES
PSYCHOSOCIAL ASSESSMENT  :Patient identifying info:  Anastasia Pillai is a 68 y.o., female admitted 6/17/2018  3:36 PM     Presenting problem and precipitating factors: LTC facility  reports that pt's dementia has progressively become worse. She has begun to wander more frequently seeking their doors that open within 5 seconds. Pt has reportedly become more confused, verbally threatening and lunged at roommate before hospitalization. There was a meeting held with daughters in February 2018 to begin looking at memory care facilities but family has been resistant. LTC would like to move forward with finding memory care facility if the family will be willing to agree. Pt confused and unwilling to sign releases for daughters - stating she is the daughter. LTC facility will be faxing this  the co-guardianship paperwork for pt's daughters - will add to chart upon arrival and obtain collateral information. Active medication orders as of 6/11/2018    Aspercreme 10% topical every 8 hours as needed  Aspirin 81 mg  Carvedilol 3.125 mg 2 x daily  Diltiazem 120 mg ER  Famotidine 20 Mg  Furosemide 20 mg every 24 hours as needed  Loratadine 10 MG  Every 24 hours as needed  Mapap 325 mg every 6 hours as needed  Seroquel 12.5 morning  Seroquel 25 mg bedtime Voltaren gel 1%  Bedtime, as needed     Mental status assessment:  Pt is oriented only to self. Pt states \"I am the daughter, I am the little girl. \"  Pt's mood is labile, irrtiable, affect is labile. Pt's thought process is illogical due to dementia diagnosis. Pt's insight and judgment are impaired. Current psychiatric providers and contact info: Sees PCP in LTC facility. Previous psychiatric services/providers and response to treatment: Unknown. Family history of mental illness : unknown. collateral needed. Substance abuse history:  None.  UDS negative, BAL=0  Social History   Substance Use Topics    Smoking status: Not on file    Smokeless tobacco: Not on file    Alcohol use Not on file       Family constellation: Pt has 3 adult children. Is significant other involved? Describe support system: Pt's daughters are her co-guardians - this paperwork has not yet been received by [facility] and they have not contacted this . Describe living arrangements and home environment:  Pt has been living in a Long term care facility. This facility would like to find memory care placement. Health issues:   Hospital Problems  Never Reviewed          Codes Class Noted POA    * (Principal)Dementia ICD-10-CM: F03.90  ICD-9-CM: 294.20  2018 Unknown              Trauma history: Unknown. Legal issues: None. History of  service: Unknown. Financial status: Unknown     Muslim/cultural factors:  Unknown. Education/work history: 11th - collateral needed. Have you been licensed as a tereso care professional (current or ): No.  Leisure and recreation preferences: Pt enjoys coloring, dolls, stuffed animals and stationary. Describe coping skills:  Limited.      Jessica Trejo  2018

## 2018-06-18 NOTE — WOUND CARE
Wound care consult from floor for sacral skin break down. Chart reviewed and assessed the patient. No skin break down noted patient awake alert oriented walking around.

## 2018-06-18 NOTE — BH NOTES
Patient was received this morning in bed with eyes closed. Patient was breathing evenly. Patient did go to breakfast this morning and fed herself. Patient ate about 75% of meal.Patient had a prince bear in arms along with another stuffed animal. Patient became a little agitated when writer took a container of markers from her, explaining they needed to stay in the dayroom. Patient declared repeatedly that they belonged her to her. She stated that I took her clothes and placed them in there (the nurse's station.) Patient's voice raised a little, but writer was able to keep patient calm and she went to the dayroom when told she had to keep the markers there. Patient remained in the dayroom during group.

## 2018-06-18 NOTE — PROGRESS NOTES
Bedside and Verbal shift change report given to Ada Allen (oncoming nurse) by Omar Farrell (offgoing nurse). Report included the following information SBAR, Kardex, MAR, Accordion, Recent Results and Cardiac Rhythm a-fib. Patient had a total of 1000cc of NS in the early morning. Alert, oriented to self (dementia). Denies pain. 0800- /63  Held scheduled Seroquel. Dr. Andrew Fuchs notified. Will administer scheduled ASA and Pepcid. 1254- /47. Dr. Andrew Fuchs aware; patient will be transported back to the behavioral health unit.

## 2018-06-18 NOTE — BH NOTES
GROUP THERAPY PROGRESS NOTE    Keeley Vee is participating in [place].      Group time: 30 minutes    Personal goal for participation: reality orientation    Goal orientation: social    Group therapy participation: passive    Therapeutic interventions reviewed and discussed: yes    Impression of participation: no problem

## 2018-06-18 NOTE — BH NOTES
GROUP THERAPY PROGRESS NOTE    The patient Aime Siddiqui a 68 y.o. female is participating in Coping Skills Group. Group time: 45 minutes    Personal goal for participation:  To participate in self esteem booster    Goal orientation:  personal    Group therapy participation: active    Therapeutic interventions reviewed and discussed: self nurturing activities    Impression of participation:  The patient was attentive-required prompting    Alexandre Hernandez  6/18/2018  1:37 PM

## 2018-06-19 PROCEDURE — 74011250637 HC RX REV CODE- 250/637: Performed by: INTERNAL MEDICINE

## 2018-06-19 PROCEDURE — 65220000003 HC RM SEMIPRIVATE PSYCH

## 2018-06-19 RX ADMIN — CARVEDILOL 3.12 MG: 3.12 TABLET, FILM COATED ORAL at 18:04

## 2018-06-19 NOTE — PROGRESS NOTES
Problem: Dementia-BEHAVIORAL HEALTH (Adult/Pediatric)  Goal: *STG: Remains safe in hospital  Outcome: Progressing Towards Goal  Encouraged pt to come to the dayroom for dinner, pt ate 75% of her meal.  Pt denies any needs. Will continue to monitor pt q15 minutes for safety and support.

## 2018-06-19 NOTE — BH NOTES
Pt. Sat quietly in day room, most of evening. HS took Ambien 5 mg for sleep. Presently in room, began dry heaves/ vomiting, only mucus. Vs. 140/64, P. 66, RR 16, temp- Deferred. No apparent cause. Stated she felt \"sick all evening\", when asked, she appeared very comfortable all evening. MHT noticed that she became upset about finding a BM in toilet, apparently hers.

## 2018-06-19 NOTE — BH NOTES
The patient encouraged to come to lunch and she finally came to eat. She let me do her vitals which were 97.1-62-20 and b/p 123/57. She ate 75 percent of the lunch meal.

## 2018-06-19 NOTE — BH NOTES
Pt observed resting comfortably in bed with eyes closed, breathing even and unlabored. No s/s of distress noted, no complaints voiced. Pt slept 4 hours, will continue Q 15 minute monitoring for safety.

## 2018-06-19 NOTE — BH NOTES
Patient refused to get up and refused her medications this morning. Patient was notified. Patient remained in bed until lunch. Patient remains confused. Patient continues to carry her prince bear around with her.

## 2018-06-19 NOTE — H&P
INITIAL PSYCHIATRIC EVALUATION            IDENTIFICATION:    Patient Name  Karri Garcia   Date of Birth 1940   [facility] [id number]   Medical Record Number  [id number]      Age  68 y.o. PCP Berenice Spears, MD   Admit date:  6/17/2018    Room Number  321/01  @ [facility]   Date of Service  6/18/2018            HISTORY         REASON FOR HOSPITALIZATION:  CC: \"I am in Ef\". Pt admitted under a temporary long term order (TDO) for severe dementia and agitation proving to be an imminent danger to self and others and an inability to care for self. HISTORY OF PRESENT ILLNESS:    The patient, Karri Garcia, is a 68 y.o. WHITE [place]  female with a past psychiatric history significant for dementia, who presents at this time with complaints of (and/or evidence of) the following emotional symptoms: memory impairment. Additional symptomatology include disoriented to time and place, poor memory and behavior issues. pt has been incontent and need assistance with ADLs. She is irritable and easily gets loud and labile in mood. She was dc to medical unit for tx of hypotension and now back on U for further tx  The above symptoms have been present for years. These symptoms are of severe severity. These symptoms are constant  in nature. ALLERGIES:   Allergies   Allergen Reactions    Cephalosporins Itching    Codeine Hives    Erythromycin Hives    Nubain [Nalbuphine] Hives    Pcn [Penicillins] Hives    Percodan [Oxycodone Hcl-Oxycodone-Asa] Hives    Promethazine Hives    Protonix [Pantoprazole] Diarrhea    Sulfa (Sulfonamide Antibiotics) Hives    Toradol [Ketorolac] Hives    Ultram [Tramadol] Hives    Valium [Diazepam] Hives    Prilosec [Omeprazole] Itching      MEDICATIONS PRIOR TO ADMISSION:   Prescriptions Prior to Admission   Medication Sig    multivit-minerals/folic acid (WOMEN'S MULTIVITAMIN GUMMIES PO) Take 1 Tab by mouth daily.     QUEtiapine (SEROQUEL) 25 mg tablet Take 12.5 mg by mouth daily.  carvedilol (COREG) 3.125 mg tablet Take 3.125 mg by mouth two (2) times daily (with meals).  diclofenac (VOLTAREN) 1 % gel Apply 2 g to affected area every six (6) hours as needed (arthritis pain).  acetaminophen (TYLENOL) 325 mg tablet Take 650 mg by mouth every eight (8) hours as needed for Pain.  Menthol (ASPERCREME HEAT) 10 % gel by Apply Externally route every eight (8) hours as needed (arthritis).  aspirin 81 mg chewable tablet Take 81 mg by mouth daily. Indications: myocardial infarction prevention    dilTIAZem XR (DILACOR XR) 120 mg XR capsule Take 120 mg by mouth daily. Indications: hypertension, VENTRICULAR RATE CONTROL IN ATRIAL FIBRILLATION    furosemide (LASIX) 20 mg tablet Take 20 mg by mouth daily as needed. Indications: Edema, hypertension    loratadine (CLARITIN) 10 mg tablet Take 10 mg by mouth daily as needed. Indications: Allergic Rhinitis, SNEEZING    famotidine (PEPCID) 20 mg tablet Take 20 mg by mouth daily. Indications: Heartburn    QUEtiapine (SEROQUEL) 25 mg tablet Take 25 mg by mouth nightly. Indications: Generalized Anxiety Disorder      PAST MEDICAL HISTORY:   No past medical history on file. No past surgical history on file. SOCIAL HISTORY:    Social History     Social History    Marital status: SINGLE     Spouse name: N/A    Number of children: N/A    Years of education: N/A     Occupational History    Not on file. Social History Main Topics    Smoking status: Not on file    Smokeless tobacco: Not on file    Alcohol use Not on file    Drug use: Not on file    Sexual activity: Not on file     Other Topics Concern    Not on file     Social History Narrative      FAMILY HISTORY: History reviewed. No pertinent family history. No family history on file.     REVIEW OF SYSTEMS:   Psychological ROS: positive for - behavioral disorder, concentration difficulties, disorientation, irritability, mood swings and memory impairment  Pertinent items are noted in the History of Present Illness. All other Systems reviewed and are considered negative. MENTAL STATUS EXAM & VITALS     MENTAL STATUS EXAM (MSE):    MSE FINDINGS ARE WITHIN NORMAL LIMITS (WNL) UNLESS OTHERWISE STATED BELOW. ( ALL OF THE BELOW CATEGORIES OF THE MSE HAVE BEEN REVIEWED (reviewed 6/18/2018) AND UPDATED AS DEEMED APPROPRIATE )  General Presentation age appropriate, disheveled and malodorous, uncooperative and unreliable   Orientation disorganized, disoriented, Alert and Oriented x 1   Vital Signs  See below (reviewed 6/18/2018); Vital Signs (BP, Pulse, & Temp) are within normal limits if not listed below.    Gait and Station Stable/steady, no ataxia   Musculoskeletal System No extrapyramidal symptoms (EPS); no abnormal muscular movements or Tardive Dyskinesia (TD); muscle strength and tone are within normal limits   Language No aphasia or dysarthria   Speech:  hyperverbal and loud   Thought Processes illogical; normal rate of thoughts; poor abstract reasoning/computation   Thought Associations loose associations   Thought Content free of delusions, free of hallucinations and internally preoccupied   Suicidal Ideations none   Homicidal Ideations none   Mood:  anxious  and irritable   Affect:  anxious and irritable   Memory recent  impaired   Memory remote:  impaired   Concentration/Attention:  distractable   Fund of Knowledge below average   Insight:  poor   Reliability poor   Judgment:  poor          VITALS:     Patient Vitals for the past 24 hrs:   Temp Pulse Resp BP SpO2   06/18/18 1743 97.1 °F (36.2 °C) (!) 51 16 - 100 %   06/18/18 1700 - (!) 51 - 128/52 -   06/18/18 1257 97.7 °F (36.5 °C) 61 16 132/58 100 %   06/18/18 0914 - 77 - 126/67 -   06/18/18 0652 98.2 °F (36.8 °C) 76 16 123/65 -   06/17/18 2126 - 76 16 (!) 111/96 -     Wt Readings from Last 3 Encounters:   06/18/18 54.6 kg (120 lb 4.8 oz)     Temp Readings from Last 3 Encounters:   06/18/18 97.1 °F (36.2 °C) 06/17/18 97.6 °F (36.4 °C)   06/15/18 97.6 °F (36.4 °C)     BP Readings from Last 3 Encounters:   06/18/18 128/52   06/17/18 102/47   06/16/18 99/55     Pulse Readings from Last 3 Encounters:   06/18/18 (!) 51   06/17/18 70   06/16/18 81            DATA     LABORATORY DATA:  Labs Reviewed - No data to display  Admission on 06/17/2018, Discharged on 06/17/2018   Component Date Value Ref Range Status    Ventricular Rate 06/17/2018 69  BPM Final    Atrial Rate 06/17/2018 63  BPM Final    QRS Duration 06/17/2018 82  ms Final    Q-T Interval 06/17/2018 424  ms Final    QTC Calculation (Bezet) 06/17/2018 454  ms Final    Calculated R Axis 06/17/2018 7  degrees Final    Calculated T Axis 06/17/2018 74  degrees Final    Diagnosis 06/17/2018    Final                    Value:Atrial fibrillation with a controlled ventricular response  Nonspecific ST and T wave abnormality , probably digitalis effect  No previous ECGs available  Confirmed by Andi Perches ([id number]) on 6/17/2018 11:14:17 PM          RADIOLOGY REPORTS:    Results from Hospital Encounter encounter on 06/17/18   XR CHEST PORT   Narrative EXAM:  XR CHEST PORT    INDICATION:  Chest Pain    COMPARISON:  None. FINDINGS: A portable AP radiograph of the chest was obtained at 720 hours. The  patient is on a cardiac monitor. There is minor right basilar atelectasis. There is question of a vague right perihilar airspace process. There is slight  diffuse interstitial prominence. Heart size is borderline enlarged. Patient  status post median sternotomy. Impression IMPRESSION:   1. There is suggestion of a vague right perihilar airspace process and follow-up  studies would be helpful to assess for interval change. There is minor right  basilar atelectasis. Xr Chest Port    Result Date: 6/17/2018  EXAM:  XR CHEST PORT INDICATION:  Chest Pain COMPARISON:  None. FINDINGS: A portable AP radiograph of the chest was obtained at 720 hours.  The patient is on a cardiac monitor. There is minor right basilar atelectasis. There is question of a vague right perihilar airspace process. There is slight diffuse interstitial prominence. Heart size is borderline enlarged. Patient status post median sternotomy. IMPRESSION: 1. There is suggestion of a vague right perihilar airspace process and follow-up studies would be helpful to assess for interval change. There is minor right basilar atelectasis.               MEDICATIONS       ALL MEDICATIONS  Current Facility-Administered Medications   Medication Dose Route Frequency    OLANZapine (ZyPREXA) tablet 2.5 mg  2.5 mg Oral Q6H PRN    ziprasidone (GEODON) 10 mg in sterile water (preservative free) 0.5 mL injection  10 mg IntraMUSCular BID PRN    benztropine (COGENTIN) tablet 1 mg  1 mg Oral BID PRN    benztropine (COGENTIN) injection 1 mg  1 mg IntraMUSCular BID PRN    zolpidem (AMBIEN) tablet 5 mg  5 mg Oral QHS PRN    acetaminophen (TYLENOL) tablet 650 mg  650 mg Oral Q4H PRN    magnesium hydroxide (MILK OF MAGNESIA) 400 mg/5 mL oral suspension 30 mL  30 mL Oral DAILY PRN    nicotine (NICODERM CQ) 21 mg/24 hr patch 1 Patch  1 Patch TransDERmal DAILY PRN    aspirin chewable tablet 81 mg  81 mg Oral DAILY    famotidine (PEPCID) tablet 20 mg  20 mg Oral DAILY    carvedilol (COREG) tablet 3.125 mg  3.125 mg Oral BID WITH MEALS    dilTIAZem CD (CARDIZEM CD) capsule 120 mg  120 mg Oral DAILY      SCHEDULED MEDICATIONS  Current Facility-Administered Medications   Medication Dose Route Frequency    aspirin chewable tablet 81 mg  81 mg Oral DAILY    famotidine (PEPCID) tablet 20 mg  20 mg Oral DAILY    carvedilol (COREG) tablet 3.125 mg  3.125 mg Oral BID WITH MEALS    dilTIAZem CD (CARDIZEM CD) capsule 120 mg  120 mg Oral DAILY                ASSESSMENT & PLAN        The patient, Niesha Talley, is a 68 y.o.  female who presents at this time for treatment of the following diagnoses:  Patient Active Hospital Problem List:   Dementia (6/17/2018)    Assessment: moderate to severe- disoriented, poor memory and irritable. Need total care    Plan: consider Namenda. Hypotension  A: recent dc from medical unit  P: ct to monitor by IM- cautious with AP- dc Seroquel          I will continue to monitor blood levels (Depakote, Tegretol, lithium, clozapine---a drug with a narrow therapeutic index= NTI) and associated labs for drug therapy implemented that require intense monitoring for toxicity as deemed appropriate based on current medication side effects and pharmacodynamically determined drug 1/2 lives. A coordinated, multidisplinary treatment team (includes the nurse, unit pharmcist,  and writer) round was conducted for this initial evaluation with the patient present. The following regarding medications was addressed during rounds with patient:   the risks and benefits of the proposed medication. The patient was given the opportunity to ask questions. Informed consent given to the use of the above medications. I will continue to adjust psychiatric and non-psychiatric medications (see above \"medication\" section and orders section for details) as deemed appropriate & based upon diagnoses and response to treatment. I have reviewed admission (and previous/old) labs and medical tests in the EHR and or transferring hospital documents. I will continue to order blood tests/labs and diagnostic tests as deemed appropriate and review results as they become available (see orders for details). I have reviewed old psychiatric and medical records available in the EHR. I Will order additional psychiatric records from other institutions to further elucidate the nature of patient's psychopathology and review once available.     I will gather additional collateral information from friends, family and o/p treatment team to further elucidate the nature of patient's psychopathology and baselline level of psychiatric functioning.       ESTIMATED LENGTH OF STAY:    tbd       STRENGTHS:  Access to housing/residential stability and Interpersonal/supportive relationships (family, friends, peers)                                        SIGNED:    Yann Romero MD  6/18/2018

## 2018-06-19 NOTE — BH NOTES
Pt is oriented only to self. Pt's mood is labile, affect is labile. Pt stared at this  when asked questions most times throughout day. Pt was in bed until lunch time and ate a decent amount of her lunch before returning to bed. Pt's thought process is illogical due to dementia diagnosis. Pt's insight and judgment are impaired. Pt's daughter Mallory Ann - [phone] - co- legal guardian (paperwork in chart). Daughter reports pt spends most of her day in bed. Pt's mother reports pt is usually calm and pleasantly confused. Pt's daughter reports a history of chronic UTIs which leads to pt urinating and defecating on floor. Daughter reports negative results with Aricept and no further medication review with pt's current provider - pt has not been on Namenda before and daughter is in agreement with plan. Daughter states in past pt was on 100 mg Seroquel and Ativan. Pt's daughter states that pt does not know who her daughters are during visits. Daughter states that pt normally bathes regularly and hopes pt is willing to shower here - would like for staff to encourage her to bathe. Daughter states that they have an ombudsman involved that will be requesting records due to reported difficultly with LT locked unit facility she has been living in the past 4 years - family feels they are trying to have pt removed unfairly.

## 2018-06-19 NOTE — BH NOTES
GROUP THERAPY PROGRESS NOTE    Lety Zuniga is participating in [employer].      Group time: 30 minutes    Personal goal for participation:  Unit orientation    Goal orientation: Community    Group therapy participation: Pt did not attend    Therapeutic interventions reviewed and discussed: Yes    Impression of participation: N/A

## 2018-06-20 PROCEDURE — 74011250637 HC RX REV CODE- 250/637: Performed by: INTERNAL MEDICINE

## 2018-06-20 PROCEDURE — 74011250637 HC RX REV CODE- 250/637: Performed by: PSYCHIATRY & NEUROLOGY

## 2018-06-20 PROCEDURE — 65220000003 HC RM SEMIPRIVATE PSYCH

## 2018-06-20 RX ADMIN — ASPIRIN 81 MG 81 MG: 81 TABLET ORAL at 09:27

## 2018-06-20 RX ADMIN — FAMOTIDINE 20 MG: 20 TABLET ORAL at 09:27

## 2018-06-20 RX ADMIN — MEMANTINE 5 MG: 10 TABLET ORAL at 09:27

## 2018-06-20 RX ADMIN — CARVEDILOL 3.12 MG: 3.12 TABLET, FILM COATED ORAL at 09:27

## 2018-06-20 RX ADMIN — DILTIAZEM HYDROCHLORIDE 120 MG: 120 CAPSULE, COATED, EXTENDED RELEASE ORAL at 09:41

## 2018-06-20 NOTE — BH NOTES
Patient was observed resting with eyes closed. Patient had covers over her head, and patient was breathing evenly. Patient did get up for breakfast and ate about 50% of meals. Patient continued to carry a prince bear in arms. Patient is  oriented only to name. Patient participated in groups.

## 2018-06-20 NOTE — BH NOTES
Pt isolated to room, snack brought to pt, pt ate the sandwich but did not eat the crackers or drink the ginger ale. Pt remains confused and labile. Compliant with staff. Will continue Q 15 minute monitoring for safety.

## 2018-06-20 NOTE — PROGRESS NOTES
Ms. Gris Mercedes was present in Spirituality Group about Spiritual Gifts on [address] [facility] unit    [address] [facility], .Div.    Paging Service [phone])

## 2018-06-20 NOTE — BH NOTES
GROUP THERAPY PROGRESS NOTE    The patient Mike lew 68 y.o. female is participating in Coping Skills Group. Group time: 45 minutes    Personal goal for participation:  To participate in mental health journey game    Goal orientation:  personal    Group therapy participation: minimal    Therapeutic interventions reviewed and discussed: choices in recovery    Impression of participation:  The patient was [id number] worked on individual art project    Hobert Border  6/20/2018  1:28 PM

## 2018-06-20 NOTE — BH NOTES
Pt observed resting comfortably in bed with eyes closed, breathing even and unlabored. No s/s of distress noted, no complaints voiced. Pt slept 3.5 - 4 hours, will continue Q 15 minute monitoring for safety.

## 2018-06-20 NOTE — BH NOTES
PSYCHIATRIC PROGRESS NOTE         Patient Name  Darshana Santiago   Date of Birth 1940   [facility] [id number]   Medical Record Number  [id number]      Age  68 y.o. PCP Berenice Spears, MD   Admit date:  6/17/2018    Room Number  321/01  @ [facility]   Date of Service  6/19/2018           E & M PROGRESS NOTE:         HISTORY       CC:  \"confused, memory impairment\"  HISTORY OF PRESENT ILLNESS/INTERVAL HISTORY:  (reviewed/updated 6/19/2018). per initial evaluation: The patient, Darshana Santiago, is a 68 y.o. WHITE [place]  female with a past psychiatric history significant for dementia, who presents at this time with complaints of (and/or evidence of) the following emotional symptoms: memory impairment. Additional symptomatology include disoriented to time and place, poor memory and behavior issues. pt has been incontent and need assistance with ADLs. She is irritable and easily gets loud and labile in mood. She was dc to medical unit for tx of hypotension and now back on U for further tx  The above symptoms have been present for years. These symptoms are of severe severity. These symptoms are constant  in nature. Darshana Santiago presents/reports/evidences the following emotional symptoms today, 6/19/2018:memory impairment. The above symptoms have been present for years. These symptoms are of severe severity. The symptoms are constant  in nature. Additional symptomatology and features include confused , disoriented to place and time. Pt has been refusing her medication and need assistance with ADLs. No aggression       SIDE EFFECTS: (reviewed/updated 6/19/2018)  None reported or admitted to.   No noted toxicity with use of current med   ALLERGIES:(reviewed/updated 6/19/2018)  Allergies   Allergen Reactions    Cephalosporins Itching    Codeine Hives    Erythromycin Hives    Nubain [Nalbuphine] Hives    Pcn [Penicillins] Hives    Percodan [Oxycodone Hcl-Oxycodone-Asa] Hives    Promethazine Hives    Protonix [Pantoprazole] Diarrhea    Sulfa (Sulfonamide Antibiotics) Hives    Toradol [Ketorolac] Hives    Ultram [Tramadol] Hives    Valium [Diazepam] Hives    Prilosec [Omeprazole] Itching      MEDICATIONS PRIOR TO ADMISSION:(reviewed/updated 6/19/2018)  Prescriptions Prior to Admission   Medication Sig    multivit-minerals/folic acid (WOMEN'S MULTIVITAMIN GUMMIES PO) Take 1 Tab by mouth daily.  QUEtiapine (SEROQUEL) 25 mg tablet Take 12.5 mg by mouth daily.  carvedilol (COREG) 3.125 mg tablet Take 3.125 mg by mouth two (2) times daily (with meals).  diclofenac (VOLTAREN) 1 % gel Apply 2 g to affected area every six (6) hours as needed (arthritis pain).  acetaminophen (TYLENOL) 325 mg tablet Take 650 mg by mouth every eight (8) hours as needed for Pain.  Menthol (ASPERCREME HEAT) 10 % gel by Apply Externally route every eight (8) hours as needed (arthritis).  aspirin 81 mg chewable tablet Take 81 mg by mouth daily. Indications: myocardial infarction prevention    dilTIAZem XR (DILACOR XR) 120 mg XR capsule Take 120 mg by mouth daily. Indications: hypertension, VENTRICULAR RATE CONTROL IN ATRIAL FIBRILLATION    furosemide (LASIX) 20 mg tablet Take 20 mg by mouth daily as needed. Indications: Edema, hypertension    loratadine (CLARITIN) 10 mg tablet Take 10 mg by mouth daily as needed. Indications: Allergic Rhinitis, SNEEZING    famotidine (PEPCID) 20 mg tablet Take 20 mg by mouth daily. Indications: Heartburn    QUEtiapine (SEROQUEL) 25 mg tablet Take 25 mg by mouth nightly. Indications: Generalized Anxiety Disorder      PAST MEDICAL HISTORY: Past medical history from the initial psychiatric evaluation has been reviewed (reviewed/updated 6/19/2018) with no additional updates (I asked patient and no additional past medical history provided). No past medical history on file. No past surgical history on file.    SOCIAL HISTORY: Social history from the initial psychiatric evaluation has been reviewed (reviewed/updated 6/19/2018) with no additional updates (I asked patient and no additional social history provided). Social History     Social History    Marital status: SINGLE     Spouse name: N/A    Number of children: N/A    Years of education: N/A     Occupational History    Not on file. Social History Main Topics    Smoking status: Not on file    Smokeless tobacco: Not on file    Alcohol use Not on file    Drug use: Not on file    Sexual activity: Not on file     Other Topics Concern    Not on file     Social History Narrative      FAMILY HISTORY: Family history from the initial psychiatric evaluation has been reviewed (reviewed/updated 6/19/2018) with no additional updates (I asked patient and no additional family history provided). No family history on file. REVIEW OF SYSTEMS: (reviewed/updated 6/19/2018)  Appetite:poor   Sleep: decreased more than normal   All other Review of Systems: Psychological ROS: positive for - behavioral disorder, concentration difficulties, disorientation and memory difficulties         [facility] [place] [place]    [facility] [place] FINDINGS ARE WITHIN NORMAL LIMITS (WNL) UNLESS OTHERWISE STATED BELOW. ( ALL OF THE BELOW CATEGORIES OF THE [facility] [place] HAVE BEEN REVIEWED (reviewed 6/19/2018) AND UPDATED AS DEEMED APPROPRIATE )  General Presentation age appropriate and casually dressed, unreliable and vague   Orientation disorganized   Vital Signs  See below (reviewed 6/19/2018); Vital Signs (BP, Pulse, & Temp) are within normal limits if not listed below.    Gait and Station Stable/steady, no ataxia   Musculoskeletal System No extrapyramidal symptoms (EPS); no abnormal muscular movements or Tardive Dyskinesia (TD); muscle strength and tone are within normal limits   Language No aphasia or dysarthria   Speech:  hypoverbal   Thought Processes illogical; slow rate of thoughts; poor abstract reasoning/computation   Thought Associations tangential   Thought Content free of delusions, free of hallucinations and preoccupations   Suicidal Ideations none   Homicidal Ideations none   Mood:  anxious    Affect:  anxious and increased in intensity   Memory recent  impaired   Memory remote:  impaired   Concentration/Attention:  distractable   Fund of Knowledge below average   Insight:  poor   Reliability poor   Judgment:  poor          VITALS:     Patient Vitals for the past 24 hrs:   Temp Pulse Resp BP SpO2   06/19/18 1845 - 66 16 110/55 -   06/19/18 1759 97.2 °F (36.2 °C) 70 16 139/58 98 %   06/19/18 1314 97.1 °F (36.2 °C) 62 20 - -   06/18/18 2225 - 66 16 140/64 -     Wt Readings from Last 3 Encounters:   06/18/18 54.6 kg (120 lb 4.8 oz)     Temp Readings from Last 3 Encounters:   06/19/18 97.2 °F (36.2 °C)   06/17/18 97.6 °F (36.4 °C)   06/15/18 97.6 °F (36.4 °C)     BP Readings from Last 3 Encounters:   06/19/18 110/55   06/17/18 102/47   06/16/18 99/55     Pulse Readings from Last 3 Encounters:   06/19/18 66   06/17/18 70   06/16/18 81            DATA     LABORATORY DATA:(reviewed/updated 6/19/2018)  No results found for this or any previous visit (from the past 24 hour(s)). No results found for: VALF2, VALAC, VALP, VALPR, DS6, CRBAM, CRBAMP, CARB2, XCRBAM  No results found for: LITHM   RADIOLOGY REPORTS:(reviewed/updated 6/19/2018)  Xr Chest Port    Result Date: 6/17/2018  EXAM:  XR CHEST PORT INDICATION:  Chest Pain COMPARISON:  None. FINDINGS: A portable AP radiograph of the chest was obtained at 720 hours. The patient is on a cardiac monitor. There is minor right basilar atelectasis. There is question of a vague right perihilar airspace process. There is slight diffuse interstitial prominence. Heart size is borderline enlarged. Patient status post median sternotomy. IMPRESSION: 1. There is suggestion of a vague right perihilar airspace process and follow-up studies would be helpful to assess for interval change.  There is minor right basilar atelectasis. MEDICATIONS     ALL MEDICATIONS:   Current Facility-Administered Medications   Medication Dose Route Frequency    memantine (NAMENDA) tablet 5 mg  5 mg Oral DAILY    OLANZapine (ZyPREXA) tablet 2.5 mg  2.5 mg Oral Q6H PRN    ziprasidone (GEODON) 10 mg in sterile water (preservative free) 0.5 mL injection  10 mg IntraMUSCular BID PRN    benztropine (COGENTIN) tablet 1 mg  1 mg Oral BID PRN    benztropine (COGENTIN) injection 1 mg  1 mg IntraMUSCular BID PRN    zolpidem (AMBIEN) tablet 5 mg  5 mg Oral QHS PRN    acetaminophen (TYLENOL) tablet 650 mg  650 mg Oral Q4H PRN    magnesium hydroxide (MILK OF MAGNESIA) 400 mg/5 mL oral suspension 30 mL  30 mL Oral DAILY PRN    nicotine (NICODERM CQ) 21 mg/24 hr patch 1 Patch  1 Patch TransDERmal DAILY PRN    aspirin chewable tablet 81 mg  81 mg Oral DAILY    famotidine (PEPCID) tablet 20 mg  20 mg Oral DAILY    carvedilol (COREG) tablet 3.125 mg  3.125 mg Oral BID WITH MEALS    dilTIAZem CD (CARDIZEM CD) capsule 120 mg  120 mg Oral DAILY      SCHEDULED MEDICATIONS:   Current Facility-Administered Medications   Medication Dose Route Frequency    memantine (NAMENDA) tablet 5 mg  5 mg Oral DAILY    aspirin chewable tablet 81 mg  81 mg Oral DAILY    famotidine (PEPCID) tablet 20 mg  20 mg Oral DAILY    carvedilol (COREG) tablet 3.125 mg  3.125 mg Oral BID WITH MEALS    dilTIAZem CD (CARDIZEM CD) capsule 120 mg  120 mg Oral DAILY          ASSESSMENT & PLAN     DIAGNOSES REQUIRING ACTIVE TREATMENT AND MONITORING: (reviewed/updated 6/19/2018)  Patient Active Hospital Problem List:   Dementia (6/17/2018)    Assessment: moderate to severe- disoriented, poor memory and irritable. Need total care    Plan: consider Namenda.  - will titrate further     Hypotension  A: recent dc from medical unit  P: ct to monitor by IM- cautious with AP- dc Seroquel        In summary, Bina Self, is a 68 y.o.  female who presents with a severe exacerbation of the principal diagnosis of Dementia  Patient's condition is worsening/not improving/not stable . Patient requires continued inpatient hospitalization for further stabilization, safety monitoring and medication management. I will continue to coordinate the provision of individual, milieu, occupational, group, and substance abuse therapies to address target symptoms/diagnoses as deemed appropriate for the individual patient. A coordinated, multidisplinary treatment team round was conducted with the patient (this team consists of the nurse, psychiatric unit pharmcist,  and writer). Complete current electronic health record for patient has been reviewed today including consultant notes, ancillary staff notes, nurses and psychiatric tech notes. Suicide risk assessment completed and patient deemed to be of low risk for suicide at this time. The following regarding medications was addressed during rounds with patient:   the risks and benefits of the proposed medication. The patient was given the opportunity to ask questions. Informed consent given to the use of the above medications. Will continue to adjust psychiatric and non-psychiatric medications (see above \"medication\" section and orders section for details) as deemed appropriate & based upon diagnoses and response to treatment. I will continue to order blood tests/labs and diagnostic tests as deemed appropriate and review results as they become available (see orders for details and above listed lab/test results). I will order psychiatric records from previous [facility] hospitals to further elucidate the nature of patient's psychopathology and review once available. I will gather additional collateral information from friends, family and o/p treatment team to further elucidate the nature of patient's psychopathology and baselline level of psychiatric functioning.          I certify that this patient's inpatient psychiatric hospital services furnished since the previous certification were, and continue to be, required for treatment that could reasonably be expected to improve the patient's condition, or for diagnostic study, and that the patient continues to need, on a daily basis, active treatment furnished directly by or requiring the supervision of inpatient psychiatric facility personnel. In addition, the hospital records show that services furnished were intensive treatment services, admission or related services, or equivalent services.     EXPECTED DISCHARGE DATE/DAY: TBD     DISPOSITION: Home       Signed By:   Gaurav Cole MD  6/19/2018

## 2018-06-21 PROCEDURE — 74011250637 HC RX REV CODE- 250/637: Performed by: PSYCHIATRY & NEUROLOGY

## 2018-06-21 PROCEDURE — 74011250637 HC RX REV CODE- 250/637: Performed by: INTERNAL MEDICINE

## 2018-06-21 PROCEDURE — 65220000003 HC RM SEMIPRIVATE PSYCH

## 2018-06-21 RX ORDER — MEMANTINE HYDROCHLORIDE 10 MG/1
5 TABLET ORAL 2 TIMES DAILY
Status: DISCONTINUED | OUTPATIENT
Start: 2018-06-21 | End: 2018-07-02

## 2018-06-21 RX ADMIN — MEMANTINE 5 MG: 10 TABLET ORAL at 18:21

## 2018-06-21 RX ADMIN — FAMOTIDINE 20 MG: 20 TABLET ORAL at 10:00

## 2018-06-21 RX ADMIN — CARVEDILOL 3.12 MG: 3.12 TABLET, FILM COATED ORAL at 17:28

## 2018-06-21 RX ADMIN — CARVEDILOL 3.12 MG: 3.12 TABLET, FILM COATED ORAL at 10:00

## 2018-06-21 RX ADMIN — MEMANTINE 5 MG: 10 TABLET ORAL at 10:00

## 2018-06-21 RX ADMIN — DILTIAZEM HYDROCHLORIDE 120 MG: 120 TABLET, EXTENDED RELEASE ORAL at 09:59

## 2018-06-21 RX ADMIN — ASPIRIN 81 MG 81 MG: 81 TABLET ORAL at 10:00
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
## 2018-06-21 NOTE — BH NOTES
Patient continues to be alert to self only. Patient stated that she was in [place]. Patient was received this morning in bed incontinent of urine and stool. Patient attended to group today and ate her meals; patient ate about 60%. Patient has required some redirecting, but has been cooperative.

## 2018-06-21 NOTE — PROGRESS NOTES
Problem: Dementia-BEHAVIORAL HEALTH (Adult/Pediatric)  Goal: *STG: Participates in treatment plan  Outcome: Progressing Towards Goal  Pt slept 7 1/2 hours. No concerns overnight. Respirations even and unlabored. Staff will continue to monitor for health and safety.

## 2018-06-21 NOTE — BH NOTES
GROUP THERAPY PROGRESS NOTE    The patient Charity lew 68 y.o. female is participating in Coping Skills Group.      Group time: 45 minutes    Personal goal for participation: To develop a personal plan for success    Goal orientation:  personal    Group therapy participation: minimal    Therapeutic interventions reviewed and discussed: positive coping strategies/goals    Impression of participation:  The patient was present-confused however did seem to listen [id number] response to open ended questions was yes or no    Marques Arredondo  6/21/2018  2:06 PM

## 2018-06-21 NOTE — PROGRESS NOTES
Problem: Dementia-BEHAVIORAL HEALTH (Adult/Pediatric)  Goal: *STG: Remains safe in hospital  Outcome: Progressing Towards Goal  Pt out of room for meals. Pt mood labile but more cooperative. Pt accepting of staff interventions for safety and comfort. Staff will continue to monitor for health and safety.

## 2018-06-21 NOTE — BH NOTES
PSYCHIATRIC PROGRESS NOTE         Patient Name  Chloe Romano   Date of Birth 1940   [facility] [id number]   Medical Record Number  [id number]      Age  68 y.o. PCP Berenice Spears, MD   Admit date:  6/17/2018    Room Number  321/01  @ [facility]   Date of Service  6/21/2018           E & M PROGRESS NOTE:         HISTORY       CC:  \"confused, memory impairment\"  HISTORY OF PRESENT ILLNESS/INTERVAL HISTORY:  (reviewed/updated 6/21/2018). per initial evaluation: The patient, Chloe Romano, is a 68 y.o. WHITE [place]  female with a past psychiatric history significant for dementia, who presents at this time with complaints of (and/or evidence of) the following emotional symptoms: memory impairment. Additional symptomatology include disoriented to time and place, poor memory and behavior issues. pt has been incontent and need assistance with ADLs. She is irritable and easily gets loud and labile in mood. She was dc to medical unit for tx of hypotension and now back on U for further tx  The above symptoms have been present for years. These symptoms are of severe severity. These symptoms are constant  in nature. Chloe Romano presents/reports/evidences the following emotional symptoms today, 6/21/2018:memory impairment. The above symptoms have been present for years. These symptoms are of moderate severity. The symptoms are constant  in nature. Additional symptomatology and features include confused , disoriented to place and time. Pt is now accepting her med and need assistance with ADLs due to incontinence. No aggressive behavior and affect is better, sleep is improving      SIDE EFFECTS: (reviewed/updated 6/21/2018)  None reported or admitted to.   No noted toxicity with use of current med   ALLERGIES:(reviewed/updated 6/21/2018)  Allergies   Allergen Reactions    Cephalosporins Itching    Codeine Hives    Erythromycin Hives    Nubain [Nalbuphine] Hives    Pcn [Penicillins] Hives  Percodan [Oxycodone Hcl-Oxycodone-Asa] Hives    Promethazine Hives    Protonix [Pantoprazole] Diarrhea    Sulfa (Sulfonamide Antibiotics) Hives    Toradol [Ketorolac] Hives    Ultram [Tramadol] Hives    Valium [Diazepam] Hives    Prilosec [Omeprazole] Itching      MEDICATIONS PRIOR TO ADMISSION:(reviewed/updated 6/21/2018)  Prescriptions Prior to Admission   Medication Sig    multivit-minerals/folic acid (WOMEN'S MULTIVITAMIN GUMMIES PO) Take 1 Tab by mouth daily.  QUEtiapine (SEROQUEL) 25 mg tablet Take 12.5 mg by mouth daily.  carvedilol (COREG) 3.125 mg tablet Take 3.125 mg by mouth two (2) times daily (with meals).  diclofenac (VOLTAREN) 1 % gel Apply 2 g to affected area every six (6) hours as needed (arthritis pain).  acetaminophen (TYLENOL) 325 mg tablet Take 650 mg by mouth every eight (8) hours as needed for Pain.  Menthol (ASPERCREME HEAT) 10 % gel by Apply Externally route every eight (8) hours as needed (arthritis).  aspirin 81 mg chewable tablet Take 81 mg by mouth daily. Indications: myocardial infarction prevention    dilTIAZem XR (DILACOR XR) 120 mg XR capsule Take 120 mg by mouth daily. Indications: hypertension, VENTRICULAR RATE CONTROL IN ATRIAL FIBRILLATION    furosemide (LASIX) 20 mg tablet Take 20 mg by mouth daily as needed. Indications: Edema, hypertension    loratadine (CLARITIN) 10 mg tablet Take 10 mg by mouth daily as needed. Indications: Allergic Rhinitis, SNEEZING    famotidine (PEPCID) 20 mg tablet Take 20 mg by mouth daily. Indications: Heartburn    QUEtiapine (SEROQUEL) 25 mg tablet Take 25 mg by mouth nightly. Indications: Generalized Anxiety Disorder      PAST MEDICAL HISTORY: Past medical history from the initial psychiatric evaluation has been reviewed (reviewed/updated 6/21/2018) with no additional updates (I asked patient and no additional past medical history provided). No past medical history on file. No past surgical history on file. SOCIAL HISTORY: Social history from the initial psychiatric evaluation has been reviewed (reviewed/updated 6/21/2018) with no additional updates (I asked patient and no additional social history provided). Social History     Social History    Marital status: SINGLE     Spouse name: N/A    Number of children: N/A    Years of education: N/A     Occupational History    Not on file. Social History Main Topics    Smoking status: Not on file    Smokeless tobacco: Not on file    Alcohol use Not on file    Drug use: Not on file    Sexual activity: Not on file     Other Topics Concern    Not on file     Social History Narrative      FAMILY HISTORY: Family history from the initial psychiatric evaluation has been reviewed (reviewed/updated 6/21/2018) with no additional updates (I asked patient and no additional family history provided). No family history on file. REVIEW OF SYSTEMS: (reviewed/updated 6/21/2018)  Appetite:poor   Sleep: decreased more than normal   All other Review of Systems: Psychological ROS: positive for - behavioral disorder, concentration difficulties, disorientation and memory difficulties         [address] [facility] ([facility]):    [facility] FINDINGS ARE WITHIN NORMAL LIMITS (WNL) UNLESS OTHERWISE STATED BELOW. ( ALL OF THE BELOW CATEGORIES OF THE [facility] HAVE BEEN REVIEWED (reviewed 6/21/2018) AND UPDATED AS DEEMED APPROPRIATE )  General Presentation age appropriate and casually dressed, unreliable and vague   Orientation disorganized   Vital Signs  See below (reviewed 6/21/2018); Vital Signs (BP, Pulse, & Temp) are within normal limits if not listed below.    Gait and Station Stable/steady, no ataxia   Musculoskeletal System No extrapyramidal symptoms (EPS); no abnormal muscular movements or Tardive Dyskinesia (TD); muscle strength and tone are within normal limits   Language No aphasia or dysarthria   Speech:  hypoverbal   Thought Processes illogical; slow rate of thoughts; poor abstract reasoning/computation   Thought Associations tangential   Thought Content free of delusions, free of hallucinations and preoccupations   Suicidal Ideations none   Homicidal Ideations none   Mood:  anxious    Affect:  anxious and increased in intensity   Memory recent  impaired   Memory remote:  impaired   Concentration/Attention:  distractable   Fund of Knowledge below average   Insight:  poor   Reliability poor   Judgment:  poor          VITALS:     Patient Vitals for the past 24 hrs:   Temp Pulse Resp BP   06/21/18 1733 - - - 112/66   06/21/18 0959 - (!) 105 - 129/84   06/20/18 2007 - (!) 58 16 138/60     Wt Readings from Last 3 Encounters:   06/18/18 54.6 kg (120 lb 4.8 oz)     Temp Readings from Last 3 Encounters:   06/17/18 97.6 °F (36.4 °C)   06/15/18 97.6 °F (36.4 °C)     BP Readings from Last 3 Encounters:   06/21/18 112/66   06/17/18 102/47   06/16/18 99/55     Pulse Readings from Last 3 Encounters:   06/21/18 (!) 105   06/17/18 70   06/16/18 81            DATA     LABORATORY DATA:(reviewed/updated 6/21/2018)  No results found for this or any previous visit (from the past 24 hour(s)). No results found for: VALF2, VALAC, VALP, VALPR, DS6, CRBAM, CRBAMP, CARB2, XCRBAM  No results found for: LITHM   RADIOLOGY REPORTS:(reviewed/updated 6/21/2018)  Xr Chest Port    Result Date: 6/17/2018  EXAM:  XR CHEST PORT INDICATION:  Chest Pain COMPARISON:  None. FINDINGS: A portable AP radiograph of the chest was obtained at 720 hours. The patient is on a cardiac monitor. There is minor right basilar atelectasis. There is question of a vague right perihilar airspace process. There is slight diffuse interstitial prominence. Heart size is borderline enlarged. Patient status post median sternotomy. IMPRESSION: 1. There is suggestion of a vague right perihilar airspace process and follow-up studies would be helpful to assess for interval change. There is minor right basilar atelectasis. MEDICATIONS     ALL MEDICATIONS:   Current Facility-Administered Medications   Medication Dose Route Frequency    memantine (NAMENDA) tablet 5 mg  5 mg Oral BID    dilTIAZem ER (CARDIZEM LA) tablet 120 mg  120 mg Oral DAILY    OLANZapine (ZyPREXA) tablet 2.5 mg  2.5 mg Oral Q6H PRN    ziprasidone (GEODON) 10 mg in sterile water (preservative free) 0.5 mL injection  10 mg IntraMUSCular BID PRN    benztropine (COGENTIN) tablet 1 mg  1 mg Oral BID PRN    benztropine (COGENTIN) injection 1 mg  1 mg IntraMUSCular BID PRN    zolpidem (AMBIEN) tablet 5 mg  5 mg Oral QHS PRN    acetaminophen (TYLENOL) tablet 650 mg  650 mg Oral Q4H PRN    magnesium hydroxide (MILK OF MAGNESIA) 400 mg/5 mL oral suspension 30 mL  30 mL Oral DAILY PRN    nicotine (NICODERM CQ) 21 mg/24 hr patch 1 Patch  1 Patch TransDERmal DAILY PRN    aspirin chewable tablet 81 mg  81 mg Oral DAILY    famotidine (PEPCID) tablet 20 mg  20 mg Oral DAILY    carvedilol (COREG) tablet 3.125 mg  3.125 mg Oral BID WITH MEALS      SCHEDULED MEDICATIONS:   Current Facility-Administered Medications   Medication Dose Route Frequency    memantine (NAMENDA) tablet 5 mg  5 mg Oral BID    dilTIAZem ER (CARDIZEM LA) tablet 120 mg  120 mg Oral DAILY    aspirin chewable tablet 81 mg  81 mg Oral DAILY    famotidine (PEPCID) tablet 20 mg  20 mg Oral DAILY    carvedilol (COREG) tablet 3.125 mg  3.125 mg Oral BID WITH MEALS          ASSESSMENT & PLAN     DIAGNOSES REQUIRING ACTIVE TREATMENT AND MONITORING: (reviewed/updated 6/21/2018)  Patient Active Hospital Problem List:   Dementia (6/17/2018)    Assessment: moderate to severe- disoriented, poor memory and irritable. Need total care. No behavior problem and accepting med    Plan:  Will Moses.  - optimize dose- 5 mg bid     Hypotension  A: recent dc from medical unit- BP is improving  P: ct to monitor by IM- cautious with AP- dc Seroquel        In summary, Mike Diaz, is a 68 y.o.  female who presents with a severe exacerbation of the principal diagnosis of Dementia  Patient's condition is worsening/not improving/not stable . Patient requires continued inpatient hospitalization for further stabilization, safety monitoring and medication management. I will continue to coordinate the provision of individual, milieu, occupational, group, and substance abuse therapies to address target symptoms/diagnoses as deemed appropriate for the individual patient. A coordinated, multidisplinary treatment team round was conducted with the patient (this team consists of the nurse, psychiatric unit pharmcist,  and writer). Complete current electronic health record for patient has been reviewed today including consultant notes, ancillary staff notes, nurses and psychiatric tech notes. Suicide risk assessment completed and patient deemed to be of low risk for suicide at this time. The following regarding medications was addressed during rounds with patient:   the risks and benefits of the proposed medication. The patient was given the opportunity to ask questions. Informed consent given to the use of the above medications. Will continue to adjust psychiatric and non-psychiatric medications (see above \"medication\" section and orders section for details) as deemed appropriate & based upon diagnoses and response to treatment. I will continue to order blood tests/labs and diagnostic tests as deemed appropriate and review results as they become available (see orders for details and above listed lab/test results). I will order psychiatric records from previous [facility] hospitals to further elucidate the nature of patient's psychopathology and review once available. I will gather additional collateral information from friends, family and o/p treatment team to further elucidate the nature of patient's psychopathology and baselline level of psychiatric functioning.          I certify that this patient's inpatient psychiatric hospital services furnished since the previous certification were, and continue to be, required for treatment that could reasonably be expected to improve the patient's condition, or for diagnostic study, and that the patient continues to need, on a daily basis, active treatment furnished directly by or requiring the supervision of inpatient psychiatric facility personnel. In addition, the hospital records show that services furnished were intensive treatment services, admission or related services, or equivalent services.     EXPECTED DISCHARGE DATE/DAY: TBD     DISPOSITION: Home/penitentiary       Signed By:   Patricia Barcenas MD  6/21/2018

## 2018-06-22 PROCEDURE — 74011250637 HC RX REV CODE- 250/637: Performed by: INTERNAL MEDICINE

## 2018-06-22 PROCEDURE — 65220000003 HC RM SEMIPRIVATE PSYCH

## 2018-06-22 PROCEDURE — 74011250637 HC RX REV CODE- 250/637: Performed by: PSYCHIATRY & NEUROLOGY

## 2018-06-22 RX ADMIN — FAMOTIDINE 20 MG: 20 TABLET ORAL at 08:30

## 2018-06-22 RX ADMIN — MEMANTINE 5 MG: 10 TABLET ORAL at 08:30

## 2018-06-22 RX ADMIN — ASPIRIN 81 MG 81 MG: 81 TABLET ORAL at 08:31

## 2018-06-22 RX ADMIN — CARVEDILOL 3.12 MG: 3.12 TABLET, FILM COATED ORAL at 08:30

## 2018-06-22 RX ADMIN — MEMANTINE 5 MG: 10 TABLET ORAL at 17:15

## 2018-06-22 NOTE — BH NOTES
Problem: Altered Thought Process (Adult/Pediatric)  Goal: *STG: Remains safe in hospital  Outcome: Progressing Towards Goal  Pt is mostly isolative from the milieu. Pt presents as alert but quiet and disoriented x 3. Pt is meds/meals compliant but appetite is poor. Pt has been encouraged to come out of her room more and eat better. Pt was present in her treatment team meeting today. Will continue to monitor q 15 for safety, mood and behavior changes.

## 2018-06-22 NOTE — BH NOTES
Pt is oriented only to self.  Pt is attending groups, sleeping well and meal compliant.  Pt's mood is better and affect is brighter.  Pt's thought process is illogical due to dementia diagnosis. Pt was discharged after ECO from her current facility. This  has begun [facility] and reached out to [facility] in [place], [place] and [place] - awaiting calls back about bed availability.

## 2018-06-22 NOTE — BH NOTES
PSYCHIATRIC PROGRESS NOTE         Patient Name  Aime Siddiqui   Date of Birth 1940   CSN [id number]   Medical Record Number  [id number]      Age  68 y.o. PCP Berenice Spears, MD   Admit date:  6/17/2018    Room Number  321/01  @ [facility]   Date of Service  6/22/2018           E & M PROGRESS NOTE:         HISTORY       CC:  \"confused, memory impairment\"  HISTORY OF PRESENT ILLNESS/INTERVAL HISTORY:  (reviewed/updated 6/22/2018). per initial evaluation: The patient, Aime Siddiqui, is a 68 y.o. WHITE [place]  female with a past psychiatric history significant for dementia, who presents at this time with complaints of (and/or evidence of) the following emotional symptoms: memory impairment. Additional symptomatology include disoriented to time and place, poor memory and behavior issues. pt has been incontent and need assistance with ADLs. She is irritable and easily gets loud and labile in mood. She was dc to medical unit for tx of hypotension and now back on U for further tx  The above symptoms have been present for years. These symptoms are of severe severity. These symptoms are constant  in nature. Aime Siddiqui presents/reports/evidences the following emotional symptoms today, 6/22/2018:memory impairment. The above symptoms have been present for years. These symptoms are of moderate severity. The symptoms are constant  in nature. Additional symptomatology and features include confused , disoriented to place and time. Pt is now accepting her med and need assistance with ADLs due to incontinence. No aggressive behavior and affect is better, sleep is improving. Pt tend to stay in bed after breakfast but pleasant and co operative      SIDE EFFECTS: (reviewed/updated 6/22/2018)  None reported or admitted to.   No noted toxicity with use of current med   ALLERGIES:(reviewed/updated 6/22/2018)  Allergies   Allergen Reactions    Cephalosporins Itching    Codeine Hives    Erythromycin Hives    Nubain [Nalbuphine] Hives    Pcn [Penicillins] Hives    Percodan [Oxycodone Hcl-Oxycodone-Asa] Hives    Promethazine Hives    Protonix [Pantoprazole] Diarrhea    Sulfa (Sulfonamide Antibiotics) Hives    Toradol [Ketorolac] Hives    Ultram [Tramadol] Hives    Valium [Diazepam] Hives    Prilosec [Omeprazole] Itching      MEDICATIONS PRIOR TO ADMISSION:(reviewed/updated 6/22/2018)  Prescriptions Prior to Admission   Medication Sig    multivit-minerals/folic acid (WOMEN'S MULTIVITAMIN GUMMIES PO) Take 1 Tab by mouth daily.  QUEtiapine (SEROQUEL) 25 mg tablet Take 12.5 mg by mouth daily.  carvedilol (COREG) 3.125 mg tablet Take 3.125 mg by mouth two (2) times daily (with meals).  diclofenac (VOLTAREN) 1 % gel Apply 2 g to affected area every six (6) hours as needed (arthritis pain).  acetaminophen (TYLENOL) 325 mg tablet Take 650 mg by mouth every eight (8) hours as needed for Pain.  Menthol (ASPERCREME HEAT) 10 % gel by Apply Externally route every eight (8) hours as needed (arthritis).  aspirin 81 mg chewable tablet Take 81 mg by mouth daily. Indications: myocardial infarction prevention    dilTIAZem XR (DILACOR XR) 120 mg XR capsule Take 120 mg by mouth daily. Indications: hypertension, VENTRICULAR RATE CONTROL IN ATRIAL FIBRILLATION    furosemide (LASIX) 20 mg tablet Take 20 mg by mouth daily as needed. Indications: Edema, hypertension    loratadine (CLARITIN) 10 mg tablet Take 10 mg by mouth daily as needed. Indications: Allergic Rhinitis, SNEEZING    famotidine (PEPCID) 20 mg tablet Take 20 mg by mouth daily. Indications: Heartburn    QUEtiapine (SEROQUEL) 25 mg tablet Take 25 mg by mouth nightly.  Indications: Generalized Anxiety Disorder      PAST MEDICAL HISTORY: Past medical history from the initial psychiatric evaluation has been reviewed (reviewed/updated 6/22/2018) with no additional updates (I asked patient and no additional past medical history provided). No past medical history on file. No past surgical history on file. SOCIAL HISTORY: Social history from the initial psychiatric evaluation has been reviewed (reviewed/updated 6/22/2018) with no additional updates (I asked patient and no additional social history provided). Social History     Social History    Marital status: SINGLE     Spouse name: N/A    Number of children: N/A    Years of education: N/A     Occupational History    Not on file. Social History Main Topics    Smoking status: Not on file    Smokeless tobacco: Not on file    Alcohol use Not on file    Drug use: Not on file    Sexual activity: Not on file     Other Topics Concern    Not on file     Social History Narrative      FAMILY HISTORY: Family history from the initial psychiatric evaluation has been reviewed (reviewed/updated 6/22/2018) with no additional updates (I asked patient and no additional family history provided). No family history on file. REVIEW OF SYSTEMS: (reviewed/updated 6/22/2018)  Appetite:poor   Sleep: decreased more than normal   All other Review of Systems: Psychological ROS: positive for - behavioral disorder, concentration difficulties, disorientation and memory difficulties         [facility] ([facility]):    [facility] FINDINGS ARE WITHIN NORMAL LIMITS (WNL) UNLESS OTHERWISE STATED BELOW. ( ALL OF THE BELOW CATEGORIES OF THE [facility] HAVE BEEN REVIEWED (reviewed 6/22/2018) AND UPDATED AS DEEMED APPROPRIATE )  General Presentation age appropriate and casually dressed, unreliable and vague   Orientation disorganized   Vital Signs  See below (reviewed 6/22/2018); Vital Signs (BP, Pulse, & Temp) are within normal limits if not listed below.    Gait and Station Stable/steady, no ataxia   Musculoskeletal System No extrapyramidal symptoms (EPS); no abnormal muscular movements or Tardive Dyskinesia (TD); muscle strength and tone are within normal limits   Language No aphasia or dysarthria   Speech:  hypoverbal   Thought Processes illogical; slow rate of thoughts; poor abstract reasoning/computation   Thought Associations tangential   Thought Content free of delusions, free of hallucinations and preoccupations   Suicidal Ideations none   Homicidal Ideations none   Mood:  anxious    Affect:  anxious and increased in intensity   Memory recent  impaired   Memory remote:  impaired   Concentration/Attention:  distractable   Fund of Knowledge below average   Insight:  poor   Reliability poor   Judgment:  poor          VITALS:     Patient Vitals for the past 24 hrs:   Temp Pulse Resp BP   06/22/18 0827 - (!) 53 - 116/57   06/22/18 0647 97.9 °F (36.6 °C) (!) 50 16 120/59   06/21/18 1733 - - - 112/66     Wt Readings from Last 3 Encounters:   06/18/18 54.6 kg (120 lb 4.8 oz)     Temp Readings from Last 3 Encounters:   06/22/18 97.9 °F (36.6 °C)   06/17/18 97.6 °F (36.4 °C)   06/15/18 97.6 °F (36.4 °C)     BP Readings from Last 3 Encounters:   06/22/18 116/57   06/17/18 102/47   06/16/18 99/55     Pulse Readings from Last 3 Encounters:   06/22/18 (!) 53   06/17/18 70   06/16/18 81            DATA     LABORATORY DATA:(reviewed/updated 6/22/2018)  No results found for this or any previous visit (from the past 24 hour(s)). No results found for: VALF2, VALAC, VALP, VALPR, DS6, CRBAM, CRBAMP, CARB2, XCRBAM  No results found for: LITHM   RADIOLOGY REPORTS:(reviewed/updated 6/22/2018)  Xr Chest Port    Result Date: 6/17/2018  EXAM:  XR CHEST PORT INDICATION:  Chest Pain COMPARISON:  None. FINDINGS: A portable AP radiograph of the chest was obtained at 720 hours. The patient is on a cardiac monitor. There is minor right basilar atelectasis. There is question of a vague right perihilar airspace process. There is slight diffuse interstitial prominence. Heart size is borderline enlarged. Patient status post median sternotomy. IMPRESSION: 1.  There is suggestion of a vague right perihilar airspace process and follow-up studies would be helpful to assess for interval change. There is minor right basilar atelectasis. MEDICATIONS     ALL MEDICATIONS:   Current Facility-Administered Medications   Medication Dose Route Frequency    memantine (NAMENDA) tablet 5 mg  5 mg Oral BID    dilTIAZem ER (CARDIZEM LA) tablet 120 mg  120 mg Oral DAILY    OLANZapine (ZyPREXA) tablet 2.5 mg  2.5 mg Oral Q6H PRN    ziprasidone (GEODON) 10 mg in sterile water (preservative free) 0.5 mL injection  10 mg IntraMUSCular BID PRN    benztropine (COGENTIN) tablet 1 mg  1 mg Oral BID PRN    benztropine (COGENTIN) injection 1 mg  1 mg IntraMUSCular BID PRN    zolpidem (AMBIEN) tablet 5 mg  5 mg Oral QHS PRN    acetaminophen (TYLENOL) tablet 650 mg  650 mg Oral Q4H PRN    magnesium hydroxide (MILK OF MAGNESIA) 400 mg/5 mL oral suspension 30 mL  30 mL Oral DAILY PRN    nicotine (NICODERM CQ) 21 mg/24 hr patch 1 Patch  1 Patch TransDERmal DAILY PRN    aspirin chewable tablet 81 mg  81 mg Oral DAILY    famotidine (PEPCID) tablet 20 mg  20 mg Oral DAILY    carvedilol (COREG) tablet 3.125 mg  3.125 mg Oral BID WITH MEALS      SCHEDULED MEDICATIONS:   Current Facility-Administered Medications   Medication Dose Route Frequency    memantine (NAMENDA) tablet 5 mg  5 mg Oral BID    dilTIAZem ER (CARDIZEM LA) tablet 120 mg  120 mg Oral DAILY    aspirin chewable tablet 81 mg  81 mg Oral DAILY    famotidine (PEPCID) tablet 20 mg  20 mg Oral DAILY    carvedilol (COREG) tablet 3.125 mg  3.125 mg Oral BID WITH MEALS          ASSESSMENT & PLAN     DIAGNOSES REQUIRING ACTIVE TREATMENT AND MONITORING: (reviewed/updated 6/22/2018)  Patient Active Hospital Problem List:   Dementia (6/17/2018)    Assessment: moderate to severe- disoriented, poor memory and irritable. Need total care. No behavior problem and accepting med    Plan:  [facility]. - optimize dose- 5 mg bid.  Pt has lost her [facility]- will need placement   Hypotension  A: recent dc from medical unit- BP is improving  P: ct to monitor by DAIANA koroma with EVEL[facility] Martinez        In summary, Niesha Talley, is a 68 y.o.  female who presents with a severe exacerbation of the principal diagnosis of Dementia  Patient's condition is worsening/not improving/not stable . Patient requires continued inpatient hospitalization for further stabilization, safety monitoring and medication management. I will continue to coordinate the provision of individual, milieu, occupational, group, and substance abuse therapies to address target symptoms/diagnoses as deemed appropriate for the individual patient. A coordinated, multidisplinary treatment team round was conducted with the patient (this team consists of the nurse, psychiatric unit pharmcist,  and writer). Complete current electronic health record for patient has been reviewed today including consultant notes, ancillary staff notes, nurses and psychiatric tech notes. Suicide risk assessment completed and patient deemed to be of low risk for suicide at this time. The following regarding medications was addressed during rounds with patient:   the risks and benefits of the proposed medication. The patient was given the opportunity to ask questions. Informed consent given to the use of the above medications. Will continue to adjust psychiatric and non-psychiatric medications (see above \"medication\" section and orders section for details) as deemed appropriate & based upon diagnoses and response to treatment. I will continue to order blood tests/labs and diagnostic tests as deemed appropriate and review results as they become available (see orders for details and above listed lab/test results). I will order psychiatric records from previous [facility] hospitals to further elucidate the nature of patient's psychopathology and review once available.     I will gather additional collateral information from friends, family and o/p treatment team to further elucidate the nature of patient's psychopathology and baselline level of psychiatric functioning. I certify that this patient's inpatient psychiatric hospital services furnished since the previous certification were, and continue to be, required for treatment that could reasonably be expected to improve the patient's condition, or for diagnostic study, and that the patient continues to need, on a daily basis, active treatment furnished directly by or requiring the supervision of inpatient psychiatric facility personnel. In addition, the hospital records show that services furnished were intensive treatment services, admission or related services, or equivalent services.     EXPECTED DISCHARGE DATE/DAY: TBD     DISPOSITION: Home/[facility]       Signed By:   Kelly Cline MD  6/22/2018

## 2018-06-22 NOTE — PROGRESS NOTES
Patient was cleaned from stool she had wiped over the bedding. Lined and clothes changed. Toileted patient and made her comfortable after dinner.

## 2018-06-23 PROCEDURE — 74011250637 HC RX REV CODE- 250/637: Performed by: PSYCHIATRY & NEUROLOGY

## 2018-06-23 PROCEDURE — 74011250637 HC RX REV CODE- 250/637: Performed by: INTERNAL MEDICINE

## 2018-06-23 PROCEDURE — 65220000003 HC RM SEMIPRIVATE PSYCH

## 2018-06-23 RX ADMIN — DILTIAZEM HYDROCHLORIDE 120 MG: 120 TABLET, EXTENDED RELEASE ORAL at 08:13

## 2018-06-23 RX ADMIN — FAMOTIDINE 20 MG: 20 TABLET ORAL at 08:15

## 2018-06-23 RX ADMIN — CARVEDILOL 3.12 MG: 3.12 TABLET, FILM COATED ORAL at 08:16

## 2018-06-23 RX ADMIN — ASPIRIN 81 MG 81 MG: 81 TABLET ORAL at 08:16

## 2018-06-23 RX ADMIN — CARVEDILOL 3.12 MG: 3.12 TABLET, FILM COATED ORAL at 16:21

## 2018-06-23 RX ADMIN — MEMANTINE 5 MG: 10 TABLET ORAL at 08:15

## 2018-06-23 RX ADMIN — MEMANTINE 5 MG: 10 TABLET ORAL at 16:21

## 2018-06-23 NOTE — BH NOTES
Problem: Altered Thought Process (Adult/Pediatric)  Goal: *STG: Remains safe in hospital  Outcome: Progressing Towards Goal  Pt is mostly isolative from the milieu. Pt presents as alert but quiet and disoriented x 3 and in total confusion due to dementia. Pt is meds/meals compliant but appetite is poor. Pt has been encouraged to come out of her room more and eat better. Pt was present in her treatment team meeting today. Will continue to monitor q 15 for safety, mood and behavior changes.

## 2018-06-23 NOTE — PROGRESS NOTES
Problem: Dementia-BEHAVIORAL HEALTH (Adult/Pediatric)  Goal: *STG: Remains safe in hospital  Outcome: Progressing Towards Goal  Pt is disoriented x4. Calm and Cooperative. Appropriate and visible in the milieu. Attending groups. Mood is good. Meds/meal compliant. No PRNs given this shift. No behavioral issues. Denies suicidal and homicidal ideations. Denies auditory and visual hallucinations. Will continue to monitor pt with q15 min rounds for safety.

## 2018-06-23 NOTE — PROGRESS NOTES
After patient came out for lunch she did some crafts, socialized with peers and stayed in day room for remainder of the day. Affect is somber but brightens easily. Appetite good, med compliant.

## 2018-06-23 NOTE — PROGRESS NOTES
Problem: Dementia-BEHAVIORAL HEALTH (Adult/Pediatric)  Goal: *STG: Remains safe in hospital  Outcome: Progressing Towards Goal  Pt slept 4 hours. Pt had uneventful night and required no PRN's. Pt had no complaints or signs of distress throughout night. Respirations were unlabored. Continuing to monitor with q15 min rounds for safety.

## 2018-06-23 NOTE — PROGRESS NOTES
Patient has no interest in getting out of bed before 1000 most days, Personal hygiene is very poor. This writer gave janes and general care many times. Patient not oriented to anything but person. . 15 minute checks performed for safety.

## 2018-06-24 PROCEDURE — 74011250637 HC RX REV CODE- 250/637: Performed by: PSYCHIATRY & NEUROLOGY

## 2018-06-24 PROCEDURE — 74011250637 HC RX REV CODE- 250/637: Performed by: INTERNAL MEDICINE

## 2018-06-24 PROCEDURE — 65220000003 HC RM SEMIPRIVATE PSYCH

## 2018-06-24 RX ADMIN — FAMOTIDINE 20 MG: 20 TABLET ORAL at 08:28

## 2018-06-24 RX ADMIN — ASPIRIN 81 MG 81 MG: 81 TABLET ORAL at 08:28

## 2018-06-24 RX ADMIN — DILTIAZEM HYDROCHLORIDE 120 MG: 120 TABLET, EXTENDED RELEASE ORAL at 08:28

## 2018-06-24 RX ADMIN — CARVEDILOL 3.12 MG: 3.12 TABLET, FILM COATED ORAL at 16:23

## 2018-06-24 RX ADMIN — CARVEDILOL 3.12 MG: 3.12 TABLET, FILM COATED ORAL at 08:28

## 2018-06-24 RX ADMIN — MEMANTINE 5 MG: 10 TABLET ORAL at 08:28

## 2018-06-24 RX ADMIN — ZOLPIDEM TARTRATE 5 MG: 5 TABLET ORAL at 21:23

## 2018-06-24 RX ADMIN — MEMANTINE 5 MG: 10 TABLET ORAL at 16:23

## 2018-06-24 NOTE — BH NOTES
PSYCHIATRIC PROGRESS NOTE         Patient Name  Fredy Lima   Date of Birth 1940   [facility] [id number]   Medical Record Number  [id number]      Age  68 y.o. PCP Berenice Spears, MD   Admit date:  6/17/2018    Room Number  321/01  @ [facility]   Date of Service  6/24/2018           E & M PROGRESS NOTE:         HISTORY       CC:  \"confused, memory impairment\"  HISTORY OF PRESENT ILLNESS/INTERVAL HISTORY:  (reviewed/updated 6/24/2018). per initial evaluation: The patient, Fredy Lima, is a 68 y.o. WHITE [place]  female with a past psychiatric history significant for dementia, who presents at this time with complaints of (and/or evidence of) the following emotional symptoms: memory impairment. Additional symptomatology include disoriented to time and place, poor memory and behavior issues. pt has been incontent and need assistance with ADLs. She is irritable and easily gets loud and labile in mood. She was dc to medical unit for tx of hypotension and now back on U for further tx  The above symptoms have been present for years. These symptoms are of severe severity. These symptoms are constant  in nature. Fredy Lima presents/reports/evidences the following emotional symptoms today, 6/24/2018:memory impairment. The above symptoms have been present for years. These symptoms are of moderate severity. The symptoms are constant  in nature. Additional symptomatology and features include confused , disoriented to place and time. Pt is now accepting her med and need assistance with ADLs due to incontinence. No aggressive behavior and affect is bright , sleep is improving, pleasant and co operative. Received no prn. SIDE EFFECTS: (reviewed/updated 6/24/2018)  None reported or admitted to.   No noted toxicity with use of current med   ALLERGIES:(reviewed/updated 6/24/2018)  Allergies   Allergen Reactions    Cephalosporins Itching    Codeine Hives    Erythromycin Hives    Nubain [Nalbuphine] Hives    Pcn [Penicillins] Hives    Percodan [Oxycodone Hcl-Oxycodone-Asa] Hives    Promethazine Hives    Protonix [Pantoprazole] Diarrhea    Sulfa (Sulfonamide Antibiotics) Hives    Toradol [Ketorolac] Hives    Ultram [Tramadol] Hives    Valium [Diazepam] Hives    Prilosec [Omeprazole] Itching      MEDICATIONS PRIOR TO ADMISSION:(reviewed/updated 6/24/2018)  Prescriptions Prior to Admission   Medication Sig    multivit-minerals/folic acid (WOMEN'S MULTIVITAMIN GUMMIES PO) Take 1 Tab by mouth daily.  QUEtiapine (SEROQUEL) 25 mg tablet Take 12.5 mg by mouth daily.  carvedilol (COREG) 3.125 mg tablet Take 3.125 mg by mouth two (2) times daily (with meals).  diclofenac (VOLTAREN) 1 % gel Apply 2 g to affected area every six (6) hours as needed (arthritis pain).  acetaminophen (TYLENOL) 325 mg tablet Take 650 mg by mouth every eight (8) hours as needed for Pain.  Menthol (ASPERCREME HEAT) 10 % gel by Apply Externally route every eight (8) hours as needed (arthritis).  aspirin 81 mg chewable tablet Take 81 mg by mouth daily. Indications: myocardial infarction prevention    dilTIAZem XR (DILACOR XR) 120 mg XR capsule Take 120 mg by mouth daily. Indications: hypertension, VENTRICULAR RATE CONTROL IN ATRIAL FIBRILLATION    furosemide (LASIX) 20 mg tablet Take 20 mg by mouth daily as needed. Indications: Edema, hypertension    loratadine (CLARITIN) 10 mg tablet Take 10 mg by mouth daily as needed. Indications: Allergic Rhinitis, SNEEZING    famotidine (PEPCID) 20 mg tablet Take 20 mg by mouth daily. Indications: Heartburn    QUEtiapine (SEROQUEL) 25 mg tablet Take 25 mg by mouth nightly. Indications: Generalized Anxiety Disorder      PAST MEDICAL HISTORY: Past medical history from the initial psychiatric evaluation has been reviewed (reviewed/updated 6/24/2018) with no additional updates (I asked patient and no additional past medical history provided).  No past medical history on file.No past surgical history on file. SOCIAL HISTORY: Social history from the initial psychiatric evaluation has been reviewed (reviewed/updated 6/24/2018) with no additional updates (I asked patient and no additional social history provided). Social History     Social History    Marital status: SINGLE     Spouse name: N/A    Number of children: N/A    Years of education: N/A     Occupational History    Not on file. Social History Main Topics    Smoking status: Not on file    Smokeless tobacco: Not on file    Alcohol use Not on file    Drug use: Not on file    Sexual activity: Not on file     Other Topics Concern    Not on file     Social History Narrative      FAMILY HISTORY: Family history from the initial psychiatric evaluation has been reviewed (reviewed/updated 6/24/2018) with no additional updates (I asked patient and no additional family history provided). No family history on file. REVIEW OF SYSTEMS: (reviewed/updated 6/24/2018)  Appetite:poor   Sleep: decreased more than normal   All other Review of Systems: Psychological ROS: positive for - behavioral disorder, concentration difficulties, disorientation and memory difficulties         [address] [facility] ([facility]):    [facility] FINDINGS ARE WITHIN NORMAL LIMITS (WNL) UNLESS OTHERWISE STATED BELOW. ( ALL OF THE BELOW CATEGORIES OF THE [facility] HAVE BEEN REVIEWED (reviewed 6/24/2018) AND UPDATED AS DEEMED APPROPRIATE )  General Presentation age appropriate and casually dressed, unreliable and vague   Orientation disorganized   Vital Signs  See below (reviewed 6/24/2018); Vital Signs (BP, Pulse, & Temp) are within normal limits if not listed below.    Gait and Station Stable/steady, no ataxia   Musculoskeletal System No extrapyramidal symptoms (EPS); no abnormal muscular movements or Tardive Dyskinesia (TD); muscle strength and tone are within normal limits   Language No aphasia or dysarthria   Speech:  hypoverbal Thought Processes illogical; slow rate of thoughts; poor abstract reasoning/computation   Thought Associations tangential   Thought Content free of delusions, free of hallucinations and preoccupations   Suicidal Ideations none   Homicidal Ideations none   Mood:  Less anxious   Affect:  anxious and increased in intensity   Memory recent  impaired   Memory remote:  impaired   Concentration/Attention:  distractable   Fund of Knowledge below average   Insight:  poor   Reliability poor   Judgment:  poor          VITALS:     Patient Vitals for the past 24 hrs:   Temp Pulse Resp BP   06/24/18 1623 - (!) 58 - 157/57   06/24/18 0828 - 62 - 119/62   06/24/18 0800 - 62 - 119/62   06/24/18 0615 98 °F (36.7 °C) (!) 56 16 125/65   06/23/18 2015 - 72 16 119/81     Wt Readings from Last 3 Encounters:   06/18/18 54.6 kg (120 lb 4.8 oz)     Temp Readings from Last 3 Encounters:   06/24/18 98 °F (36.7 °C)   06/17/18 97.6 °F (36.4 °C)   06/15/18 97.6 °F (36.4 °C)     BP Readings from Last 3 Encounters:   06/24/18 157/57   06/17/18 102/47   06/16/18 99/55     Pulse Readings from Last 3 Encounters:   06/24/18 (!) 58   06/17/18 70   06/16/18 81            DATA     LABORATORY DATA:(reviewed/updated 6/24/2018)  No results found for this or any previous visit (from the past 24 hour(s)). No results found for: VALF2, VALAC, VALP, VALPR, DS6, CRBAM, CRBAMP, CARB2, XCRBAM  No results found for: LITHM   RADIOLOGY REPORTS:(reviewed/updated 6/24/2018)  Xr Chest Port    Result Date: 6/17/2018  EXAM:  XR CHEST PORT INDICATION:  Chest Pain COMPARISON:  None. FINDINGS: A portable AP radiograph of the chest was obtained at 720 hours. The patient is on a cardiac monitor. There is minor right basilar atelectasis. There is question of a vague right perihilar airspace process. There is slight diffuse interstitial prominence. Heart size is borderline enlarged. Patient status post median sternotomy. IMPRESSION: 1.  There is suggestion of a vague right perihilar airspace process and follow-up studies would be helpful to assess for interval change. There is minor right basilar atelectasis. MEDICATIONS     ALL MEDICATIONS:   Current Facility-Administered Medications   Medication Dose Route Frequency    memantine (NAMENDA) tablet 5 mg  5 mg Oral BID    dilTIAZem ER (CARDIZEM LA) tablet 120 mg  120 mg Oral DAILY    OLANZapine (ZyPREXA) tablet 2.5 mg  2.5 mg Oral Q6H PRN    ziprasidone (GEODON) 10 mg in sterile water (preservative free) 0.5 mL injection  10 mg IntraMUSCular BID PRN    benztropine (COGENTIN) tablet 1 mg  1 mg Oral BID PRN    benztropine (COGENTIN) injection 1 mg  1 mg IntraMUSCular BID PRN    zolpidem (AMBIEN) tablet 5 mg  5 mg Oral QHS PRN    acetaminophen (TYLENOL) tablet 650 mg  650 mg Oral Q4H PRN    magnesium hydroxide (MILK OF MAGNESIA) 400 mg/5 mL oral suspension 30 mL  30 mL Oral DAILY PRN    nicotine (NICODERM CQ) 21 mg/24 hr patch 1 Patch  1 Patch TransDERmal DAILY PRN    aspirin chewable tablet 81 mg  81 mg Oral DAILY    famotidine (PEPCID) tablet 20 mg  20 mg Oral DAILY    carvedilol (COREG) tablet 3.125 mg  3.125 mg Oral BID WITH MEALS      SCHEDULED MEDICATIONS:   Current Facility-Administered Medications   Medication Dose Route Frequency    memantine (NAMENDA) tablet 5 mg  5 mg Oral BID    dilTIAZem ER (CARDIZEM LA) tablet 120 mg  120 mg Oral DAILY    aspirin chewable tablet 81 mg  81 mg Oral DAILY    famotidine (PEPCID) tablet 20 mg  20 mg Oral DAILY    carvedilol (COREG) tablet 3.125 mg  3.125 mg Oral BID WITH MEALS          ASSESSMENT & PLAN     DIAGNOSES REQUIRING ACTIVE TREATMENT AND MONITORING: (reviewed/updated 6/24/2018)  Patient Active Hospital Problem List:   Dementia (6/17/2018)    Assessment: moderate to severe- disoriented, poor memory and irritable. Need total care. No behavior problem and accepting med    Plan:  [facility] - optimize dose- 5 mg bid.  Pt has lost her [facility]- will need placement  06/24/18: Continue current treatement  Hypotension  A: recent dc from medical unit- BP is improving  P: ct to monitor by IM- cautious with AP- dc Seroquel        In summary, Anastasia Pillai, is a 68 y.o.  female who presents with a severe exacerbation of the principal diagnosis of Dementia  Patient's condition is worsening/not improving/not stable . Patient requires continued inpatient hospitalization for further stabilization, safety monitoring and medication management. I will continue to coordinate the provision of individual, milieu, occupational, group, and substance abuse therapies to address target symptoms/diagnoses as deemed appropriate for the individual patient. A coordinated, multidisplinary treatment team round was conducted with the patient (this team consists of the nurse, psychiatric unit pharmcist,  and writer). Complete current electronic health record for patient has been reviewed today including consultant notes, ancillary staff notes, nurses and psychiatric tech notes. Suicide risk assessment completed and patient deemed to be of low risk for suicide at this time. The following regarding medications was addressed during rounds with patient:   the risks and benefits of the proposed medication. The patient was given the opportunity to ask questions. Informed consent given to the use of the above medications. Will continue to adjust psychiatric and non-psychiatric medications (see above \"medication\" section and orders section for details) as deemed appropriate & based upon diagnoses and response to treatment. I will continue to order blood tests/labs and diagnostic tests as deemed appropriate and review results as they become available (see orders for details and above listed lab/test results). I will order psychiatric records from previous [facility] hospitals to further elucidate the nature of patient's psychopathology and review once available.     I will gather additional collateral information from friends, family and o/p treatment team to further elucidate the nature of patient's psychopathology and baselline level of psychiatric functioning. I certify that this patient's inpatient psychiatric hospital services furnished since the previous certification were, and continue to be, required for treatment that could reasonably be expected to improve the patient's condition, or for diagnostic study, and that the patient continues to need, on a daily basis, active treatment furnished directly by or requiring the supervision of inpatient psychiatric facility personnel. In addition, the hospital records show that services furnished were intensive treatment services, admission or related services, or equivalent services.     EXPECTED DISCHARGE DATE/DAY: TBD     DISPOSITION: Home/[facility]       Signed By:   Gisel oMlina MD  6/24/2018

## 2018-06-24 NOTE — PROGRESS NOTES
Problem: Dementia-BEHAVIORAL HEALTH (Adult/Pediatric)  Goal: *STG: Remains safe in hospital  Outcome: Progressing Towards Goal  Pt slept 5 hours. Pt had uneventful night and required no PRN's. Pt had no complaints or signs of distress throughout night. Respirations were unlabored. Continuing to monitor with q15 min rounds for safety.

## 2018-06-24 NOTE — PROGRESS NOTES
Problem: Dementia-BEHAVIORAL HEALTH (Adult/Pediatric)  Goal: *STG: Remains safe in hospital  Outcome: Progressing Towards Goal  Pt is alert/oriented x4. Calm and Cooperative. Appropriate and visible in the milieu. Attending groups. Mood is good. Meds/meal compliant. No PRNs given this shift. No behavioral issues. Denies suicidal and homicidal ideations. Denies auditory and visual hallucinations. Will continue to monitor pt with q15 min rounds for safety.

## 2018-06-24 NOTE — BH NOTES
PSYCHIATRIC PROGRESS NOTE         Patient Name  Keeley Vee   Date of Birth 1940   [facility] [id number]   Medical Record Number  [id number]      Age  68 y.o. PCP Berenice Spears, MD   Admit date:  6/17/2018    Room Number  321/01  @ [facility]   Date of Service  6/23/2018           E & M PROGRESS NOTE:         HISTORY       CC:  \"confused, memory impairment\"  HISTORY OF PRESENT ILLNESS/INTERVAL HISTORY:  (reviewed/updated 6/23/2018). per initial evaluation: The patient, Keeley Vee, is a 68 y.o. WHITE [place]  female with a past psychiatric history significant for dementia, who presents at this time with complaints of (and/or evidence of) the following emotional symptoms: memory impairment. Additional symptomatology include disoriented to time and place, poor memory and behavior issues. pt has been incontent and need assistance with ADLs. She is irritable and easily gets loud and labile in mood. She was dc to medical unit for tx of hypotension and now back on U for further tx  The above symptoms have been present for years. These symptoms are of severe severity. These symptoms are constant  in nature. Keeley Vee presents/reports/evidences the following emotional symptoms today, 6/23/2018:memory impairment. The above symptoms have been present for years. These symptoms are of moderate severity. The symptoms are constant  in nature. Additional symptomatology and features include confused , disoriented to place and time. Pt is now accepting her med and need assistance with ADLs due to incontinence. No aggressive behavior and affect is better, sleep is improving. Pt tend to stay in bed after breakfast but pleasant and co operative      SIDE EFFECTS: (reviewed/updated 6/23/2018)  None reported or admitted to.   No noted toxicity with use of current med   ALLERGIES:(reviewed/updated 6/23/2018)  Allergies   Allergen Reactions    Cephalosporins Itching    Codeine Hives    Erythromycin Hives    Nubain [Nalbuphine] Hives    Pcn [Penicillins] Hives    Percodan [Oxycodone Hcl-Oxycodone-Asa] Hives    Promethazine Hives    Protonix [Pantoprazole] Diarrhea    Sulfa (Sulfonamide Antibiotics) Hives    Toradol [Ketorolac] Hives    Ultram [Tramadol] Hives    Valium [Diazepam] Hives    Prilosec [Omeprazole] Itching      MEDICATIONS PRIOR TO ADMISSION:(reviewed/updated 6/23/2018)  Prescriptions Prior to Admission   Medication Sig    multivit-minerals/folic acid (WOMEN'S MULTIVITAMIN GUMMIES PO) Take 1 Tab by mouth daily.  QUEtiapine (SEROQUEL) 25 mg tablet Take 12.5 mg by mouth daily.  carvedilol (COREG) 3.125 mg tablet Take 3.125 mg by mouth two (2) times daily (with meals).  diclofenac (VOLTAREN) 1 % gel Apply 2 g to affected area every six (6) hours as needed (arthritis pain).  acetaminophen (TYLENOL) 325 mg tablet Take 650 mg by mouth every eight (8) hours as needed for Pain.  Menthol (ASPERCREME HEAT) 10 % gel by Apply Externally route every eight (8) hours as needed (arthritis).  aspirin 81 mg chewable tablet Take 81 mg by mouth daily. Indications: myocardial infarction prevention    dilTIAZem XR (DILACOR XR) 120 mg XR capsule Take 120 mg by mouth daily. Indications: hypertension, VENTRICULAR RATE CONTROL IN ATRIAL FIBRILLATION    furosemide (LASIX) 20 mg tablet Take 20 mg by mouth daily as needed. Indications: Edema, hypertension    loratadine (CLARITIN) 10 mg tablet Take 10 mg by mouth daily as needed. Indications: Allergic Rhinitis, SNEEZING    famotidine (PEPCID) 20 mg tablet Take 20 mg by mouth daily. Indications: Heartburn    QUEtiapine (SEROQUEL) 25 mg tablet Take 25 mg by mouth nightly.  Indications: Generalized Anxiety Disorder      PAST MEDICAL HISTORY: Past medical history from the initial psychiatric evaluation has been reviewed (reviewed/updated 6/23/2018) with no additional updates (I asked patient and no additional past medical history provided). No past medical history on file. No past surgical history on file. SOCIAL HISTORY: Social history from the initial psychiatric evaluation has been reviewed (reviewed/updated 6/23/2018) with no additional updates (I asked patient and no additional social history provided). Social History     Social History    Marital status: SINGLE     Spouse name: N/A    Number of children: N/A    Years of education: N/A     Occupational History    Not on file. Social History Main Topics    Smoking status: Not on file    Smokeless tobacco: Not on file    Alcohol use Not on file    Drug use: Not on file    Sexual activity: Not on file     Other Topics Concern    Not on file     Social History Narrative      FAMILY HISTORY: Family history from the initial psychiatric evaluation has been reviewed (reviewed/updated 6/23/2018) with no additional updates (I asked patient and no additional family history provided). No family history on file. REVIEW OF SYSTEMS: (reviewed/updated 6/23/2018)  Appetite:poor   Sleep: decreased more than normal   All other Review of Systems: Psychological ROS: positive for - behavioral disorder, concentration difficulties, disorientation and memory difficulties         [facility] ([facility]):    [facility] FINDINGS ARE WITHIN NORMAL LIMITS (WNL) UNLESS OTHERWISE STATED BELOW. ( ALL OF THE BELOW CATEGORIES OF THE [facility] HAVE BEEN REVIEWED (reviewed 6/23/2018) AND UPDATED AS DEEMED APPROPRIATE )  General Presentation age appropriate and casually dressed, unreliable and vague   Orientation disorganized   Vital Signs  See below (reviewed 6/23/2018); Vital Signs (BP, Pulse, & Temp) are within normal limits if not listed below.    Gait and Station Stable/steady, no ataxia   Musculoskeletal System No extrapyramidal symptoms (EPS); no abnormal muscular movements or Tardive Dyskinesia (TD); muscle strength and tone are within normal limits   Language No aphasia or dysarthria   Speech:  hypoverbal   Thought Processes illogical; slow rate of thoughts; poor abstract reasoning/computation   Thought Associations tangential   Thought Content free of delusions, free of hallucinations and preoccupations   Suicidal Ideations none   Homicidal Ideations none   Mood:  Less anxious   Affect:  anxious and increased in intensity   Memory recent  impaired   Memory remote:  impaired   Concentration/Attention:  distractable   Fund of Knowledge below average   Insight:  poor   Reliability poor   Judgment:  poor          VITALS:     Patient Vitals for the past 24 hrs:   Temp Pulse Resp BP   06/23/18 2015 - 72 16 119/81   06/23/18 1615 - 87 18 132/87   06/23/18 0819 97.8 °F (36.6 °C) 67 - 119/50   06/23/18 0813 - 60 - 119/50     Wt Readings from Last 3 Encounters:   06/18/18 54.6 kg (120 lb 4.8 oz)     Temp Readings from Last 3 Encounters:   06/23/18 97.8 °F (36.6 °C)   06/17/18 97.6 °F (36.4 °C)   06/15/18 97.6 °F (36.4 °C)     BP Readings from Last 3 Encounters:   06/23/18 119/81   06/17/18 102/47   06/16/18 99/55     Pulse Readings from Last 3 Encounters:   06/23/18 72   06/17/18 70   06/16/18 81            DATA     LABORATORY DATA:(reviewed/updated 6/23/2018)  No results found for this or any previous visit (from the past 24 hour(s)). No results found for: VALF2, VALAC, VALP, VALPR, DS6, CRBAM, CRBAMP, CARB2, XCRBAM  No results found for: LITHM   RADIOLOGY REPORTS:(reviewed/updated 6/23/2018)  Xr Chest Port    Result Date: 6/17/2018  EXAM:  XR CHEST PORT INDICATION:  Chest Pain COMPARISON:  None. FINDINGS: A portable AP radiograph of the chest was obtained at 720 hours. The patient is on a cardiac monitor. There is minor right basilar atelectasis. There is question of a vague right perihilar airspace process. There is slight diffuse interstitial prominence. Heart size is borderline enlarged. Patient status post median sternotomy. IMPRESSION: 1.  There is suggestion of a vague right perihilar airspace process and follow-up studies would be helpful to assess for interval change. There is minor right basilar atelectasis. MEDICATIONS     ALL MEDICATIONS:   Current Facility-Administered Medications   Medication Dose Route Frequency    memantine (NAMENDA) tablet 5 mg  5 mg Oral BID    dilTIAZem ER (CARDIZEM LA) tablet 120 mg  120 mg Oral DAILY    OLANZapine (ZyPREXA) tablet 2.5 mg  2.5 mg Oral Q6H PRN    ziprasidone (GEODON) 10 mg in sterile water (preservative free) 0.5 mL injection  10 mg IntraMUSCular BID PRN    benztropine (COGENTIN) tablet 1 mg  1 mg Oral BID PRN    benztropine (COGENTIN) injection 1 mg  1 mg IntraMUSCular BID PRN    zolpidem (AMBIEN) tablet 5 mg  5 mg Oral QHS PRN    acetaminophen (TYLENOL) tablet 650 mg  650 mg Oral Q4H PRN    magnesium hydroxide (MILK OF MAGNESIA) 400 mg/5 mL oral suspension 30 mL  30 mL Oral DAILY PRN    nicotine (NICODERM CQ) 21 mg/24 hr patch 1 Patch  1 Patch TransDERmal DAILY PRN    aspirin chewable tablet 81 mg  81 mg Oral DAILY    famotidine (PEPCID) tablet 20 mg  20 mg Oral DAILY    carvedilol (COREG) tablet 3.125 mg  3.125 mg Oral BID WITH MEALS      SCHEDULED MEDICATIONS:   Current Facility-Administered Medications   Medication Dose Route Frequency    memantine (NAMENDA) tablet 5 mg  5 mg Oral BID    dilTIAZem ER (CARDIZEM LA) tablet 120 mg  120 mg Oral DAILY    aspirin chewable tablet 81 mg  81 mg Oral DAILY    famotidine (PEPCID) tablet 20 mg  20 mg Oral DAILY    carvedilol (COREG) tablet 3.125 mg  3.125 mg Oral BID WITH MEALS          ASSESSMENT & PLAN     DIAGNOSES REQUIRING ACTIVE TREATMENT AND MONITORING: (reviewed/updated 6/23/2018)  Patient Active Hospital Problem List:   Dementia (6/17/2018)    Assessment: moderate to severe- disoriented, poor memory and irritable. Need total care. No behavior problem and accepting med    Plan:  Nisha Terry. - optimize dose- 5 mg bid.  Pt has lost her detention- will need placement   Hypotension  A: recent dc from medical unit- BP is improving  P: ct to monitor by IM- ga with AP- dc Seroquel        In summary, Anastasia Pillai, is a 68 y.o.  female who presents with a severe exacerbation of the principal diagnosis of Dementia  Patient's condition is worsening/not improving/not stable . Patient requires continued inpatient hospitalization for further stabilization, safety monitoring and medication management. I will continue to coordinate the provision of individual, milieu, occupational, group, and substance abuse therapies to address target symptoms/diagnoses as deemed appropriate for the individual patient. A coordinated, multidisplinary treatment team round was conducted with the patient (this team consists of the nurse, psychiatric unit pharmcist,  and writer). Complete current electronic health record for patient has been reviewed today including consultant notes, ancillary staff notes, nurses and psychiatric tech notes. Suicide risk assessment completed and patient deemed to be of low risk for suicide at this time. The following regarding medications was addressed during rounds with patient:   the risks and benefits of the proposed medication. The patient was given the opportunity to ask questions. Informed consent given to the use of the above medications. Will continue to adjust psychiatric and non-psychiatric medications (see above \"medication\" section and orders section for details) as deemed appropriate & based upon diagnoses and response to treatment. I will continue to order blood tests/labs and diagnostic tests as deemed appropriate and review results as they become available (see orders for details and above listed lab/test results). I will order psychiatric records from previous [facility] hospitals to further elucidate the nature of patient's psychopathology and review once available.     I will gather additional collateral information from friends, family and o/p treatment team to further elucidate the nature of patient's psychopathology and baselline level of psychiatric functioning. I certify that this patient's inpatient psychiatric hospital services furnished since the previous certification were, and continue to be, required for treatment that could reasonably be expected to improve the patient's condition, or for diagnostic study, and that the patient continues to need, on a daily basis, active treatment furnished directly by or requiring the supervision of inpatient psychiatric facility personnel. In addition, the hospital records show that services furnished were intensive treatment services, admission or related services, or equivalent services.     EXPECTED DISCHARGE DATE/DAY: TBD     DISPOSITION: Home/USP       Signed By:   Gisel Molina MD  6/23/2018

## 2018-06-24 NOTE — BH NOTES
Problem: Altered Thought Process (Adult/Pediatric)  Goal: *STG: Remains safe in hospital  Outcome: Progressing Towards Goal  Pt is more active in the milieu this morning. Pt presents as alert, quiet and cooperative but disoriented x 3 and in total confusion due to AL/dementia. Pt is meds/meals compliant. Pt has shown no behavior problems in the unit except her dementia confusions. Pt was present in her treatment team meeting today.  Will continue to monitor q 15 for safety, mood and behavior changes.

## 2018-06-25 PROCEDURE — 65220000003 HC RM SEMIPRIVATE PSYCH

## 2018-06-25 PROCEDURE — 74011250637 HC RX REV CODE- 250/637: Performed by: PSYCHIATRY & NEUROLOGY

## 2018-06-25 PROCEDURE — 74011250637 HC RX REV CODE- 250/637: Performed by: INTERNAL MEDICINE

## 2018-06-25 RX ADMIN — MEMANTINE 5 MG: 10 TABLET ORAL at 18:13

## 2018-06-25 RX ADMIN — CARVEDILOL 3.12 MG: 3.12 TABLET, FILM COATED ORAL at 18:13

## 2018-06-25 RX ADMIN — FAMOTIDINE 20 MG: 20 TABLET ORAL at 09:43

## 2018-06-25 RX ADMIN — MEMANTINE 5 MG: 10 TABLET ORAL at 09:43

## 2018-06-25 RX ADMIN — ASPIRIN 81 MG 81 MG: 81 TABLET ORAL at 09:43

## 2018-06-25 NOTE — BH NOTES
Patient has been confused and wandering, requiring frequent redirection. Patient is alert only to self. Patient is eating meals with prompting. No aggression noted. Continues to take her bear in arms.

## 2018-06-25 NOTE — PROGRESS NOTES
Diet as tolerated. Pt noted to be meal compliant. Hx notable for dementia and hypotension.   Ht: 5'1.25\"  Wt: 120 lb 4.8 oz  BMI: 22.55 kg/(m^2) c/w normal weight  Est energy needs: 1555 kcal, 60 g protein, 1 mL/kcal fluids  Pt will consume > 75% of meals at follow up

## 2018-06-25 NOTE — BH NOTES
PSYCHIATRIC PROGRESS NOTE         Patient Name  Pedro Schmid   Date of Birth 1940   [facility] [id number]   Medical Record Number  [id number]      Age  68 y.o. PCP Berenice Spears, MD   Admit date:  6/17/2018    Room Number  321/01  @ [facility]   Date of Service  6/25/2018           E & M PROGRESS NOTE:         HISTORY       CC:  \"confused, memory impairment\"  HISTORY OF PRESENT ILLNESS/INTERVAL HISTORY:  (reviewed/updated 6/25/2018). per initial evaluation: The patient, Pedro Schmid, is a 68 y.o. WHITE [place]  female with a past psychiatric history significant for dementia, who presents at this time with complaints of (and/or evidence of) the following emotional symptoms: memory impairment. Additional symptomatology include disoriented to time and place, poor memory and behavior issues. pt has been incontent and need assistance with ADLs. She is irritable and easily gets loud and labile in mood. She was dc to medical unit for tx of hypotension and now back on U for further tx  The above symptoms have been present for years. These symptoms are of severe severity. These symptoms are constant  in nature. Pedro Schmid presents/reports/evidences the following emotional symptoms today, 6/25/2018:memory impairment. The above symptoms have been present for years. These symptoms are of moderate severity. The symptoms are constant  in nature. Additional symptomatology and features include confused , disoriented to place and time. Pt is now accepting her med and need assistance with ADLs due to incontinence. No aggressive behavior and affect is bright , sleep is improving, pleasant and co operative. Received no prn.  6/25/18 she is compliant with medications and no anger issues like before and she gets confused and disoriented       SIDE EFFECTS: (reviewed/updated 6/25/2018)  None reported or admitted to.   No noted toxicity with use of current med   ALLERGIES:(reviewed/updated 6/25/2018)  Allergies   Allergen Reactions    Cephalosporins Itching    Codeine Hives    Erythromycin Hives    Nubain [Nalbuphine] Hives    Pcn [Penicillins] Hives    Percodan [Oxycodone Hcl-Oxycodone-Asa] Hives    Promethazine Hives    Protonix [Pantoprazole] Diarrhea    Sulfa (Sulfonamide Antibiotics) Hives    Toradol [Ketorolac] Hives    Ultram [Tramadol] Hives    Valium [Diazepam] Hives    Prilosec [Omeprazole] Itching      MEDICATIONS PRIOR TO ADMISSION:(reviewed/updated 6/25/2018)  Prescriptions Prior to Admission   Medication Sig    multivit-minerals/folic acid (WOMEN'S MULTIVITAMIN GUMMIES PO) Take 1 Tab by mouth daily.  QUEtiapine (SEROQUEL) 25 mg tablet Take 12.5 mg by mouth daily.  carvedilol (COREG) 3.125 mg tablet Take 3.125 mg by mouth two (2) times daily (with meals).  diclofenac (VOLTAREN) 1 % gel Apply 2 g to affected area every six (6) hours as needed (arthritis pain).  acetaminophen (TYLENOL) 325 mg tablet Take 650 mg by mouth every eight (8) hours as needed for Pain.  Menthol (ASPERCREME HEAT) 10 % gel by Apply Externally route every eight (8) hours as needed (arthritis).  aspirin 81 mg chewable tablet Take 81 mg by mouth daily. Indications: myocardial infarction prevention    dilTIAZem XR (DILACOR XR) 120 mg XR capsule Take 120 mg by mouth daily. Indications: hypertension, VENTRICULAR RATE CONTROL IN ATRIAL FIBRILLATION    furosemide (LASIX) 20 mg tablet Take 20 mg by mouth daily as needed. Indications: Edema, hypertension    loratadine (CLARITIN) 10 mg tablet Take 10 mg by mouth daily as needed. Indications: Allergic Rhinitis, SNEEZING    famotidine (PEPCID) 20 mg tablet Take 20 mg by mouth daily. Indications: Heartburn    QUEtiapine (SEROQUEL) 25 mg tablet Take 25 mg by mouth nightly.  Indications: Generalized Anxiety Disorder      PAST MEDICAL HISTORY: Past medical history from the initial psychiatric evaluation has been reviewed (reviewed/updated 6/25/2018) with no additional updates (I asked patient and no additional past medical history provided). No past medical history on file. No past surgical history on file. SOCIAL HISTORY: Social history from the initial psychiatric evaluation has been reviewed (reviewed/updated 6/25/2018) with no additional updates (I asked patient and no additional social history provided). Social History     Social History    Marital status: SINGLE     Spouse name: N/A    Number of children: N/A    Years of education: N/A     Occupational History    Not on file. Social History Main Topics    Smoking status: Not on file    Smokeless tobacco: Not on file    Alcohol use Not on file    Drug use: Not on file    Sexual activity: Not on file     Other Topics Concern    Not on file     Social History Narrative      FAMILY HISTORY: Family history from the initial psychiatric evaluation has been reviewed (reviewed/updated 6/25/2018) with no additional updates (I asked patient and no additional family history provided). No family history on file. REVIEW OF SYSTEMS: (reviewed/updated 6/25/2018)  Appetite:poor   Sleep: decreased more than normal   All other Review of Systems: Psychological ROS: positive for - behavioral disorder, concentration difficulties, disorientation and memory difficulties         [facility] (MSE):    MSE FINDINGS ARE WITHIN NORMAL LIMITS (WNL) UNLESS OTHERWISE STATED BELOW. ( ALL OF THE BELOW CATEGORIES OF THE MSE HAVE BEEN REVIEWED (reviewed 6/25/2018) AND UPDATED AS DEEMED APPROPRIATE )  General Presentation age appropriate and casually dressed, unreliable and vague   Orientation disorganized   Vital Signs  See below (reviewed 6/25/2018); Vital Signs (BP, Pulse, & Temp) are within normal limits if not listed below.    Gait and Station Stable/steady, no ataxia   Musculoskeletal System No extrapyramidal symptoms (EPS); no abnormal muscular movements or Tardive Dyskinesia (TD); muscle strength and tone are within normal limits   Language No aphasia or dysarthria   Speech:  hypoverbal   Thought Processes illogical; slow rate of thoughts; poor abstract reasoning/computation   Thought Associations tangential   Thought Content free of delusions, free of hallucinations and preoccupations   Suicidal Ideations none   Homicidal Ideations none   Mood:  Less anxious   Affect:  anxious and increased in intensity   Memory recent  impaired   Memory remote:  impaired   Concentration/Attention:  distractable   Fund of Knowledge below average   Insight:  poor   Reliability poor   Judgment:  poor          VITALS:     Patient Vitals for the past 24 hrs:   Temp Pulse Resp BP SpO2   06/25/18 0652 - 63 - 120/56 -   06/25/18 0628 - 64 16 104/45 97 %   06/24/18 1623 - (!) 58 - 157/57 -     Wt Readings from Last 3 Encounters:   06/18/18 54.6 kg (120 lb 4.8 oz)     Temp Readings from Last 3 Encounters:   06/17/18 97.6 °F (36.4 °C)   06/15/18 97.6 °F (36.4 °C)     BP Readings from Last 3 Encounters:   06/25/18 120/56   06/17/18 102/47   06/16/18 99/55     Pulse Readings from Last 3 Encounters:   06/25/18 63   06/17/18 70   06/16/18 81            DATA     LABORATORY DATA:(reviewed/updated 6/25/2018)  No results found for this or any previous visit (from the past 24 hour(s)). No results found for: VALF2, VALAC, VALP, VALPR, DS6, CRBAM, CRBAMP, CARB2, XCRBAM  No results found for: LITHM   RADIOLOGY REPORTS:(reviewed/updated 6/25/2018)  Xr Chest Port    Result Date: 6/17/2018  EXAM:  XR CHEST PORT INDICATION:  Chest Pain COMPARISON:  None. FINDINGS: A portable AP radiograph of the chest was obtained at 720 hours. The patient is on a cardiac monitor. There is minor right basilar atelectasis. There is question of a vague right perihilar airspace process. There is slight diffuse interstitial prominence. Heart size is borderline enlarged. Patient status post median sternotomy. IMPRESSION: 1.  There is suggestion of a vague right perihilar airspace process and follow-up studies would be helpful to assess for interval change. There is minor right basilar atelectasis. MEDICATIONS     ALL MEDICATIONS:   Current Facility-Administered Medications   Medication Dose Route Frequency    memantine (NAMENDA) tablet 5 mg  5 mg Oral BID    dilTIAZem ER (CARDIZEM LA) tablet 120 mg  120 mg Oral DAILY    OLANZapine (ZyPREXA) tablet 2.5 mg  2.5 mg Oral Q6H PRN    ziprasidone (GEODON) 10 mg in sterile water (preservative free) 0.5 mL injection  10 mg IntraMUSCular BID PRN    benztropine (COGENTIN) tablet 1 mg  1 mg Oral BID PRN    benztropine (COGENTIN) injection 1 mg  1 mg IntraMUSCular BID PRN    zolpidem (AMBIEN) tablet 5 mg  5 mg Oral QHS PRN    acetaminophen (TYLENOL) tablet 650 mg  650 mg Oral Q4H PRN    magnesium hydroxide (MILK OF MAGNESIA) 400 mg/5 mL oral suspension 30 mL  30 mL Oral DAILY PRN    nicotine (NICODERM CQ) 21 mg/24 hr patch 1 Patch  1 Patch TransDERmal DAILY PRN    aspirin chewable tablet 81 mg  81 mg Oral DAILY    famotidine (PEPCID) tablet 20 mg  20 mg Oral DAILY    carvedilol (COREG) tablet 3.125 mg  3.125 mg Oral BID WITH MEALS      SCHEDULED MEDICATIONS:   Current Facility-Administered Medications   Medication Dose Route Frequency    memantine (NAMENDA) tablet 5 mg  5 mg Oral BID    dilTIAZem ER (CARDIZEM LA) tablet 120 mg  120 mg Oral DAILY    aspirin chewable tablet 81 mg  81 mg Oral DAILY    famotidine (PEPCID) tablet 20 mg  20 mg Oral DAILY    carvedilol (COREG) tablet 3.125 mg  3.125 mg Oral BID WITH MEALS          ASSESSMENT & PLAN     DIAGNOSES REQUIRING ACTIVE TREATMENT AND MONITORING: (reviewed/updated 6/25/2018)  Patient Active Hospital Problem List:   Dementia (6/17/2018)    Assessment: moderate to severe- disoriented, poor memory and irritable. Need total care. No behavior problem and accepting med    Plan:  Gemma Ferreira. - optimize dose- 5 mg bid.  Pt has lost her nursing home- will need placement  06/24/18: Continue current treatement  Hypotension  A: recent dc from medical unit- BP is improving  P: ct to monitor by IM- cautious with AP- dc Seroquel  6/25/18 continue same medications         In summary, Anastasia Pillai, is a 68 y.o.  female who presents with a severe exacerbation of the principal diagnosis of Dementia  Patient's condition is worsening/not improving/not stable . Patient requires continued inpatient hospitalization for further stabilization, safety monitoring and medication management. I will continue to coordinate the provision of individual, milieu, occupational, group, and substance abuse therapies to address target symptoms/diagnoses as deemed appropriate for the individual patient. A coordinated, multidisplinary treatment team round was conducted with the patient (this team consists of the nurse, psychiatric unit pharmcist,  and writer). Complete current electronic health record for patient has been reviewed today including consultant notes, ancillary staff notes, nurses and psychiatric tech notes. Suicide risk assessment completed and patient deemed to be of low risk for suicide at this time. The following regarding medications was addressed during rounds with patient:   the risks and benefits of the proposed medication. The patient was given the opportunity to ask questions. Informed consent given to the use of the above medications. Will continue to adjust psychiatric and non-psychiatric medications (see above \"medication\" section and orders section for details) as deemed appropriate & based upon diagnoses and response to treatment. I will continue to order blood tests/labs and diagnostic tests as deemed appropriate and review results as they become available (see orders for details and above listed lab/test results).     I will order psychiatric records from previous [facility] to further elucidate the nature of patient's psychopathology and review once available. I will gather additional collateral information from friends, family and o/p treatment team to further elucidate the nature of patient's psychopathology and baselline level of psychiatric functioning. I certify that this patient's inpatient psychiatric hospital services furnished since the previous certification were, and continue to be, required for treatment that could reasonably be expected to improve the patient's condition, or for diagnostic study, and that the patient continues to need, on a daily basis, active treatment furnished directly by or requiring the supervision of inpatient psychiatric facility personnel. In addition, the hospital records show that services furnished were intensive treatment services, admission or related services, or equivalent services.     EXPECTED DISCHARGE DATE/DAY: TBD     DISPOSITION: Home/[facility]       Signed By:   Garett Velásquez MD  6/25/2018

## 2018-06-25 NOTE — BH NOTES
GROUP THERAPY PROGRESS NOTE    The patient Lamin Ricks a 68 y.o. female is participating in Coping Skills Group. Group time: 45 minutes    Personal goal for participation:  To participate in relaxation activity    Goal orientation:  relaxation    Group therapy participation: minimal    Therapeutic interventions reviewed and discussed: favorite ways to relax    Impression of participation:  The patient was present-arrived late-pleasant upon approach    Kate Ruvalcaba  6/25/2018  2:08 PM

## 2018-06-25 NOTE — PROGRESS NOTES
Problem: Dementia-BEHAVIORAL HEALTH (Adult/Pediatric)  Goal: *STG: Remains safe in hospital  Outcome: Progressing Towards Goal  Pt is disoriented. Calm and Cooperative. Appropriate and visible in the milieu. Attending groups. Mood is good. Meds/meal compliant. PRN Ambien given this shift. No behavioral issues. Denies suicidal and homicidal ideations. Denies auditory and visual hallucinations. Will continue to monitor pt with q15 min rounds for safety.

## 2018-06-25 NOTE — PROGRESS NOTES
Problem: Dementia-BEHAVIORAL HEALTH (Adult/Pediatric)  Goal: *STG: Remains safe in hospital  Outcome: Progressing Towards Goal  Pt slept 4 hours. Pt had episode of confusion that lasted a few minutes and she became aggressive when attempting to redirect. Respirations were unlabored. Continuing to monitor with q15 min rounds for safety.

## 2018-06-26 PROCEDURE — 74011250637 HC RX REV CODE- 250/637: Performed by: PSYCHIATRY & NEUROLOGY

## 2018-06-26 PROCEDURE — 74011250637 HC RX REV CODE- 250/637: Performed by: INTERNAL MEDICINE

## 2018-06-26 PROCEDURE — 65220000003 HC RM SEMIPRIVATE PSYCH

## 2018-06-26 RX ADMIN — CARVEDILOL 3.12 MG: 3.12 TABLET, FILM COATED ORAL at 17:47

## 2018-06-26 RX ADMIN — MEMANTINE 5 MG: 10 TABLET ORAL at 17:47

## 2018-06-26 RX ADMIN — FAMOTIDINE 20 MG: 20 TABLET ORAL at 09:40

## 2018-06-26 RX ADMIN — MEMANTINE 5 MG: 10 TABLET ORAL at 09:40

## 2018-06-26 RX ADMIN — ASPIRIN 81 MG 81 MG: 81 TABLET ORAL at 09:40

## 2018-06-26 NOTE — BH NOTES
PSYCHIATRIC PROGRESS NOTE         Patient Name  Maria Isabel Stevenson   Date of Birth 1940   [facility] [id number]   Medical Record Number  [id number]      Age  68 y.o. PCP Berenice Spears, MD   Admit date:  6/17/2018    Room Number  321/01  @ [facility]   Date of Service  6/26/2018           E & M PROGRESS NOTE:         HISTORY       CC:  \"confused, memory impairment\"  HISTORY OF PRESENT ILLNESS/INTERVAL HISTORY:  (reviewed/updated 6/26/2018). per initial evaluation: The patient, Maria Isabel Stevenson, is a 68 y.o. WHITE [place]  female with a past psychiatric history significant for dementia, who presents at this time with complaints of (and/or evidence of) the following emotional symptoms: memory impairment. Additional symptomatology include disoriented to time and place, poor memory and behavior issues. pt has been incontent and need assistance with ADLs. She is irritable and easily gets loud and labile in mood. She was dc to medical unit for tx of hypotension and now back on BHU for further tx  The above symptoms have been present for years. These symptoms are of severe severity. These symptoms are constant  in nature. Maria Isabel Stevenson presents/reports/evidences the following emotional symptoms today, 6/26/2018:memory impairment. The above symptoms have been present for years. These symptoms are of moderate severity. The symptoms are constant  in nature. Additional symptomatology and features include confused , disoriented to place and time. Pt is now accepting her med and need assistance with ADLs due to incontinence. No aggressive behavior and affect is bright , sleep is improving, pleasant and co operative. Received no prn.  6/25/18 she is compliant with medications and no anger issues like before and she gets confused and disoriented   6/26/18 she is doing better and mood is better and no anger issues and no SI or HI and slept well last night       SIDE EFFECTS: (reviewed/updated 6/26/2018)  None reported or admitted to. No noted toxicity with use of current med   ALLERGIES:(reviewed/updated 6/26/2018)  Allergies   Allergen Reactions    Cephalosporins Itching    Codeine Hives    Erythromycin Hives    Nubain [Nalbuphine] Hives    Pcn [Penicillins] Hives    Percodan [Oxycodone Hcl-Oxycodone-Asa] Hives    Promethazine Hives    Protonix [Pantoprazole] Diarrhea    Sulfa (Sulfonamide Antibiotics) Hives    Toradol [Ketorolac] Hives    Ultram [Tramadol] Hives    Valium [Diazepam] Hives    Prilosec [Omeprazole] Itching      MEDICATIONS PRIOR TO ADMISSION:(reviewed/updated 6/26/2018)  Prescriptions Prior to Admission   Medication Sig    multivit-minerals/folic acid (WOMEN'S MULTIVITAMIN GUMMIES PO) Take 1 Tab by mouth daily.  QUEtiapine (SEROQUEL) 25 mg tablet Take 12.5 mg by mouth daily.  carvedilol (COREG) 3.125 mg tablet Take 3.125 mg by mouth two (2) times daily (with meals).  diclofenac (VOLTAREN) 1 % gel Apply 2 g to affected area every six (6) hours as needed (arthritis pain).  acetaminophen (TYLENOL) 325 mg tablet Take 650 mg by mouth every eight (8) hours as needed for Pain.  Menthol (ASPERCREME HEAT) 10 % gel by Apply Externally route every eight (8) hours as needed (arthritis).  aspirin 81 mg chewable tablet Take 81 mg by mouth daily. Indications: myocardial infarction prevention    dilTIAZem XR (DILACOR XR) 120 mg XR capsule Take 120 mg by mouth daily. Indications: hypertension, VENTRICULAR RATE CONTROL IN ATRIAL FIBRILLATION    furosemide (LASIX) 20 mg tablet Take 20 mg by mouth daily as needed. Indications: Edema, hypertension    loratadine (CLARITIN) 10 mg tablet Take 10 mg by mouth daily as needed. Indications: Allergic Rhinitis, SNEEZING    famotidine (PEPCID) 20 mg tablet Take 20 mg by mouth daily. Indications: Heartburn    QUEtiapine (SEROQUEL) 25 mg tablet Take 25 mg by mouth nightly.  Indications: Generalized Anxiety Disorder      PAST MEDICAL HISTORY: Past medical history from the initial psychiatric evaluation has been reviewed (reviewed/updated 6/26/2018) with no additional updates (I asked patient and no additional past medical history provided). No past medical history on file. No past surgical history on file. SOCIAL HISTORY: Social history from the initial psychiatric evaluation has been reviewed (reviewed/updated 6/26/2018) with no additional updates (I asked patient and no additional social history provided). Social History     Social History    Marital status: SINGLE     Spouse name: N/A    Number of children: N/A    Years of education: N/A     Occupational History    Not on file. Social History Main Topics    Smoking status: Not on file    Smokeless tobacco: Not on file    Alcohol use Not on file    Drug use: Not on file    Sexual activity: Not on file     Other Topics Concern    Not on file     Social History Narrative      FAMILY HISTORY: Family history from the initial psychiatric evaluation has been reviewed (reviewed/updated 6/26/2018) with no additional updates (I asked patient and no additional family history provided). No family history on file. REVIEW OF SYSTEMS: (reviewed/updated 6/26/2018)  Appetite:poor   Sleep: decreased more than normal   All other Review of Systems: Psychological ROS: positive for - behavioral disorder, concentration difficulties, disorientation and memory difficulties         [facility] (MSE):    MSE FINDINGS ARE WITHIN NORMAL LIMITS (WNL) UNLESS OTHERWISE STATED BELOW. ( ALL OF THE BELOW CATEGORIES OF THE MSE HAVE BEEN REVIEWED (reviewed 6/26/2018) AND UPDATED AS DEEMED APPROPRIATE )  General Presentation age appropriate and casually dressed, unreliable and vague   Orientation disorganized   Vital Signs  See below (reviewed 6/26/2018); Vital Signs (BP, Pulse, & Temp) are within normal limits if not listed below.    Gait and Station Stable/steady, no ataxia Musculoskeletal System No extrapyramidal symptoms (EPS); no abnormal muscular movements or Tardive Dyskinesia (TD); muscle strength and tone are within normal limits   Language No aphasia or dysarthria   Speech:  hypoverbal   Thought Processes illogical; slow rate of thoughts; poor abstract reasoning/computation   Thought Associations tangential   Thought Content free of delusions, free of hallucinations and preoccupations   Suicidal Ideations none   Homicidal Ideations none   Mood:  Less anxious   Affect:  anxious and increased in intensity   Memory recent  impaired   Memory remote:  impaired   Concentration/Attention:  distractable   Fund of Knowledge below average   Insight:  poor   Reliability poor   Judgment:  poor          VITALS:     Patient Vitals for the past 24 hrs:   Temp Pulse Resp BP   06/26/18 0800 - - - 121/47   06/26/18 0704 - 64 14 121/47   06/25/18 1813 - 67 - 146/68     Wt Readings from Last 3 Encounters:   06/18/18 54.6 kg (120 lb 4.8 oz)     Temp Readings from Last 3 Encounters:   06/17/18 97.6 °F (36.4 °C)   06/15/18 97.6 °F (36.4 °C)     BP Readings from Last 3 Encounters:   06/26/18 121/47   06/17/18 102/47   06/16/18 99/55     Pulse Readings from Last 3 Encounters:   06/26/18 64   06/17/18 70   06/16/18 81            DATA     LABORATORY DATA:(reviewed/updated 6/26/2018)  No results found for this or any previous visit (from the past 24 hour(s)). No results found for: VALF2, VALAC, VALP, VALPR, DS6, CRBAM, CRBAMP, CARB2, XCRBAM  No results found for: LITHM   RADIOLOGY REPORTS:(reviewed/updated 6/26/2018)  Xr Chest Port    Result Date: 6/17/2018  EXAM:  XR CHEST PORT INDICATION:  Chest Pain COMPARISON:  None. FINDINGS: A portable AP radiograph of the chest was obtained at 720 hours. The patient is on a cardiac monitor. There is minor right basilar atelectasis. There is question of a vague right perihilar airspace process. There is slight diffuse interstitial prominence.   Heart size is borderline enlarged. Patient status post median sternotomy. IMPRESSION: 1. There is suggestion of a vague right perihilar airspace process and follow-up studies would be helpful to assess for interval change. There is minor right basilar atelectasis. MEDICATIONS     ALL MEDICATIONS:   Current Facility-Administered Medications   Medication Dose Route Frequency    memantine (NAMENDA) tablet 5 mg  5 mg Oral BID    dilTIAZem ER (CARDIZEM LA) tablet 120 mg  120 mg Oral DAILY    OLANZapine (ZyPREXA) tablet 2.5 mg  2.5 mg Oral Q6H PRN    ziprasidone (GEODON) 10 mg in sterile water (preservative free) 0.5 mL injection  10 mg IntraMUSCular BID PRN    benztropine (COGENTIN) tablet 1 mg  1 mg Oral BID PRN    benztropine (COGENTIN) injection 1 mg  1 mg IntraMUSCular BID PRN    zolpidem (AMBIEN) tablet 5 mg  5 mg Oral QHS PRN    acetaminophen (TYLENOL) tablet 650 mg  650 mg Oral Q4H PRN    magnesium hydroxide (MILK OF MAGNESIA) 400 mg/5 mL oral suspension 30 mL  30 mL Oral DAILY PRN    nicotine (NICODERM CQ) 21 mg/24 hr patch 1 Patch  1 Patch TransDERmal DAILY PRN    aspirin chewable tablet 81 mg  81 mg Oral DAILY    famotidine (PEPCID) tablet 20 mg  20 mg Oral DAILY    carvedilol (COREG) tablet 3.125 mg  3.125 mg Oral BID WITH MEALS      SCHEDULED MEDICATIONS:   Current Facility-Administered Medications   Medication Dose Route Frequency    memantine (NAMENDA) tablet 5 mg  5 mg Oral BID    dilTIAZem ER (CARDIZEM LA) tablet 120 mg  120 mg Oral DAILY    aspirin chewable tablet 81 mg  81 mg Oral DAILY    famotidine (PEPCID) tablet 20 mg  20 mg Oral DAILY    carvedilol (COREG) tablet 3.125 mg  3.125 mg Oral BID WITH MEALS          ASSESSMENT & PLAN     DIAGNOSES REQUIRING ACTIVE TREATMENT AND MONITORING: (reviewed/updated 6/26/2018)  Patient Active Hospital Problem List:   Dementia (6/17/2018)    Assessment: moderate to severe- disoriented, poor memory and irritable. Need total care.  No behavior problem and accepting med    Plan:  Dorota Judith. - optimize dose- 5 mg bid. Pt has lost her residential- will need placement  06/24/18: Continue current treatement  Hypotension  A: recent dc from medical unit- BP is improving  P: ct to monitor by IM- cautious with AP- dc Seroquel  6/25/18 continue same medications   6/26/18 continue same medications         In summary, Victoriano Nix, is a 68 y.o.  female who presents with a severe exacerbation of the principal diagnosis of Dementia  Patient's condition is worsening/not improving/not stable . Patient requires continued inpatient hospitalization for further stabilization, safety monitoring and medication management. I will continue to coordinate the provision of individual, milieu, occupational, group, and substance abuse therapies to address target symptoms/diagnoses as deemed appropriate for the individual patient. A coordinated, multidisplinary treatment team round was conducted with the patient (this team consists of the nurse, psychiatric unit pharmcist,  and writer). Complete current electronic health record for patient has been reviewed today including consultant notes, ancillary staff notes, nurses and psychiatric tech notes. Suicide risk assessment completed and patient deemed to be of low risk for suicide at this time. The following regarding medications was addressed during rounds with patient:   the risks and benefits of the proposed medication. The patient was given the opportunity to ask questions. Informed consent given to the use of the above medications. Will continue to adjust psychiatric and non-psychiatric medications (see above \"medication\" section and orders section for details) as deemed appropriate & based upon diagnoses and response to treatment.      I will continue to order blood tests/labs and diagnostic tests as deemed appropriate and review results as they become available (see orders for details and above listed lab/test results). I will order psychiatric records from previous [facility] hospitals to further elucidate the nature of patient's psychopathology and review once available. I will gather additional collateral information from friends, family and o/p treatment team to further elucidate the nature of patient's psychopathology and baselline level of psychiatric functioning. I certify that this patient's inpatient psychiatric hospital services furnished since the previous certification were, and continue to be, required for treatment that could reasonably be expected to improve the patient's condition, or for diagnostic study, and that the patient continues to need, on a daily basis, active treatment furnished directly by or requiring the supervision of inpatient psychiatric facility personnel. In addition, the hospital records show that services furnished were intensive treatment services, admission or related services, or equivalent services.     EXPECTED DISCHARGE DATE/DAY: TBD     DISPOSITION: Home/[facility]       Signed By:   Thaddeus Dc MD  6/26/2018

## 2018-06-26 NOTE — BH NOTES
Pt observed resting quietly in bed with eyes closed. Respirations even and unlabored. No s/s distress noted, no complaints voiced. Pt slept 6 hours. Will continue Q 15 minute monitoring for safety.

## 2018-06-26 NOTE — BH NOTES
Pt is oriented only to self.  Pt is attending groups, sleeping well and meal compliant.  Pt's mood is better and affect is brighter.  Pt's thought process is illogical due to dementia diagnosis.  This  sent [facility] to [facility] in [address] [place] [address] Street - they have one female bed available. Family was encouraged to contact Nicola Smith - admissions coordinator at [facility] [phone].

## 2018-06-26 NOTE — PROGRESS NOTES
Problem: Dementia-BEHAVIORAL HEALTH (Adult/Pediatric)  Goal: *STG: Remains safe in hospital  Outcome: Progressing Towards Goal  Pt has been isolated to her room most of the time during the shift. Pt continues to wander but redirectable. Pt was food and med compliant with prompt. Pt denied any S/I and H/I at this time. Pt encouraged to attend all group activities and to discuss any issues or concerns with staff. Staff will also continue to monitor pt with Q -15 checks for safety.

## 2018-06-27 PROCEDURE — 65220000003 HC RM SEMIPRIVATE PSYCH

## 2018-06-27 PROCEDURE — 74011250637 HC RX REV CODE- 250/637: Performed by: PSYCHIATRY & NEUROLOGY

## 2018-06-27 PROCEDURE — 74011250637 HC RX REV CODE- 250/637: Performed by: INTERNAL MEDICINE

## 2018-06-27 RX ADMIN — FAMOTIDINE 20 MG: 20 TABLET ORAL at 09:44

## 2018-06-27 RX ADMIN — DILTIAZEM HYDROCHLORIDE 120 MG: 120 TABLET, EXTENDED RELEASE ORAL at 09:44

## 2018-06-27 RX ADMIN — MEMANTINE 5 MG: 10 TABLET ORAL at 17:36

## 2018-06-27 RX ADMIN — CARVEDILOL 3.12 MG: 3.12 TABLET, FILM COATED ORAL at 17:35

## 2018-06-27 RX ADMIN — MEMANTINE 5 MG: 10 TABLET ORAL at 09:44

## 2018-06-27 RX ADMIN — ASPIRIN 81 MG 81 MG: 81 TABLET ORAL at 09:44

## 2018-06-27 RX ADMIN — OLANZAPINE 2.5 MG: 2.5 TABLET, FILM COATED ORAL at 19:53

## 2018-06-27 RX ADMIN — CARVEDILOL 3.12 MG: 3.12 TABLET, FILM COATED ORAL at 09:44

## 2018-06-27 NOTE — PROGRESS NOTES
Patient alert and oriented to person only. Calm and cooperative with staff. Isolative to room for majority of the time but present in milieu for meals. Patient compliant with meals and medications. Denies thoughts of harming self or others. Denies visual, auditory and tactile hallucinations. Will continue to monitor and assess.

## 2018-06-27 NOTE — BH NOTES
PSYCHIATRIC PROGRESS NOTE         Patient Name  Ellen Arana   Date of Birth 1940   [facility] [id number]   Medical Record Number  [id number]      Age  68 y.o. PCP Berenice Spears, MD   Admit date:  6/17/2018    Room Number  321/01  @ [facility]   Date of Service  6/27/2018           E & M PROGRESS NOTE:         HISTORY       CC:  \"confused, memory impairment\"  HISTORY OF PRESENT ILLNESS/INTERVAL HISTORY:  (reviewed/updated 6/27/2018). per initial evaluation: The patient, Ellen Arana, is a 68 y.o. WHITE [place]  female with a past psychiatric history significant for dementia, who presents at this time with complaints of (and/or evidence of) the following emotional symptoms: memory impairment. Additional symptomatology include disoriented to time and place, poor memory and behavior issues. pt has been incontent and need assistance with ADLs. She is irritable and easily gets loud and labile in mood. She was dc to medical unit for tx of hypotension and now back on U for further tx  The above symptoms have been present for years. These symptoms are of severe severity. These symptoms are constant  in nature. Ellen Arana presents/reports/evidences the following emotional symptoms today, 6/27/2018:memory impairment. The above symptoms have been present for years. These symptoms are of moderate severity. The symptoms are constant  in nature. Additional symptomatology and features include confused , disoriented to place and time. Pt is now accepting her med and need assistance with ADLs due to incontinence. No aggressive behavior and affect is bright , sleep is improving, pleasant and co operative. Received no prn.  6/25/18 she is compliant with medications and no anger issues like before and she gets confused and disoriented   6/26/18 she is doing better and mood is better and no anger issues and no SI or HI and slept well last night  6/27/18 she is the same and she is not engaging well and she has no anger issues and slept fairly last night but she was sleepy this morning and not engaging        SIDE EFFECTS: (reviewed/updated 6/27/2018)  None reported or admitted to. No noted toxicity with use of current med   ALLERGIES:(reviewed/updated 6/27/2018)  Allergies   Allergen Reactions    Cephalosporins Itching    Codeine Hives    Erythromycin Hives    Nubain [Nalbuphine] Hives    Pcn [Penicillins] Hives    Percodan [Oxycodone Hcl-Oxycodone-Asa] Hives    Promethazine Hives    Protonix [Pantoprazole] Diarrhea    Sulfa (Sulfonamide Antibiotics) Hives    Toradol [Ketorolac] Hives    Ultram [Tramadol] Hives    Valium [Diazepam] Hives    Prilosec [Omeprazole] Itching      MEDICATIONS PRIOR TO ADMISSION:(reviewed/updated 6/27/2018)  Prescriptions Prior to Admission   Medication Sig    multivit-minerals/folic acid (WOMEN'S MULTIVITAMIN GUMMIES PO) Take 1 Tab by mouth daily.  QUEtiapine (SEROQUEL) 25 mg tablet Take 12.5 mg by mouth daily.  carvedilol (COREG) 3.125 mg tablet Take 3.125 mg by mouth two (2) times daily (with meals).  diclofenac (VOLTAREN) 1 % gel Apply 2 g to affected area every six (6) hours as needed (arthritis pain).  acetaminophen (TYLENOL) 325 mg tablet Take 650 mg by mouth every eight (8) hours as needed for Pain.  Menthol (ASPERCREME HEAT) 10 % gel by Apply Externally route every eight (8) hours as needed (arthritis).  aspirin 81 mg chewable tablet Take 81 mg by mouth daily. Indications: myocardial infarction prevention    dilTIAZem XR (DILACOR XR) 120 mg XR capsule Take 120 mg by mouth daily. Indications: hypertension, VENTRICULAR RATE CONTROL IN ATRIAL FIBRILLATION    furosemide (LASIX) 20 mg tablet Take 20 mg by mouth daily as needed. Indications: Edema, hypertension    loratadine (CLARITIN) 10 mg tablet Take 10 mg by mouth daily as needed. Indications: Allergic Rhinitis, SNEEZING    famotidine (PEPCID) 20 mg tablet Take 20 mg by mouth daily. Indications: Heartburn    QUEtiapine (SEROQUEL) 25 mg tablet Take 25 mg by mouth nightly. Indications: Generalized Anxiety Disorder      PAST MEDICAL HISTORY: Past medical history from the initial psychiatric evaluation has been reviewed (reviewed/updated 6/27/2018) with no additional updates (I asked patient and no additional past medical history provided). No past medical history on file. No past surgical history on file. SOCIAL HISTORY: Social history from the initial psychiatric evaluation has been reviewed (reviewed/updated 6/27/2018) with no additional updates (I asked patient and no additional social history provided). Social History     Social History    Marital status: SINGLE     Spouse name: N/A    Number of children: N/A    Years of education: N/A     Occupational History    Not on file. Social History Main Topics    Smoking status: Not on file    Smokeless tobacco: Not on file    Alcohol use Not on file    Drug use: Not on file    Sexual activity: Not on file     Other Topics Concern    Not on file     Social History Narrative      FAMILY HISTORY: Family history from the initial psychiatric evaluation has been reviewed (reviewed/updated 6/27/2018) with no additional updates (I asked patient and no additional family history provided). No family history on file.     REVIEW OF SYSTEMS: (reviewed/updated 6/27/2018)  Appetite:poor   Sleep: decreased more than normal   All other Review of Systems: Psychological ROS: positive for - behavioral disorder, concentration difficulties, disorientation and memory difficulties         [address] [facility] ([facility] [place]    MSE FINDINGS ARE WITHIN NORMAL LIMITS (WNL) UNLESS OTHERWISE STATED BELOW. ( ALL OF THE BELOW CATEGORIES OF THE MSE HAVE BEEN REVIEWED (reviewed 6/27/2018) AND UPDATED AS DEEMED APPROPRIATE )  General Presentation age appropriate and casually dressed, unreliable and vague   Orientation disorganized   Vital Signs See below (reviewed 6/27/2018); Vital Signs (BP, Pulse, & Temp) are within normal limits if not listed below. Gait and Station Stable/steady, no ataxia   Musculoskeletal System No extrapyramidal symptoms (EPS); no abnormal muscular movements or Tardive Dyskinesia (TD); muscle strength and tone are within normal limits   Language No aphasia or dysarthria   Speech:  hypoverbal   Thought Processes illogical; slow rate of thoughts; poor abstract reasoning/computation   Thought Associations tangential   Thought Content free of delusions, free of hallucinations and preoccupations   Suicidal Ideations none   Homicidal Ideations none   Mood:  Less anxious   Affect:  anxious and increased in intensity   Memory recent  impaired   Memory remote:  impaired   Concentration/Attention:  distractable   Fund of Knowledge below average   Insight:  poor   Reliability poor   Judgment:  poor          VITALS:     Patient Vitals for the past 24 hrs:   Pulse BP   06/26/18 1747 64 122/53     Wt Readings from Last 3 Encounters:   06/18/18 54.6 kg (120 lb 4.8 oz)     Temp Readings from Last 3 Encounters:   06/17/18 97.6 °F (36.4 °C)   06/15/18 97.6 °F (36.4 °C)     BP Readings from Last 3 Encounters:   06/26/18 122/53   06/17/18 102/47   06/16/18 99/55     Pulse Readings from Last 3 Encounters:   06/26/18 64   06/17/18 70   06/16/18 81            DATA     LABORATORY DATA:(reviewed/updated 6/27/2018)  No results found for this or any previous visit (from the past 24 hour(s)). No results found for: VALF2, VALAC, VALP, VALPR, DS6, CRBAM, CRBAMP, CARB2, XCRBAM  No results found for: LITHM   RADIOLOGY REPORTS:(reviewed/updated 6/27/2018)  Xr Chest Port    Result Date: 6/17/2018  EXAM:  XR CHEST PORT INDICATION:  Chest Pain COMPARISON:  None. FINDINGS: A portable AP radiograph of the chest was obtained at 720 hours. The patient is on a cardiac monitor. There is minor right basilar atelectasis.  There is question of a vague right perihilar airspace process. There is slight diffuse interstitial prominence. Heart size is borderline enlarged. Patient status post median sternotomy. IMPRESSION: 1. There is suggestion of a vague right perihilar airspace process and follow-up studies would be helpful to assess for interval change. There is minor right basilar atelectasis.           MEDICATIONS     ALL MEDICATIONS:   Current Facility-Administered Medications   Medication Dose Route Frequency    memantine (NAMENDA) tablet 5 mg  5 mg Oral BID    dilTIAZem ER (CARDIZEM LA) tablet 120 mg  120 mg Oral DAILY    OLANZapine (ZyPREXA) tablet 2.5 mg  2.5 mg Oral Q6H PRN    ziprasidone (GEODON) 10 mg in sterile water (preservative free) 0.5 mL injection  10 mg IntraMUSCular BID PRN    benztropine (COGENTIN) tablet 1 mg  1 mg Oral BID PRN    benztropine (COGENTIN) injection 1 mg  1 mg IntraMUSCular BID PRN    zolpidem (AMBIEN) tablet 5 mg  5 mg Oral QHS PRN    acetaminophen (TYLENOL) tablet 650 mg  650 mg Oral Q4H PRN    magnesium hydroxide (MILK OF MAGNESIA) 400 mg/5 mL oral suspension 30 mL  30 mL Oral DAILY PRN    nicotine (NICODERM CQ) 21 mg/24 hr patch 1 Patch  1 Patch TransDERmal DAILY PRN    aspirin chewable tablet 81 mg  81 mg Oral DAILY    famotidine (PEPCID) tablet 20 mg  20 mg Oral DAILY    carvedilol (COREG) tablet 3.125 mg  3.125 mg Oral BID WITH MEALS      SCHEDULED MEDICATIONS:   Current Facility-Administered Medications   Medication Dose Route Frequency    memantine (NAMENDA) tablet 5 mg  5 mg Oral BID    dilTIAZem ER (CARDIZEM LA) tablet 120 mg  120 mg Oral DAILY    aspirin chewable tablet 81 mg  81 mg Oral DAILY    famotidine (PEPCID) tablet 20 mg  20 mg Oral DAILY    carvedilol (COREG) tablet 3.125 mg  3.125 mg Oral BID WITH MEALS          ASSESSMENT & PLAN     DIAGNOSES REQUIRING ACTIVE TREATMENT AND MONITORING: (reviewed/updated 6/27/2018)  Patient Active Hospital Problem List:   Dementia (6/17/2018)    Assessment: moderate to severe- disoriented, poor memory and irritable. Need total care. No behavior problem and accepting med    Plan:  Karissa Diaz. - optimize dose- 5 mg bid. Pt has lost her VICENTE- will need placement  06/24/18: Continue current treatement  Hypotension  A: recent dc from medical unit- BP is improving  P: ct to monitor by IM- cautious with AP- dc Seroquel  6/25/18 continue same medications   6/26/18 continue same medications   6/27/18 continue same medications          In summary, Karri Garcia, is a 68 y.o.  female who presents with a severe exacerbation of the principal diagnosis of Dementia  Patient's condition is worsening/not improving/not stable . Patient requires continued inpatient hospitalization for further stabilization, safety monitoring and medication management. I will continue to coordinate the provision of individual, milieu, occupational, group, and substance abuse therapies to address target symptoms/diagnoses as deemed appropriate for the individual patient. A coordinated, multidisplinary treatment team round was conducted with the patient (this team consists of the nurse, psychiatric unit pharmcist,  and writer). Complete current electronic health record for patient has been reviewed today including consultant notes, ancillary staff notes, nurses and psychiatric tech notes. Suicide risk assessment completed and patient deemed to be of low risk for suicide at this time. The following regarding medications was addressed during rounds with patient:   the risks and benefits of the proposed medication. The patient was given the opportunity to ask questions. Informed consent given to the use of the above medications. Will continue to adjust psychiatric and non-psychiatric medications (see above \"medication\" section and orders section for details) as deemed appropriate & based upon diagnoses and response to treatment.      I will continue to order blood tests/labs and diagnostic tests as deemed appropriate and review results as they become available (see orders for details and above listed lab/test results). I will order psychiatric records from previous [facility] hospitals to further elucidate the nature of patient's psychopathology and review once available. I will gather additional collateral information from friends, family and o/p treatment team to further elucidate the nature of patient's psychopathology and baselline level of psychiatric functioning. I certify that this patient's inpatient psychiatric hospital services furnished since the previous certification were, and continue to be, required for treatment that could reasonably be expected to improve the patient's condition, or for diagnostic study, and that the patient continues to need, on a daily basis, active treatment furnished directly by or requiring the supervision of inpatient psychiatric facility personnel. In addition, the hospital records show that services furnished were intensive treatment services, admission or related services, or equivalent services.     EXPECTED DISCHARGE DATE/DAY: TBD     DISPOSITION: Home/V[facility]E       Signed By:   Hany Lainez MD  6/27/2018

## 2018-06-27 NOTE — BH NOTES
Pt. Spends most of the time in her room on the bed, sitting quietly. Compliant with medications and meals, and goes to the day room, watches TV. Requested no prn. When asked her birthday, she is very unsure. Even with cue, she doesn't remember. Pleasant.

## 2018-06-27 NOTE — BH NOTES
Pt observed resting quietly in bed with eyes closed, respirations even and unlabored. No s/s distress noted, no complaints voiced. Pt slept 5 hours. Will continue Q 15 minute monitoring for safety.

## 2018-06-27 NOTE — BH NOTES
GROUP THERAPY PROGRESS NOTE    The patient Adriel Mcqueen a 68 y.o. female is participating in [employer]. Group time: 1 hour    Personal goal for participation: To concentrate on selected task    Goal orientation: social    Group therapy participation: active    Therapeutic interventions reviewed and discussed: Crafts, games, music    Impression of participation: The patient was attentive.     Giovanna Moreno  6/27/2018  5:48 PM

## 2018-06-28 PROCEDURE — 74011250637 HC RX REV CODE- 250/637: Performed by: PSYCHIATRY & NEUROLOGY

## 2018-06-28 PROCEDURE — 74011250637 HC RX REV CODE- 250/637: Performed by: INTERNAL MEDICINE

## 2018-06-28 PROCEDURE — 65220000003 HC RM SEMIPRIVATE PSYCH

## 2018-06-28 RX ORDER — RISPERIDONE 0.25 MG/1
0.5 TABLET, FILM COATED ORAL
Status: DISCONTINUED | OUTPATIENT
Start: 2018-06-28 | End: 2018-06-29

## 2018-06-28 RX ADMIN — OLANZAPINE 2.5 MG: 2.5 TABLET, FILM COATED ORAL at 22:24

## 2018-06-28 RX ADMIN — MEMANTINE 5 MG: 10 TABLET ORAL at 10:38

## 2018-06-28 RX ADMIN — CARVEDILOL 3.12 MG: 3.12 TABLET, FILM COATED ORAL at 16:51

## 2018-06-28 RX ADMIN — FAMOTIDINE 20 MG: 20 TABLET ORAL at 10:38

## 2018-06-28 RX ADMIN — ZOLPIDEM TARTRATE 5 MG: 5 TABLET ORAL at 21:08

## 2018-06-28 RX ADMIN — RISPERIDONE 0.5 MG: 0.25 TABLET ORAL at 21:08

## 2018-06-28 RX ADMIN — MEMANTINE 5 MG: 10 TABLET ORAL at 16:52

## 2018-06-28 RX ADMIN — ASPIRIN 81 MG 81 MG: 81 TABLET ORAL at 10:38

## 2018-06-28 NOTE — BH NOTES
Pt observed resting quietly in bed with eyes closed. Respirations even and unlabored. No s/s distress noted, no complaints voiced. Pt slept 7 hours. Will continue to monitor Q 15 minutes for safety.
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
## 2018-06-28 NOTE — BH NOTES
GROUP THERAPY PROGRESS NOTE    The patient Una Go a 68 y.o. female is participating in [employer] Group. Group time: 1 hour    Personal goal for participation:  To concentrate on selected task    Goal orientation: social    Group therapy participation: minimal    Therapeutic interventions reviewed and discussed: Crafts, games, music    Impression of participation: The patient was [id number] upon approach    Miko Grant  6/28/2018  5:35 PM
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
## 2018-06-28 NOTE — PROGRESS NOTES
Patient alert and oriented to person only. Calm and cooperative with staff. Isolative to room majority of the time. Visible in milieu for lunch and at times with encouragement and direction from staff. Patient denies thoughts of harming self or others. Denies auditory, visual and tactile hallucinations. Can be anxious, paranoid and delusional at times. Patient compliant with meals and medications. Will continue to monitor and assess.

## 2018-06-28 NOTE — PROGRESS NOTES
Laboratory monitoring for mood stabilizer and antipsychotics:    Recommended baseline monitoring has been completed based on this patient's current medication regimen. The following labs were completed at transferring facility: Urinalysis (06/15/2018) moderate leukocyte esterase, WBC: 2-5, squamous epithelial cells: 2-5; UDS: negative     The patient is currently taking the following medication(s):   Current Facility-Administered Medications   Medication Dose Route Frequency    risperiDONE (RisperDAL) tablet 0.5 mg  0.5 mg Oral QHS    memantine (NAMENDA) tablet 5 mg  5 mg Oral BID    dilTIAZem ER (CARDIZEM LA) tablet 120 mg  120 mg Oral DAILY    aspirin chewable tablet 81 mg  81 mg Oral DAILY    famotidine (PEPCID) tablet 20 mg  20 mg Oral DAILY    carvedilol (COREG) tablet 3.125 mg  3.125 mg Oral BID WITH MEALS       Height, Weight, BMI Estimation  Estimated body mass index is 22.55 kg/(m^2) as calculated from the following:    Height as of this encounter: 155.6 cm (61.25\"). Weight as of this encounter: 54.6 kg (120 lb 4.8 oz). Renal Function, Hepatic Function and Chemistry  Estimated Creatinine Clearance: 36.9 mL/min (based on Cr of 0.96). Lab Results   Component Value Date/Time    Sodium 138 06/17/2018 01:29 AM    Potassium 3.6 06/17/2018 01:29 AM    Chloride 104 06/17/2018 01:29 AM    CO2 25 06/17/2018 01:29 AM    Anion gap 9 06/17/2018 01:29 AM    Glucose 128 (H) 06/17/2018 01:29 AM    BUN 23 (H) 06/17/2018 01:29 AM    Creatinine 0.96 06/17/2018 01:29 AM    BUN/Creatinine ratio 24 (H) 06/17/2018 01:29 AM    GFR est AA >60 06/17/2018 01:29 AM    GFR est non-AA 56 (L) 06/17/2018 01:29 AM    Calcium 8.4 (L) 06/17/2018 01:29 AM    ALT (SGPT) 11 (L) 06/17/2018 01:29 AM    AST (SGOT) 17 06/17/2018 01:29 AM    Alk.  phosphatase 102 06/17/2018 01:29 AM    Protein, total 6.6 06/17/2018 01:29 AM    Albumin 2.7 (L) 06/17/2018 01:29 AM    Globulin 3.9 06/17/2018 01:29 AM    A-G Ratio 0.7 (L) 06/17/2018 01:29 AM    Bilirubin, total 0.3 06/17/2018 01:29 AM       Lab Results   Component Value Date/Time    Glucose 128 (H) 06/17/2018 01:29 AM       Lab Results   Component Value Date/Time    Hemoglobin A1c 5.3 06/29/2018 04:38 AM       Hematology  Lab Results   Component Value Date/Time    WBC 6.7 06/17/2018 01:29 AM    HGB 12.6 06/17/2018 01:29 AM    HCT 38.6 06/17/2018 01:29 AM    PLATELET 895 73/32/2453 01:29 AM    MCV 90.8 06/17/2018 01:29 AM       Lipids  Lab Results   Component Value Date/Time    Cholesterol, total 164 06/29/2018 04:38 AM    HDL Cholesterol 51 06/29/2018 04:38 AM    LDL, calculated 94.4 06/29/2018 04:38 AM    Triglyceride 93 06/29/2018 04:38 AM    CHOL/HDL Ratio 3.2 06/29/2018 04:38 AM       Thyroid Function    Lab Results   Component Value Date/Time    TSH 3.44 06/17/2018 01:29 AM     Vitals  Visit Vitals    /45    Pulse 60    Temp 97.3 °F (36.3 °C)    Resp 16    Ht 155.6 cm (61.25\")    Wt 54.6 kg (120 lb 4.8 oz)    SpO2 99%    Breastfeeding No    BMI 22.55 kg/m2       Mey Rodriguez, PharmD, BCPS  [phone] (pharmacy)

## 2018-06-28 NOTE — PROGRESS NOTES
GROUP THERAPY PROGRESS NOTE      Ata Gómez was present for medication group. GROUP TIME: 45 minutes, Thursdays 2pm    PERSONAL GOAL FOR PARTICIPATION: To be present for group, participate in discussion, and answer patient-directed questions. GOAL ORIENTATION: Personal    THERAPEUTIC INTERVENTIONS REVIEWED AND DISCUSSED: The following topics were presented: storage of medications, how to remember to refill medications and keep up with doctor appointments, relapse prevention, keeping a record of all medication including prescription and non-prescription drugs, and who to contact with medication questions. Patients were given time to ask questions regarding their current therapy. IMPRESSION OF PARTICIPATION: Patient was a minimal participant in group discussions and colored in her coloring book through the entire group. Patient did not want to participate in medication bingo.       Santa Vigil, ELIZABETHD, BCPS

## 2018-06-29 LAB
CHOLEST SERPL-MCNC: 164 MG/DL
EST. AVERAGE GLUCOSE BLD GHB EST-MCNC: 105 MG/DL
HBA1C MFR BLD: 5.3 % (ref 4.2–6.3)
HDLC SERPL-MCNC: 51 MG/DL
HDLC SERPL: 3.2 {RATIO} (ref 0–5)
LDLC SERPL CALC-MCNC: 94.4 MG/DL (ref 0–100)
LIPID PROFILE,FLP: NORMAL
TRIGL SERPL-MCNC: 93 MG/DL (ref ?–150)
VLDLC SERPL CALC-MCNC: 18.6 MG/DL

## 2018-06-29 PROCEDURE — 65220000003 HC RM SEMIPRIVATE PSYCH

## 2018-06-29 PROCEDURE — 74011250637 HC RX REV CODE- 250/637: Performed by: PSYCHIATRY & NEUROLOGY

## 2018-06-29 PROCEDURE — 80061 LIPID PANEL: CPT | Performed by: PSYCHIATRY & NEUROLOGY

## 2018-06-29 PROCEDURE — 74011250637 HC RX REV CODE- 250/637: Performed by: INTERNAL MEDICINE

## 2018-06-29 PROCEDURE — 83036 HEMOGLOBIN GLYCOSYLATED A1C: CPT | Performed by: PSYCHIATRY & NEUROLOGY

## 2018-06-29 PROCEDURE — 36415 COLL VENOUS BLD VENIPUNCTURE: CPT | Performed by: PSYCHIATRY & NEUROLOGY

## 2018-06-29 RX ORDER — RISPERIDONE 1 MG/1
1 TABLET, FILM COATED ORAL
Status: DISCONTINUED | OUTPATIENT
Start: 2018-06-29 | End: 2018-06-29

## 2018-06-29 RX ORDER — RISPERIDONE 0.25 MG/1
0.5 TABLET, FILM COATED ORAL
Status: DISCONTINUED | OUTPATIENT
Start: 2018-06-29 | End: 2018-07-03

## 2018-06-29 RX ADMIN — CARVEDILOL 3.12 MG: 3.12 TABLET, FILM COATED ORAL at 08:40

## 2018-06-29 RX ADMIN — RISPERIDONE 0.5 MG: 0.25 TABLET ORAL at 21:38

## 2018-06-29 RX ADMIN — ASPIRIN 81 MG 81 MG: 81 TABLET ORAL at 08:39

## 2018-06-29 RX ADMIN — DILTIAZEM HYDROCHLORIDE 120 MG: 120 TABLET, EXTENDED RELEASE ORAL at 08:39

## 2018-06-29 RX ADMIN — ZOLPIDEM TARTRATE 5 MG: 5 TABLET ORAL at 21:38

## 2018-06-29 RX ADMIN — MEMANTINE 5 MG: 10 TABLET ORAL at 08:40

## 2018-06-29 RX ADMIN — CARVEDILOL 3.12 MG: 3.12 TABLET, FILM COATED ORAL at 17:23

## 2018-06-29 RX ADMIN — MEMANTINE 5 MG: 10 TABLET ORAL at 17:23

## 2018-06-29 RX ADMIN — FAMOTIDINE 20 MG: 20 TABLET ORAL at 08:40
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
## 2018-06-29 NOTE — BH NOTES
PSYCHIATRIC PROGRESS NOTE         Patient Name  Tessy Celeste   Date of Birth 1940   [facility] [id number]   Medical Record Number  [id number]      Age  66 y.o. PCP Berenice Spears, MD   Admit date:  6/17/2018    Room Number  321/01  @ [facility]   Date of Service  6/29/2018           E & M PROGRESS NOTE:         HISTORY       CC:  \"confused, memory impairment\"  HISTORY OF PRESENT ILLNESS/INTERVAL HISTORY:  (reviewed/updated 6/29/2018). per initial evaluation: The patient, Tessy Celeste, is a 68 y.o. WHITE [place]  female with a past psychiatric history significant for dementia, who presents at this time with complaints of (and/or evidence of) the following emotional symptoms: memory impairment. Additional symptomatology include disoriented to time and place, poor memory and behavior issues. pt has been incontent and need assistance with ADLs. She is irritable and easily gets loud and labile in mood. She was dc to medical unit for tx of hypotension and now back on U for further tx  The above symptoms have been present for years. These symptoms are of severe severity. These symptoms are constant  in nature. Tessy Celeste presents/reports/evidences the following emotional symptoms today, 6/29/2018:memory impairment. The above symptoms have been present for years. These symptoms are of moderate severity. The symptoms are constant  in nature. Additional symptomatology and features include confused , disoriented to place and time. Pt is now accepting her med and need assistance with ADLs due to incontinence. No aggressive behavior and affect is bright , sleep is improving, pleasant and co operative. Received no prn.  6/25/18 she is compliant with medications and no anger issues like before and she gets confused and disoriented   6/26/18 she is doing better and mood is better and no anger issues and no SI or HI and slept well last night  6/27/18 she is the same and she is not engaging well and she has no anger issues and slept fairly last night but she was sleepy this morning and not engaging    6/28- remains confused with memory impairment. Pt has been hallucinating and paranoid- hiding behind her bed stating a women has been raped and stabbed. Received prn med. Sleep is better  6/29- episode of paranoid and mostly sun downing. Appear pleasant in the morning and no recollection of night time paranoia. unsteady gait due to sedation      SIDE EFFECTS: (reviewed/updated 6/29/2018)  None reported or admitted to. No noted toxicity with use of current med   ALLERGIES:(reviewed/updated 6/29/2018)  Allergies   Allergen Reactions    Cephalosporins Itching    Codeine Hives    Erythromycin Hives    Nubain [Nalbuphine] Hives    Pcn [Penicillins] Hives    Percodan [Oxycodone Hcl-Oxycodone-Asa] Hives    Promethazine Hives    Protonix [Pantoprazole] Diarrhea    Sulfa (Sulfonamide Antibiotics) Hives    Toradol [Ketorolac] Hives    Ultram [Tramadol] Hives    Valium [Diazepam] Hives    Prilosec [Omeprazole] Itching      MEDICATIONS PRIOR TO ADMISSION:(reviewed/updated 6/29/2018)  Prescriptions Prior to Admission   Medication Sig    multivit-minerals/folic acid (WOMEN'S MULTIVITAMIN GUMMIES PO) Take 1 Tab by mouth daily.  QUEtiapine (SEROQUEL) 25 mg tablet Take 12.5 mg by mouth daily.  carvedilol (COREG) 3.125 mg tablet Take 3.125 mg by mouth two (2) times daily (with meals).  diclofenac (VOLTAREN) 1 % gel Apply 2 g to affected area every six (6) hours as needed (arthritis pain).  acetaminophen (TYLENOL) 325 mg tablet Take 650 mg by mouth every eight (8) hours as needed for Pain.  Menthol (ASPERCREME HEAT) 10 % gel by Apply Externally route every eight (8) hours as needed (arthritis).  aspirin 81 mg chewable tablet Take 81 mg by mouth daily. Indications: myocardial infarction prevention    dilTIAZem XR (DILACOR XR) 120 mg XR capsule Take 120 mg by mouth daily.  Indications: hypertension, VENTRICULAR RATE CONTROL IN ATRIAL FIBRILLATION    furosemide (LASIX) 20 mg tablet Take 20 mg by mouth daily as needed. Indications: Edema, hypertension    loratadine (CLARITIN) 10 mg tablet Take 10 mg by mouth daily as needed. Indications: Allergic Rhinitis, SNEEZING    famotidine (PEPCID) 20 mg tablet Take 20 mg by mouth daily. Indications: Heartburn    QUEtiapine (SEROQUEL) 25 mg tablet Take 25 mg by mouth nightly. Indications: Generalized Anxiety Disorder      PAST MEDICAL HISTORY: Past medical history from the initial psychiatric evaluation has been reviewed (reviewed/updated 6/29/2018) with no additional updates (I asked patient and no additional past medical history provided). No past medical history on file. No past surgical history on file. SOCIAL HISTORY: Social history from the initial psychiatric evaluation has been reviewed (reviewed/updated 6/29/2018) with no additional updates (I asked patient and no additional social history provided). Social History     Social History    Marital status: SINGLE     Spouse name: N/A    Number of children: N/A    Years of education: N/A     Occupational History    Not on file. Social History Main Topics    Smoking status: Not on file    Smokeless tobacco: Not on file    Alcohol use Not on file    Drug use: Not on file    Sexual activity: Not on file     Other Topics Concern    Not on file     Social History Narrative      FAMILY HISTORY: Family history from the initial psychiatric evaluation has been reviewed (reviewed/updated 6/29/2018) with no additional updates (I asked patient and no additional family history provided). No family history on file.     REVIEW OF SYSTEMS: (reviewed/updated 6/29/2018)  Appetite:poor   Sleep: decreased more than normal   All other Review of Systems: Psychological ROS: positive for - behavioral disorder, concentration difficulties, disorientation and memory difficulties         MENTAL STATUS EXAM & VITALS     MENTAL STATUS EXAM (MSE):    MSE FINDINGS ARE WITHIN NORMAL LIMITS (WNL) UNLESS OTHERWISE STATED BELOW. ( ALL OF THE BELOW CATEGORIES OF THE MSE HAVE BEEN REVIEWED (reviewed 6/29/2018) AND UPDATED AS DEEMED APPROPRIATE )  General Presentation age appropriate and casually dressed, unreliable and vague   Orientation disorganized   Vital Signs  See below (reviewed 6/29/2018); Vital Signs (BP, Pulse, & Temp) are within normal limits if not listed below.    Gait and Station Stable/steady, no ataxia   Musculoskeletal System No extrapyramidal symptoms (EPS); no abnormal muscular movements or Tardive Dyskinesia (TD); muscle strength and tone are within normal limits   Language No aphasia or dysarthria   Speech:  hypoverbal   Thought Processes illogical; slow rate of thoughts; poor abstract reasoning/computation   Thought Associations tangential   Thought Content free of delusions, free of hallucinations and preoccupations   Suicidal Ideations none   Homicidal Ideations none   Mood:  Less anxious   Affect:  anxious and increased in intensity   Memory recent  impaired   Memory remote:  impaired   Concentration/Attention:  distractable   Fund of Knowledge below average   Insight:  poor   Reliability poor   Judgment:  poor          VITALS:     Patient Vitals for the past 24 hrs:   Temp Pulse Resp BP   06/29/18 0839 - 60 - 118/45   06/29/18 0609 - (!) 59 16 118/47   06/28/18 1755 97.3 °F (36.3 °C) 62 16 138/51     Wt Readings from Last 3 Encounters:   06/18/18 54.6 kg (120 lb 4.8 oz)     Temp Readings from Last 3 Encounters:   06/17/18 97.6 °F (36.4 °C)   06/15/18 97.6 °F (36.4 °C)     BP Readings from Last 3 Encounters:   06/29/18 118/45   06/17/18 102/47   06/16/18 99/55     Pulse Readings from Last 3 Encounters:   06/29/18 60   06/17/18 70   06/16/18 81            DATA     LABORATORY DATA:(reviewed/updated 6/29/2018)  Recent Results (from the past 24 hour(s))   LIPID PANEL    Collection Time: 06/29/18  4:38 AM Result Value Ref Range    LIPID PROFILE          Cholesterol, total 164 <200 MG/DL    Triglyceride 93 <150 MG/DL    HDL Cholesterol 51 MG/DL    LDL, calculated 94.4 0 - 100 MG/DL    VLDL, calculated 18.6 MG/DL    CHOL/HDL Ratio 3.2 0 - 5.0     HEMOGLOBIN A1C WITH EAG    Collection Time: 06/29/18  4:38 AM   Result Value Ref Range    Hemoglobin A1c 5.3 4.2 - 6.3 %    Est. average glucose 105 mg/dL     No results found for: VALF2, VALAC, VALP, VALPR, DS6, CRBAM, CRBAMP, CARB2, XCRBAM  No results found for: LITHM   RADIOLOGY REPORTS:(reviewed/updated 6/29/2018)  Xr Chest Port    Result Date: 6/17/2018  EXAM:  XR CHEST PORT INDICATION:  Chest Pain COMPARISON:  None. FINDINGS: A portable AP radiograph of the chest was obtained at 720 hours. The patient is on a cardiac monitor. There is minor right basilar atelectasis. There is question of a vague right perihilar airspace process. There is slight diffuse interstitial prominence. Heart size is borderline enlarged. Patient status post median sternotomy. IMPRESSION: 1. There is suggestion of a vague right perihilar airspace process and follow-up studies would be helpful to assess for interval change. There is minor right basilar atelectasis.           MEDICATIONS     ALL MEDICATIONS:   Current Facility-Administered Medications   Medication Dose Route Frequency    risperiDONE (RisperDAL) tablet 0.5 mg  0.5 mg Oral QHS    memantine (NAMENDA) tablet 5 mg  5 mg Oral BID    dilTIAZem ER (CARDIZEM LA) tablet 120 mg  120 mg Oral DAILY    OLANZapine (ZyPREXA) tablet 2.5 mg  2.5 mg Oral Q6H PRN    ziprasidone (GEODON) 10 mg in sterile water (preservative free) 0.5 mL injection  10 mg IntraMUSCular BID PRN    benztropine (COGENTIN) tablet 1 mg  1 mg Oral BID PRN    benztropine (COGENTIN) injection 1 mg  1 mg IntraMUSCular BID PRN    zolpidem (AMBIEN) tablet 5 mg  5 mg Oral QHS PRN    acetaminophen (TYLENOL) tablet 650 mg  650 mg Oral Q4H PRN    magnesium hydroxide (MILK OF MAGNESIA) 400 mg/5 mL oral suspension 30 mL  30 mL Oral DAILY PRN    nicotine (NICODERM CQ) 21 mg/24 hr patch 1 Patch  1 Patch TransDERmal DAILY PRN    aspirin chewable tablet 81 mg  81 mg Oral DAILY    famotidine (PEPCID) tablet 20 mg  20 mg Oral DAILY    carvedilol (COREG) tablet 3.125 mg  3.125 mg Oral BID WITH MEALS      SCHEDULED MEDICATIONS:   Current Facility-Administered Medications   Medication Dose Route Frequency    risperiDONE (RisperDAL) tablet 0.5 mg  0.5 mg Oral QHS    memantine (NAMENDA) tablet 5 mg  5 mg Oral BID    dilTIAZem ER (CARDIZEM LA) tablet 120 mg  120 mg Oral DAILY    aspirin chewable tablet 81 mg  81 mg Oral DAILY    famotidine (PEPCID) tablet 20 mg  20 mg Oral DAILY    carvedilol (COREG) tablet 3.125 mg  3.125 mg Oral BID WITH MEALS          ASSESSMENT & PLAN     DIAGNOSES REQUIRING ACTIVE TREATMENT AND MONITORING: (reviewed/updated 6/29/2018)  Patient Active Hospital Problem List:   Dementia (6/17/2018)    Assessment: moderate to severe- disoriented, poor memory and irritable. Need total care. No behavior problem and accepting med    Plan:  Benji Hoover. - optimize dose- 5 mg bid. Pt has lost her VICENTE- will need placement  06/24/18: Continue current treatement  Hypotension  A: recent dc from medical unit- BP is improving  P: ct to monitor by IM- cautious with AP- dc Seroquel  6/25/18 continue same medications   6/26/18 continue same medications   6/27/18 continue same medications    6/28- add Risperdal and titrate. Ct with Namenda  6/29- cautious with prn Zyprexa due to unsteady gait- ct to adjust med      In summary, Lamin Ricks, is a 66 y.o.  female who presents with a severe exacerbation of the principal diagnosis of Dementia  Patient's condition is worsening/not improving/not stable . Patient requires continued inpatient hospitalization for further stabilization, safety monitoring and medication management.   I will continue to coordinate the provision of individual, milieu, occupational, group, and substance abuse therapies to address target symptoms/diagnoses as deemed appropriate for the individual patient. A coordinated, multidisplinary treatment team round was conducted with the patient (this team consists of the nurse, psychiatric unit pharmcist,  and writer). Complete current electronic health record for patient has been reviewed today including consultant notes, ancillary staff notes, nurses and psychiatric tech notes. Suicide risk assessment completed and patient deemed to be of low risk for suicide at this time. The following regarding medications was addressed during rounds with patient:   the risks and benefits of the proposed medication. The patient was given the opportunity to ask questions. Informed consent given to the use of the above medications. Will continue to adjust psychiatric and non-psychiatric medications (see above \"medication\" section and orders section for details) as deemed appropriate & based upon diagnoses and response to treatment. I will continue to order blood tests/labs and diagnostic tests as deemed appropriate and review results as they become available (see orders for details and above listed lab/test results). I will order psychiatric records from previous [facility] hospitals to further elucidate the nature of patient's psychopathology and review once available. I will gather additional collateral information from friends, family and o/p treatment team to further elucidate the nature of patient's psychopathology and baselline level of psychiatric functioning.          I certify that this patient's inpatient psychiatric hospital services furnished since the previous certification were, and continue to be, required for treatment that could reasonably be expected to improve the patient's condition, or for diagnostic study, and that the patient continues to need, on a daily basis, active treatment furnished directly by or requiring the supervision of inpatient psychiatric facility personnel. In addition, the hospital records show that services furnished were intensive treatment services, admission or related services, or equivalent services.     EXPECTED DISCHARGE DATE/DAY: TBD     DISPOSITION: Home/snf       Signed By:   Elisabeth Yepez MD  6/29/2018

## 2018-06-29 NOTE — BH NOTES
GROUP THERAPY PROGRESS NOTE    The patient Lavon lew 66 y.o. female is participating in [employer]. Group time: 1 hour    Personal goal for participation: To concentrate on selected task    Goal orientation: social    Group therapy participation: active    Therapeutic interventions reviewed and discussed: Crafts, games, music    Impression of participation: The patient was attentive.     Noel Lamar  6/29/2018  5:30 PM

## 2018-06-29 NOTE — BH NOTES
GROUP THERAPY PROGRESS NOTE    The patient Fernando lew 66 y.o. female is participating in [employer]. Group time: 45 minutes    Personal goal for participation:  To participate in stress management [employer] game    Goal orientation:  personal    Group therapy participation: minimal    Therapeutic interventions reviewed and discussed: things pertaining to stress    Impression of participation:  The patient was [id number] upon approach    Euna Search  6/29/2018  1:47 PM

## 2018-06-29 NOTE — BH NOTES
Patient slept into the 3 hours of the shift, then got up . Patient was given a late breakfast. Patient continues to demonstrate confusion at all times; mood is labile with some irritable undertones. Patient is eating her meals with minimum assistance. Patient has demonstrated no physical aggression.

## 2018-06-29 NOTE — BH NOTES
Pt started on 1:1 while awake due to pt wandering with unsteady gait. Refusing to remain in room due to delusions and being aggressive with staff. Pt attempted to go through nursing station to acute unit. Pt is currently sitting with T. Order received from  uriel.

## 2018-06-29 NOTE — BH NOTES
When pt gets out of bed her gait remains unsteady. Pt currently laying awake in bed. 1:1 while awake continues.

## 2018-06-29 NOTE — BH NOTES
Pt is oriented only to self.  Pt is attending groups and meal compliant. Today is pt's birthday - pt was able to have positive and friendly conversation with daughter, Lisa Come around 11:00.  + paranoid delusions - told  there is a dead woman in her room, blood on her sheets and she needs a new room around 13:00.

## 2018-06-29 NOTE — PROGRESS NOTES
Problem: Dementia-BEHAVIORAL HEALTH (Adult/Pediatric)  Goal: *STG: Participates in treatment plan  Outcome: Progressing Towards Goal  Pt slept 3 hours until this time. Pt is 1:1 while awake due to unsteady gait, administration of sedating medications and refusal to remain in her room or in bed or in a chair. Pt was combative with staff but responded to 1:1 intervention. Pt labs drawn. VSS. Staff will continue to monitor for health and safety.

## 2018-06-30 PROCEDURE — 74011250637 HC RX REV CODE- 250/637: Performed by: PSYCHIATRY & NEUROLOGY

## 2018-06-30 PROCEDURE — 74011250637 HC RX REV CODE- 250/637: Performed by: INTERNAL MEDICINE

## 2018-06-30 PROCEDURE — 65220000003 HC RM SEMIPRIVATE PSYCH

## 2018-06-30 RX ADMIN — CARVEDILOL 3.12 MG: 3.12 TABLET, FILM COATED ORAL at 09:18

## 2018-06-30 RX ADMIN — RISPERIDONE 0.5 MG: 0.25 TABLET ORAL at 23:34

## 2018-06-30 RX ADMIN — ASPIRIN 81 MG 81 MG: 81 TABLET ORAL at 09:17

## 2018-06-30 RX ADMIN — DILTIAZEM HYDROCHLORIDE 120 MG: 120 TABLET, EXTENDED RELEASE ORAL at 09:17

## 2018-06-30 RX ADMIN — FAMOTIDINE 20 MG: 20 TABLET ORAL at 09:17

## 2018-06-30 RX ADMIN — CARVEDILOL 3.12 MG: 3.12 TABLET, FILM COATED ORAL at 17:06

## 2018-06-30 RX ADMIN — MEMANTINE 5 MG: 10 TABLET ORAL at 17:06

## 2018-06-30 RX ADMIN — MEMANTINE 5 MG: 10 TABLET ORAL at 09:17

## 2018-06-30 NOTE — BH NOTES
Pt is up with assistance. Pt is helped with her ADL and supervised her eating. Pt is alert but remains confused and disoriented x 3. Pt is oriented to her name only. 1:1 continues for confusion safety.

## 2018-06-30 NOTE — BH NOTES
PSYCHIATRIC PROGRESS NOTE         Patient Name  Ellen Arana   Date of Birth 1940   [facility] [id number]   Medical Record Number  [id number]      Age  66 y.o. PCP Berenice Spears, MD   Admit date:  6/17/2018    Room Number  321/01  @ [facility]   Date of Service  6/30/2018           E & M PROGRESS NOTE:         HISTORY       CC:  \"confused, memory impairment\"  HISTORY OF PRESENT ILLNESS/INTERVAL HISTORY:  (reviewed/updated 6/30/2018). per initial evaluation: The patient, Ellen Arana, is a 68 y.o. WHITE [place]  female with a past psychiatric history significant for dementia, who presents at this time with complaints of (and/or evidence of) the following emotional symptoms: memory impairment. Additional symptomatology include disoriented to time and place, poor memory and behavior issues. pt has been incontent and need assistance with ADLs. She is irritable and easily gets loud and labile in mood. She was dc to medical unit for tx of hypotension and now back on U for further tx  The above symptoms have been present for years. These symptoms are of severe severity. These symptoms are constant  in nature. Ellen Arana presents/reports/evidences the following emotional symptoms today, 6/30/2018:memory impairment. The above symptoms have been present for years. These symptoms are of moderate severity. The symptoms are constant  in nature. Additional symptomatology and features include confused , disoriented to place and time. Pt is now accepting her med and need assistance with ADLs due to incontinence. No aggressive behavior and affect is bright , sleep is improving, pleasant and co operative. Received no prn.  6/25/18 she is compliant with medications and no anger issues like before and she gets confused and disoriented   6/26/18 she is doing better and mood is better and no anger issues and no SI or HI and slept well last night  6/27/18 she is the same and she is not engaging well and she has no anger issues and slept fairly last night but she was sleepy this morning and not engaging    6/28- remains confused with memory impairment. Pt has been hallucinating and paranoid- hiding behind her bed stating a women has been raped and stabbed. Received prn med. Sleep is better  6/29- episode of paranoid and mostly sun downing. Appear pleasant in the morning and no recollection of night time paranoia. unsteady gait due to sedation  6/30- pleasant and confused during the daytime, eating her breakfast. Still has paranoid behavior att night      SIDE EFFECTS: (reviewed/updated 6/30/2018)  None reported or admitted to. No noted toxicity with use of current med   ALLERGIES:(reviewed/updated 6/30/2018)  Allergies   Allergen Reactions    Cephalosporins Itching    Codeine Hives    Erythromycin Hives    Nubain [Nalbuphine] Hives    Pcn [Penicillins] Hives    Percodan [Oxycodone Hcl-Oxycodone-Asa] Hives    Promethazine Hives    Protonix [Pantoprazole] Diarrhea    Sulfa (Sulfonamide Antibiotics) Hives    Toradol [Ketorolac] Hives    Ultram [Tramadol] Hives    Valium [Diazepam] Hives    Prilosec [Omeprazole] Itching      MEDICATIONS PRIOR TO ADMISSION:(reviewed/updated 6/30/2018)  Prescriptions Prior to Admission   Medication Sig    multivit-minerals/folic acid (WOMEN'S MULTIVITAMIN GUMMIES PO) Take 1 Tab by mouth daily.  QUEtiapine (SEROQUEL) 25 mg tablet Take 12.5 mg by mouth daily.  carvedilol (COREG) 3.125 mg tablet Take 3.125 mg by mouth two (2) times daily (with meals).  diclofenac (VOLTAREN) 1 % gel Apply 2 g to affected area every six (6) hours as needed (arthritis pain).  acetaminophen (TYLENOL) 325 mg tablet Take 650 mg by mouth every eight (8) hours as needed for Pain.  Menthol (ASPERCREME HEAT) 10 % gel by Apply Externally route every eight (8) hours as needed (arthritis).  aspirin 81 mg chewable tablet Take 81 mg by mouth daily.  Indications: myocardial infarction prevention    dilTIAZem XR (DILACOR XR) 120 mg XR capsule Take 120 mg by mouth daily. Indications: hypertension, VENTRICULAR RATE CONTROL IN ATRIAL FIBRILLATION    furosemide (LASIX) 20 mg tablet Take 20 mg by mouth daily as needed. Indications: Edema, hypertension    loratadine (CLARITIN) 10 mg tablet Take 10 mg by mouth daily as needed. Indications: Allergic Rhinitis, SNEEZING    famotidine (PEPCID) 20 mg tablet Take 20 mg by mouth daily. Indications: Heartburn    QUEtiapine (SEROQUEL) 25 mg tablet Take 25 mg by mouth nightly. Indications: Generalized Anxiety Disorder      PAST MEDICAL HISTORY: Past medical history from the initial psychiatric evaluation has been reviewed (reviewed/updated 6/30/2018) with no additional updates (I asked patient and no additional past medical history provided). No past medical history on file. No past surgical history on file. SOCIAL HISTORY: Social history from the initial psychiatric evaluation has been reviewed (reviewed/updated 6/30/2018) with no additional updates (I asked patient and no additional social history provided). Social History     Social History    Marital status: SINGLE     Spouse name: N/A    Number of children: N/A    Years of education: N/A     Occupational History    Not on file. Social History Main Topics    Smoking status: Not on file    Smokeless tobacco: Not on file    Alcohol use Not on file    Drug use: Not on file    Sexual activity: Not on file     Other Topics Concern    Not on file     Social History Narrative      FAMILY HISTORY: Family history from the initial psychiatric evaluation has been reviewed (reviewed/updated 6/30/2018) with no additional updates (I asked patient and no additional family history provided). No family history on file.     REVIEW OF SYSTEMS: (reviewed/updated 6/30/2018)  Appetite:poor   Sleep: decreased more than normal   All other Review of Systems: Psychological ROS: positive for - behavioral disorder, concentration difficulties, disorientation and memory difficulties         [address] ([facility]):    MSE FINDINGS ARE WITHIN NORMAL LIMITS (WNL) UNLESS OTHERWISE STATED BELOW. ( ALL OF THE BELOW CATEGORIES OF THE [facility] HAVE BEEN REVIEWED (reviewed 6/30/2018) AND UPDATED AS DEEMED APPROPRIATE )  General Presentation age appropriate and casually dressed, unreliable and vague   Orientation disorganized   Vital Signs  See below (reviewed 6/30/2018); Vital Signs (BP, Pulse, & Temp) are within normal limits if not listed below.    Gait and Station Stable/steady, no ataxia   Musculoskeletal System No extrapyramidal symptoms (EPS); no abnormal muscular movements or Tardive Dyskinesia (TD); muscle strength and tone are within normal limits   Language No aphasia or dysarthria   Speech:  hypoverbal   Thought Processes illogical; slow rate of thoughts; poor abstract reasoning/computation   Thought Associations tangential   Thought Content free of delusions, free of hallucinations and preoccupations   Suicidal Ideations none   Homicidal Ideations none   Mood:  Less anxious   Affect:  anxious and increased in intensity   Memory recent  impaired   Memory remote:  impaired   Concentration/Attention:  distractable   Fund of Knowledge below average   Insight:  poor   Reliability poor   Judgment:  poor          VITALS:     Patient Vitals for the past 24 hrs:   Temp Pulse Resp BP   06/30/18 0700 98.5 °F (36.9 °C) 72 - 101/44   06/29/18 2028 - 66 - 141/66   06/29/18 1647 - 73 18 128/56     Wt Readings from Last 3 Encounters:   06/18/18 54.6 kg (120 lb 4.8 oz)     Temp Readings from Last 3 Encounters:   06/30/18 98.5 °F (36.9 °C)   06/17/18 97.6 °F (36.4 °C)   06/15/18 97.6 °F (36.4 °C)     BP Readings from Last 3 Encounters:   06/30/18 101/44   06/17/18 102/47   06/16/18 99/55     Pulse Readings from Last 3 Encounters:   06/30/18 72   06/17/18 70   06/16/18 81            DATA     LABORATORY DATA:(reviewed/updated 6/30/2018)  No results found for this or any previous visit (from the past 24 hour(s)). No results found for: VALF2, VALAC, VALP, VALPR, DS6, CRBAM, CRBAMP, CARB2, XCRBAM  No results found for: LITHM   RADIOLOGY REPORTS:(reviewed/updated 6/30/2018)  Xr Chest Port    Result Date: 6/17/2018  EXAM:  XR CHEST PORT INDICATION:  Chest Pain COMPARISON:  None. FINDINGS: A portable AP radiograph of the chest was obtained at 720 hours. The patient is on a cardiac monitor. There is minor right basilar atelectasis. There is question of a vague right perihilar airspace process. There is slight diffuse interstitial prominence. Heart size is borderline enlarged. Patient status post median sternotomy. IMPRESSION: 1. There is suggestion of a vague right perihilar airspace process and follow-up studies would be helpful to assess for interval change. There is minor right basilar atelectasis.           MEDICATIONS     ALL MEDICATIONS:   Current Facility-Administered Medications   Medication Dose Route Frequency    risperiDONE (RisperDAL) tablet 0.5 mg  0.5 mg Oral QHS    memantine (NAMENDA) tablet 5 mg  5 mg Oral BID    dilTIAZem ER (CARDIZEM LA) tablet 120 mg  120 mg Oral DAILY    ziprasidone (GEODON) 10 mg in sterile water (preservative free) 0.5 mL injection  10 mg IntraMUSCular BID PRN    benztropine (COGENTIN) tablet 1 mg  1 mg Oral BID PRN    benztropine (COGENTIN) injection 1 mg  1 mg IntraMUSCular BID PRN    zolpidem (AMBIEN) tablet 5 mg  5 mg Oral QHS PRN    acetaminophen (TYLENOL) tablet 650 mg  650 mg Oral Q4H PRN    magnesium hydroxide (MILK OF MAGNESIA) 400 mg/5 mL oral suspension 30 mL  30 mL Oral DAILY PRN    nicotine (NICODERM CQ) 21 mg/24 hr patch 1 Patch  1 Patch TransDERmal DAILY PRN    aspirin chewable tablet 81 mg  81 mg Oral DAILY    famotidine (PEPCID) tablet 20 mg  20 mg Oral DAILY    carvedilol (COREG) tablet 3.125 mg  3.125 mg Oral BID WITH MEALS      SCHEDULED MEDICATIONS:   Current Facility-Administered Medications   Medication Dose Route Frequency    risperiDONE (RisperDAL) tablet 0.5 mg  0.5 mg Oral QHS    memantine (NAMENDA) tablet 5 mg  5 mg Oral BID    dilTIAZem ER (CARDIZEM LA) tablet 120 mg  120 mg Oral DAILY    aspirin chewable tablet 81 mg  81 mg Oral DAILY    famotidine (PEPCID) tablet 20 mg  20 mg Oral DAILY    carvedilol (COREG) tablet 3.125 mg  3.125 mg Oral BID WITH MEALS          ASSESSMENT & PLAN     DIAGNOSES REQUIRING ACTIVE TREATMENT AND MONITORING: (reviewed/updated 6/30/2018)  Patient Active Hospital Problem List:   Dementia (6/17/2018)    Assessment: moderate to severe- disoriented, poor memory and irritable. Need total care. No behavior problem and accepting med    Plan:  Will Moses. - optimize dose- 5 mg bid. [facility]- will need placement  06/24/18: Continue current treatement  Hypotension  A: recent dc from medical unit- BP is improving  P: ct to monitor by IM- cautious with AP- dc Seroquel  6/25/18 continue same medications   6/26/18 continue same medications   6/27/18 continue same medications    6/28- add Risperdal and titrate. Ct with Namenda  6/29- cautious with prn Zyprexa due to unsteady gait- ct to adjust med  6/30- ct with tx    In summary, Mike Diaz, is a 66 y.o.  female who presents with a severe exacerbation of the principal diagnosis of Dementia  Patient's condition is worsening/not improving/not stable . Patient requires continued inpatient hospitalization for further stabilization, safety monitoring and medication management. I will continue to coordinate the provision of individual, milieu, occupational, group, and substance abuse therapies to address target symptoms/diagnoses as deemed appropriate for the individual patient. A coordinated, multidisplinary treatment team round was conducted with the patient (this team consists of the nurse, psychiatric unit pharmcist,  and writer).      Complete current electronic health record for patient has been reviewed today including consultant notes, ancillary staff notes, nurses and psychiatric tech notes. Suicide risk assessment completed and patient deemed to be of low risk for suicide at this time. The following regarding medications was addressed during rounds with patient:   the risks and benefits of the proposed medication. The patient was given the opportunity to ask questions. Informed consent given to the use of the above medications. Will continue to adjust psychiatric and non-psychiatric medications (see above \"medication\" section and orders section for details) as deemed appropriate & based upon diagnoses and response to treatment. I will continue to order blood tests/labs and diagnostic tests as deemed appropriate and review results as they become available (see orders for details and above listed lab/test results). I will order psychiatric records from previous [facility] hospitals to further elucidate the nature of patient's psychopathology and review once available. I will gather additional collateral information from friends, family and o/p treatment team to further elucidate the nature of patient's psychopathology and baselline level of psychiatric functioning. I certify that this patient's inpatient psychiatric hospital services furnished since the previous certification were, and continue to be, required for treatment that could reasonably be expected to improve the patient's condition, or for diagnostic study, and that the patient continues to need, on a daily basis, active treatment furnished directly by or requiring the supervision of inpatient psychiatric facility personnel. In addition, the hospital records show that services furnished were intensive treatment services, admission or related services, or equivalent services.     EXPECTED DISCHARGE DATE/DAY: TBD     DISPOSITION: Home/[facility]       Signed By:   Teresa Riley MD  6/30/2018

## 2018-06-30 NOTE — BH NOTES
Pt is maintained in the unit and her room. Pt is helped with her ADL. pt is meds/meals compliant. Pt has good appetite. Pt is alert but remains confused and disoriented x 3. Pt is oriented to her name only.  1:1 continues for confusion safety.

## 2018-06-30 NOTE — BH NOTES
Pt in the dayroom interacting with peers, coloring and eating HS snack. Pt is a 1 person assist for ambulation. No behavioral concerns at this time. 1:1 while awake continues.

## 2018-06-30 NOTE — BH NOTES
Pt is up in bed talking with staff. Pt is confused but pleasant and verbal. 1:1 continues at this time.

## 2018-06-30 NOTE — BH NOTES
Pt is maintained in the unit and her room. Pt is helped with her ADL. Pt is alert but remains confused and disoriented x 3. Pt is oriented to her name only. 1:1 continues for confusion safety.

## 2018-06-30 NOTE — BH NOTES
Pt is maintained in the dayroom with peers. Pt is helped with her ADL. Pt is alert but remains confused and disoriented x 3. Pt is oriented to her name only. 1:1 continues for confusion safety.

## 2018-07-01 PROCEDURE — 65220000003 HC RM SEMIPRIVATE PSYCH

## 2018-07-01 PROCEDURE — 74011250637 HC RX REV CODE- 250/637: Performed by: PSYCHIATRY & NEUROLOGY

## 2018-07-01 PROCEDURE — 74011250637 HC RX REV CODE- 250/637: Performed by: INTERNAL MEDICINE

## 2018-07-01 RX ADMIN — MEMANTINE 5 MG: 10 TABLET ORAL at 08:10

## 2018-07-01 RX ADMIN — FAMOTIDINE 20 MG: 20 TABLET ORAL at 08:10

## 2018-07-01 RX ADMIN — CARVEDILOL 3.12 MG: 3.12 TABLET, FILM COATED ORAL at 08:10

## 2018-07-01 RX ADMIN — MEMANTINE 5 MG: 10 TABLET ORAL at 16:52

## 2018-07-01 RX ADMIN — ASPIRIN 81 MG 81 MG: 81 TABLET ORAL at 08:10

## 2018-07-01 RX ADMIN — ZOLPIDEM TARTRATE 5 MG: 5 TABLET ORAL at 21:36

## 2018-07-01 RX ADMIN — RISPERIDONE 0.5 MG: 0.25 TABLET ORAL at 21:36

## 2018-07-01 RX ADMIN — CARVEDILOL 3.12 MG: 3.12 TABLET, FILM COATED ORAL at 16:52

## 2018-07-01 RX ADMIN — DILTIAZEM HYDROCHLORIDE 120 MG: 120 TABLET, EXTENDED RELEASE ORAL at 08:10

## 2018-07-01 NOTE — BH NOTES
PSYCHIATRIC PROGRESS NOTE         Patient Name  Tessy Celeste   Date of Birth 1940   [facility] [id number]   Medical Record Number  [id number]      Age  66 y.o. PCP Berenice Spears, MD   Admit date:  6/17/2018    Room Number  321/01  @ [facility]   Date of Service  7/1/2018           E & M PROGRESS NOTE:         HISTORY       CC:  \"confused, memory impairment\"  HISTORY OF PRESENT ILLNESS/INTERVAL HISTORY:  (reviewed/updated 7/1/2018). per initial evaluation: The patient, Tessy Celeste, is a 68 y.o. WHITE [place]  female with a past psychiatric history significant for dementia, who presents at this time with complaints of (and/or evidence of) the following emotional symptoms: memory impairment. Additional symptomatology include disoriented to time and place, poor memory and behavior issues. pt has been incontent and need assistance with ADLs. She is irritable and easily gets loud and labile in mood. She was dc to medical unit for tx of hypotension and now back on U for further tx  The above symptoms have been present for years. These symptoms are of severe severity. These symptoms are constant  in nature. Tessy Celeste presents/reports/evidences the following emotional symptoms today, 7/1/2018:memory impairment. The above symptoms have been present for years. These symptoms are of moderate severity. The symptoms are constant  in nature. Additional symptomatology and features include confused , disoriented to place and time. Pt is now accepting her med and need assistance with ADLs due to incontinence. No aggressive behavior and affect is bright , sleep is improving, pleasant and co operative. Received no prn.  6/25/18 she is compliant with medications and no anger issues like before and she gets confused and disoriented   6/26/18 she is doing better and mood is better and no anger issues and no SI or HI and slept well last night  6/27/18 she is the same and she is not engaging well and she has no anger issues and slept fairly last night but she was sleepy this morning and not engaging    6/28- remains confused with memory impairment. Pt has been hallucinating and paranoid- hiding behind her bed stating a women has been raped and stabbed. Received prn med. Sleep is better  6/29- episode of paranoid and mostly sun downing. Appear pleasant in the morning and no recollection of night time paranoia. unsteady gait due to sedation  6/30- pleasant and confused during the daytime, eating her breakfast. Still has paranoid behavior at night  7/1- slow progress as less sundowning and less paranoid. Pleasantly confused during the day time with no sig behavior problem      SIDE EFFECTS: (reviewed/updated 7/1/2018)  None reported or admitted to. No noted toxicity with use of current med   ALLERGIES:(reviewed/updated 7/1/2018)  Allergies   Allergen Reactions    Cephalosporins Itching    Codeine Hives    Erythromycin Hives    Nubain [Nalbuphine] Hives    Pcn [Penicillins] Hives    Percodan [Oxycodone Hcl-Oxycodone-Asa] Hives    Promethazine Hives    Protonix [Pantoprazole] Diarrhea    Sulfa (Sulfonamide Antibiotics) Hives    Toradol [Ketorolac] Hives    Ultram [Tramadol] Hives    Valium [Diazepam] Hives    Prilosec [Omeprazole] Itching      MEDICATIONS PRIOR TO ADMISSION:(reviewed/updated 7/1/2018)  Prescriptions Prior to Admission   Medication Sig    multivit-minerals/folic acid (WOMEN'S MULTIVITAMIN GUMMIES PO) Take 1 Tab by mouth daily.  QUEtiapine (SEROQUEL) 25 mg tablet Take 12.5 mg by mouth daily.  carvedilol (COREG) 3.125 mg tablet Take 3.125 mg by mouth two (2) times daily (with meals).  diclofenac (VOLTAREN) 1 % gel Apply 2 g to affected area every six (6) hours as needed (arthritis pain).  acetaminophen (TYLENOL) 325 mg tablet Take 650 mg by mouth every eight (8) hours as needed for Pain.     Menthol (ASPERCREME HEAT) 10 % gel by Apply Externally route every eight (8) hours as needed (arthritis).  aspirin 81 mg chewable tablet Take 81 mg by mouth daily. Indications: myocardial infarction prevention    dilTIAZem XR (DILACOR XR) 120 mg XR capsule Take 120 mg by mouth daily. Indications: hypertension, VENTRICULAR RATE CONTROL IN ATRIAL FIBRILLATION    furosemide (LASIX) 20 mg tablet Take 20 mg by mouth daily as needed. Indications: Edema, hypertension    loratadine (CLARITIN) 10 mg tablet Take 10 mg by mouth daily as needed. Indications: Allergic Rhinitis, SNEEZING    famotidine (PEPCID) 20 mg tablet Take 20 mg by mouth daily. Indications: Heartburn    QUEtiapine (SEROQUEL) 25 mg tablet Take 25 mg by mouth nightly. Indications: Generalized Anxiety Disorder      PAST MEDICAL HISTORY: Past medical history from the initial psychiatric evaluation has been reviewed (reviewed/updated 7/1/2018) with no additional updates (I asked patient and no additional past medical history provided). No past medical history on file. No past surgical history on file. SOCIAL HISTORY: Social history from the initial psychiatric evaluation has been reviewed (reviewed/updated 7/1/2018) with no additional updates (I asked patient and no additional social history provided). Social History     Social History    Marital status: SINGLE     Spouse name: N/A    Number of children: N/A    Years of education: N/A     Occupational History    Not on file. Social History Main Topics    Smoking status: Not on file    Smokeless tobacco: Not on file    Alcohol use Not on file    Drug use: Not on file    Sexual activity: Not on file     Other Topics Concern    Not on file     Social History Narrative      FAMILY HISTORY: Family history from the initial psychiatric evaluation has been reviewed (reviewed/updated 7/1/2018) with no additional updates (I asked patient and no additional family history provided). No family history on file.     REVIEW OF SYSTEMS: (reviewed/updated 7/1/2018)  Appetite:poor   Sleep: decreased more than normal   All other Review of Systems: Psychological ROS: positive for - behavioral disorder, concentration difficulties, disorientation and memory difficulties         [facility] (MSE):    MSE FINDINGS ARE WITHIN NORMAL LIMITS (WNL) UNLESS OTHERWISE STATED BELOW. ( ALL OF THE BELOW CATEGORIES OF THE MSE HAVE BEEN REVIEWED (reviewed 7/1/2018) AND UPDATED AS DEEMED APPROPRIATE )  General Presentation age appropriate and casually dressed, unreliable and vague   Orientation disorganized   Vital Signs  See below (reviewed 7/1/2018); Vital Signs (BP, Pulse, & Temp) are within normal limits if not listed below.    Gait and Station Stable/steady, no ataxia   Musculoskeletal System No extrapyramidal symptoms (EPS); no abnormal muscular movements or Tardive Dyskinesia (TD); muscle strength and tone are within normal limits   Language No aphasia or dysarthria   Speech:  hypoverbal   Thought Processes illogical; slow rate of thoughts; poor abstract reasoning/computation   Thought Associations tangential   Thought Content free of delusions, free of hallucinations and preoccupations   Suicidal Ideations none   Homicidal Ideations none   Mood:  Less anxious   Affect:  anxious and increased in intensity   Memory recent  impaired   Memory remote:  impaired   Concentration/Attention:  distractable   Fund of Knowledge below average   Insight:  poor   Reliability poor   Judgment:  poor          VITALS:     Patient Vitals for the past 24 hrs:   Pulse BP   07/01/18 1611 60 109/49   07/01/18 0810 61 135/69     Wt Readings from Last 3 Encounters:   06/18/18 54.6 kg (120 lb 4.8 oz)     Temp Readings from Last 3 Encounters:   06/30/18 98 °F (36.7 °C)   06/17/18 97.6 °F (36.4 °C)   06/15/18 97.6 °F (36.4 °C)     BP Readings from Last 3 Encounters:   07/01/18 109/49   06/17/18 102/47   06/16/18 99/55     Pulse Readings from Last 3 Encounters:   07/01/18 60   06/17/18 70   06/16/18 81 DATA     LABORATORY DATA:(reviewed/updated 7/1/2018)  No results found for this or any previous visit (from the past 24 hour(s)). No results found for: VALF2, VALAC, VALP, VALPR, DS6, CRBAM, CRBAMP, CARB2, XCRBAM  No results found for: LITHM   RADIOLOGY REPORTS:(reviewed/updated 7/1/2018)  Xr Chest Port    Result Date: 6/17/2018  EXAM:  XR CHEST PORT INDICATION:  Chest Pain COMPARISON:  None. FINDINGS: A portable AP radiograph of the chest was obtained at 720 hours. The patient is on a cardiac monitor. There is minor right basilar atelectasis. There is question of a vague right perihilar airspace process. There is slight diffuse interstitial prominence. Heart size is borderline enlarged. Patient status post median sternotomy. IMPRESSION: 1. There is suggestion of a vague right perihilar airspace process and follow-up studies would be helpful to assess for interval change. There is minor right basilar atelectasis.           MEDICATIONS     ALL MEDICATIONS:   Current Facility-Administered Medications   Medication Dose Route Frequency    risperiDONE (RisperDAL) tablet 0.5 mg  0.5 mg Oral QHS    memantine (NAMENDA) tablet 5 mg  5 mg Oral BID    dilTIAZem ER (CARDIZEM LA) tablet 120 mg  120 mg Oral DAILY    ziprasidone (GEODON) 10 mg in sterile water (preservative free) 0.5 mL injection  10 mg IntraMUSCular BID PRN    benztropine (COGENTIN) tablet 1 mg  1 mg Oral BID PRN    benztropine (COGENTIN) injection 1 mg  1 mg IntraMUSCular BID PRN    zolpidem (AMBIEN) tablet 5 mg  5 mg Oral QHS PRN    acetaminophen (TYLENOL) tablet 650 mg  650 mg Oral Q4H PRN    magnesium hydroxide (MILK OF MAGNESIA) 400 mg/5 mL oral suspension 30 mL  30 mL Oral DAILY PRN    nicotine (NICODERM CQ) 21 mg/24 hr patch 1 Patch  1 Patch TransDERmal DAILY PRN    aspirin chewable tablet 81 mg  81 mg Oral DAILY    famotidine (PEPCID) tablet 20 mg  20 mg Oral DAILY    carvedilol (COREG) tablet 3.125 mg  3.125 mg Oral BID WITH MEALS      SCHEDULED MEDICATIONS:   Current Facility-Administered Medications   Medication Dose Route Frequency    risperiDONE (RisperDAL) tablet 0.5 mg  0.5 mg Oral QHS    memantine (NAMENDA) tablet 5 mg  5 mg Oral BID    dilTIAZem ER (CARDIZEM LA) tablet 120 mg  120 mg Oral DAILY    aspirin chewable tablet 81 mg  81 mg Oral DAILY    famotidine (PEPCID) tablet 20 mg  20 mg Oral DAILY    carvedilol (COREG) tablet 3.125 mg  3.125 mg Oral BID WITH MEALS          ASSESSMENT & PLAN     DIAGNOSES REQUIRING ACTIVE TREATMENT AND MONITORING: (reviewed/updated 7/1/2018)  Patient Active Hospital Problem List:   Dementia (6/17/2018)    Assessment: moderate to severe- disoriented, poor memory and irritable. Need total care. No behavior problem and accepting med    Plan:  Ryland Burden. - optimize dose- 5 mg bid. Pt has lost her group home- will need placement  06/24/18: Continue current treatement  Hypotension  A: recent dc from medical unit- BP is improving  P: ct to monitor by IM- cautious with AP- dc Seroquel  6/25/18 continue same medications   6/26/18 continue same medications   6/27/18 continue same medications    6/28- add Risperdal and titrate. Ct with Namenda  6/29- cautious with prn Zyprexa due to unsteady gait- ct to adjust med  6/30- ct with tx  7/1- ct with tx plan  In summary, Bina Self, is a 66 y.o.  female who presents with a severe exacerbation of the principal diagnosis of Dementia  Patient's condition is worsening/not improving/not stable . Patient requires continued inpatient hospitalization for further stabilization, safety monitoring and medication management. I will continue to coordinate the provision of individual, milieu, occupational, group, and substance abuse therapies to address target symptoms/diagnoses as deemed appropriate for the individual patient.   A coordinated, multidisplinary treatment team round was conducted with the patient (this team consists of the nurse, psychiatric unit pharmcist,  and writer). Complete current electronic health record for patient has been reviewed today including consultant notes, ancillary staff notes, nurses and psychiatric tech notes. Suicide risk assessment completed and patient deemed to be of low risk for suicide at this time. The following regarding medications was addressed during rounds with patient:   the risks and benefits of the proposed medication. The patient was given the opportunity to ask questions. Informed consent given to the use of the above medications. Will continue to adjust psychiatric and non-psychiatric medications (see above \"medication\" section and orders section for details) as deemed appropriate & based upon diagnoses and response to treatment. I will continue to order blood tests/labs and diagnostic tests as deemed appropriate and review results as they become available (see orders for details and above listed lab/test results). I will order psychiatric records from previous [facility] hospitals to further elucidate the nature of patient's psychopathology and review once available. I will gather additional collateral information from friends, family and o/p treatment team to further elucidate the nature of patient's psychopathology and baselline level of psychiatric functioning. I certify that this patient's inpatient psychiatric hospital services furnished since the previous certification were, and continue to be, required for treatment that could reasonably be expected to improve the patient's condition, or for diagnostic study, and that the patient continues to need, on a daily basis, active treatment furnished directly by or requiring the supervision of inpatient psychiatric facility personnel. In addition, the hospital records show that services furnished were intensive treatment services, admission or related services, or equivalent services.     EXPECTED DISCHARGE DATE/DAY: TBD DISPOSITION: Home/[facility]       Signed By:   Sriram Small MD  7/1/2018

## 2018-07-01 NOTE — BH NOTES
Problem: Altered Thought Process (Adult/Pediatric)  Goal: *STG: Complies with medication therapy  Outcome: Progressing Towards Goal  Pt is visible in the milieu this morning. Pt has been up with assistance with her ADL. Pt presents as cooperative but confused and disoriented. Pt is meds/meals compliant. Pt is eating better and her sleep is improving per night shift report of 7 hours. Pt is off 1:1 presently per Dr. Lida Sims. Will continue to monitor q 15 for safety, mood and behavior changes.

## 2018-07-01 NOTE — PROGRESS NOTES
Problem: Dementia-BEHAVIORAL HEALTH (Adult/Pediatric)  Goal: *STG: Remains safe in hospital  Outcome: Progressing Towards Goal  Pt is alert/oriented x4. Calm and Cooperative. Pt isolative to room. Mood is good. Meds/meal compliant. No PRNs given this shift. No behavioral issues. Denies suicidal and homicidal ideations. Denies auditory and visual hallucinations. Will continue to monitor pt with q15 min rounds for safety.

## 2018-07-01 NOTE — BH NOTES
Pt's /49 at dinner time. Spoke to Dr. Iraj Eisenberg if coreg 3.15 mg should be held. Dr. Iraj Eisenberg said it is OK to give.

## 2018-07-02 PROCEDURE — 65220000003 HC RM SEMIPRIVATE PSYCH

## 2018-07-02 PROCEDURE — 74011250637 HC RX REV CODE- 250/637: Performed by: PSYCHIATRY & NEUROLOGY

## 2018-07-02 PROCEDURE — 74011250637 HC RX REV CODE- 250/637: Performed by: INTERNAL MEDICINE

## 2018-07-02 RX ORDER — MEMANTINE HYDROCHLORIDE 10 MG/1
10 TABLET ORAL 2 TIMES DAILY
Status: DISCONTINUED | OUTPATIENT
Start: 2018-07-02 | End: 2018-07-30 | Stop reason: HOSPADM

## 2018-07-02 RX ADMIN — MEMANTINE 10 MG: 10 TABLET ORAL at 18:49

## 2018-07-02 RX ADMIN — DILTIAZEM HYDROCHLORIDE 120 MG: 120 TABLET, EXTENDED RELEASE ORAL at 09:29

## 2018-07-02 RX ADMIN — FAMOTIDINE 20 MG: 20 TABLET ORAL at 09:29

## 2018-07-02 RX ADMIN — ZOLPIDEM TARTRATE 5 MG: 5 TABLET ORAL at 21:30

## 2018-07-02 RX ADMIN — MEMANTINE 5 MG: 10 TABLET ORAL at 09:30

## 2018-07-02 RX ADMIN — RISPERIDONE 0.5 MG: 0.25 TABLET ORAL at 21:27

## 2018-07-02 RX ADMIN — ASPIRIN 81 MG 81 MG: 81 TABLET ORAL at 09:30

## 2018-07-02 RX ADMIN — CARVEDILOL 3.12 MG: 3.12 TABLET, FILM COATED ORAL at 09:30

## 2018-07-02 NOTE — BH NOTES
PSYCHIATRIC PROGRESS NOTE         Patient Name  Una Go   Date of Birth 1940   [facility] [id number]   Medical Record Number  [id number]      Age  66 y.o. PCP Berenice Spears, MD   Admit date:  6/17/2018    Room Number  321/01  @ [facility]   Date of Service  7/2/2018           E & M PROGRESS NOTE:         HISTORY       CC:  \"confused, memory impairment\"  HISTORY OF PRESENT ILLNESS/INTERVAL HISTORY:  (reviewed/updated 7/2/2018). per initial evaluation: The patient, Una Go, is a 68 y.o. WHITE [place]  female with a past psychiatric history significant for dementia, who presents at this time with complaints of (and/or evidence of) the following emotional symptoms: memory impairment. Additional symptomatology include disoriented to time and place, poor memory and behavior issues. pt has been incontent and need assistance with ADLs. She is irritable and easily gets loud and labile in mood. She was dc to medical unit for tx of hypotension and now back on U for further tx  The above symptoms have been present for years. These symptoms are of severe severity. These symptoms are constant  in nature. Una Go presents/reports/evidences the following emotional symptoms today, 7/2/2018:memory impairment. The above symptoms have been present for years. These symptoms are of moderate severity. The symptoms are constant  in nature. Additional symptomatology and features include confused , disoriented to place and time. Pt is now accepting her med and need assistance with ADLs due to incontinence. No aggressive behavior and affect is bright , sleep is improving, pleasant and co operative. Received no prn.  6/25/18 she is compliant with medications and no anger issues like before and she gets confused and disoriented   6/26/18 she is doing better and mood is better and no anger issues and no SI or HI and slept well last night  6/27/18 she is the same and she is not engaging well and she has no anger issues and slept fairly last night but she was sleepy this morning and not engaging    6/28- remains confused with memory impairment. Pt has been hallucinating and paranoid- hiding behind her bed stating a women has been raped and stabbed. Received prn med. Sleep is better  6/29- episode of paranoid and mostly sun downing. Appear pleasant in the morning and no recollection of night time paranoia. unsteady gait due to sedation  6/30- pleasant and confused during the daytime, eating her breakfast. Still has paranoid behavior at night  7/1- slow progress as less sundowning and less paranoid. Pleasantly confused during the day time with no sig behavior problem  7/2- confused and disoriented with poor memory. Sun downing and worse in the evening with paranoia. SIDE EFFECTS: (reviewed/updated 7/2/2018)  None reported or admitted to. No noted toxicity with use of current med   ALLERGIES:(reviewed/updated 7/2/2018)  Allergies   Allergen Reactions    Cephalosporins Itching    Codeine Hives    Erythromycin Hives    Nubain [Nalbuphine] Hives    Pcn [Penicillins] Hives    Percodan [Oxycodone Hcl-Oxycodone-Asa] Hives    Promethazine Hives    Protonix [Pantoprazole] Diarrhea    Sulfa (Sulfonamide Antibiotics) Hives    Toradol [Ketorolac] Hives    Ultram [Tramadol] Hives    Valium [Diazepam] Hives    Prilosec [Omeprazole] Itching      MEDICATIONS PRIOR TO ADMISSION:(reviewed/updated 7/2/2018)  Prescriptions Prior to Admission   Medication Sig    multivit-minerals/folic acid (WOMEN'S MULTIVITAMIN GUMMIES PO) Take 1 Tab by mouth daily.  QUEtiapine (SEROQUEL) 25 mg tablet Take 12.5 mg by mouth daily.  carvedilol (COREG) 3.125 mg tablet Take 3.125 mg by mouth two (2) times daily (with meals).  diclofenac (VOLTAREN) 1 % gel Apply 2 g to affected area every six (6) hours as needed (arthritis pain).     acetaminophen (TYLENOL) 325 mg tablet Take 650 mg by mouth every eight (8) hours as needed for Pain.  Menthol (ASPERCREME HEAT) 10 % gel by Apply Externally route every eight (8) hours as needed (arthritis).  aspirin 81 mg chewable tablet Take 81 mg by mouth daily. Indications: myocardial infarction prevention    dilTIAZem XR (DILACOR XR) 120 mg XR capsule Take 120 mg by mouth daily. Indications: hypertension, VENTRICULAR RATE CONTROL IN ATRIAL FIBRILLATION    furosemide (LASIX) 20 mg tablet Take 20 mg by mouth daily as needed. Indications: Edema, hypertension    loratadine (CLARITIN) 10 mg tablet Take 10 mg by mouth daily as needed. Indications: Allergic Rhinitis, SNEEZING    famotidine (PEPCID) 20 mg tablet Take 20 mg by mouth daily. Indications: Heartburn    QUEtiapine (SEROQUEL) 25 mg tablet Take 25 mg by mouth nightly. Indications: Generalized Anxiety Disorder      PAST MEDICAL HISTORY: Past medical history from the initial psychiatric evaluation has been reviewed (reviewed/updated 7/2/2018) with no additional updates (I asked patient and no additional past medical history provided). No past medical history on file. No past surgical history on file. SOCIAL HISTORY: Social history from the initial psychiatric evaluation has been reviewed (reviewed/updated 7/2/2018) with no additional updates (I asked patient and no additional social history provided). Social History     Social History    Marital status: SINGLE     Spouse name: N/A    Number of children: N/A    Years of education: N/A     Occupational History    Not on file. Social History Main Topics    Smoking status: Not on file    Smokeless tobacco: Not on file    Alcohol use Not on file    Drug use: Not on file    Sexual activity: Not on file     Other Topics Concern    Not on file     Social History Narrative      FAMILY HISTORY: Family history from the initial psychiatric evaluation has been reviewed (reviewed/updated 7/2/2018) with no additional updates (I asked patient and no additional family history provided).  No family history on file. REVIEW OF SYSTEMS: (reviewed/updated 7/2/2018)  Appetite:poor   Sleep: decreased more than normal   All other Review of Systems: Psychological ROS: positive for - behavioral disorder, concentration difficulties, disorientation and memory difficulties         [facility] (MSE):    MSE FINDINGS ARE WITHIN NORMAL LIMITS (WNL) UNLESS OTHERWISE STATED BELOW. ( ALL OF THE BELOW CATEGORIES OF THE MSE HAVE BEEN REVIEWED (reviewed 7/2/2018) AND UPDATED AS DEEMED APPROPRIATE )  General Presentation age appropriate and casually dressed, unreliable and vague   Orientation disorganized   Vital Signs  See below (reviewed 7/2/2018); Vital Signs (BP, Pulse, & Temp) are within normal limits if not listed below.    Gait and Station Stable/steady, no ataxia   Musculoskeletal System No extrapyramidal symptoms (EPS); no abnormal muscular movements or Tardive Dyskinesia (TD); muscle strength and tone are within normal limits   Language No aphasia or dysarthria   Speech:  hypoverbal   Thought Processes illogical; slow rate of thoughts; poor abstract reasoning/computation   Thought Associations tangential   Thought Content free of delusions, free of hallucinations and preoccupations   Suicidal Ideations none   Homicidal Ideations none   Mood:  Less anxious   Affect:  anxious and increased in intensity   Memory recent  impaired   Memory remote:  impaired   Concentration/Attention:  distractable   Fund of Knowledge below average   Insight:  poor   Reliability poor   Judgment:  poor          VITALS:     Patient Vitals for the past 24 hrs:   Pulse BP   07/02/18 0929 (!) 58 125/51   07/01/18 1611 60 109/49     Wt Readings from Last 3 Encounters:   06/18/18 54.6 kg (120 lb 4.8 oz)     Temp Readings from Last 3 Encounters:   06/30/18 98 °F (36.7 °C)   06/17/18 97.6 °F (36.4 °C)   06/15/18 97.6 °F (36.4 °C)     BP Readings from Last 3 Encounters:   07/02/18 125/51   06/17/18 102/47 06/16/18 99/55     Pulse Readings from Last 3 Encounters:   07/02/18 (!) 58   06/17/18 70   06/16/18 81            DATA     LABORATORY DATA:(reviewed/updated 7/2/2018)  No results found for this or any previous visit (from the past 24 hour(s)). No results found for: VALF2, VALAC, VALP, VALPR, DS6, CRBAM, CRBAMP, CARB2, XCRBAM  No results found for: LITHM   RADIOLOGY REPORTS:(reviewed/updated 7/2/2018)  Xr Chest Port    Result Date: 6/17/2018  EXAM:  XR CHEST PORT INDICATION:  Chest Pain COMPARISON:  None. FINDINGS: A portable AP radiograph of the chest was obtained at 720 hours. The patient is on a cardiac monitor. There is minor right basilar atelectasis. There is question of a vague right perihilar airspace process. There is slight diffuse interstitial prominence. Heart size is borderline enlarged. Patient status post median sternotomy. IMPRESSION: 1. There is suggestion of a vague right perihilar airspace process and follow-up studies would be helpful to assess for interval change. There is minor right basilar atelectasis.           MEDICATIONS     ALL MEDICATIONS:   Current Facility-Administered Medications   Medication Dose Route Frequency    memantine (NAMENDA) tablet 10 mg  10 mg Oral BID    risperiDONE (RisperDAL) tablet 0.5 mg  0.5 mg Oral QHS    dilTIAZem ER (CARDIZEM LA) tablet 120 mg  120 mg Oral DAILY    ziprasidone (GEODON) 10 mg in sterile water (preservative free) 0.5 mL injection  10 mg IntraMUSCular BID PRN    benztropine (COGENTIN) tablet 1 mg  1 mg Oral BID PRN    benztropine (COGENTIN) injection 1 mg  1 mg IntraMUSCular BID PRN    zolpidem (AMBIEN) tablet 5 mg  5 mg Oral QHS PRN    acetaminophen (TYLENOL) tablet 650 mg  650 mg Oral Q4H PRN    magnesium hydroxide (MILK OF MAGNESIA) 400 mg/5 mL oral suspension 30 mL  30 mL Oral DAILY PRN    nicotine (NICODERM CQ) 21 mg/24 hr patch 1 Patch  1 Patch TransDERmal DAILY PRN    aspirin chewable tablet 81 mg  81 mg Oral DAILY    famotidine (PEPCID) tablet 20 mg  20 mg Oral DAILY    carvedilol (COREG) tablet 3.125 mg  3.125 mg Oral BID WITH MEALS      SCHEDULED MEDICATIONS:   Current Facility-Administered Medications   Medication Dose Route Frequency    memantine (NAMENDA) tablet 10 mg  10 mg Oral BID    risperiDONE (RisperDAL) tablet 0.5 mg  0.5 mg Oral QHS    dilTIAZem ER (CARDIZEM LA) tablet 120 mg  120 mg Oral DAILY    aspirin chewable tablet 81 mg  81 mg Oral DAILY    famotidine (PEPCID) tablet 20 mg  20 mg Oral DAILY    carvedilol (COREG) tablet 3.125 mg  3.125 mg Oral BID WITH MEALS          ASSESSMENT & PLAN     DIAGNOSES REQUIRING ACTIVE TREATMENT AND MONITORING: (reviewed/updated 7/2/2018)  Patient Active Hospital Problem List:   Dementia (6/17/2018)    Assessment: moderate to severe- disoriented, poor memory and irritable. Need total care. No behavior problem and accepting med    Plan:  Grey Lund. - optimize dose- 5 mg bid. Pt has lost her VICENTE- will need placement  06/24/18: Continue current treatement  Hypotension  A: recent dc from medical unit- BP is improving  P: ct to monitor by IM- cautious with AP- dc Seroquel  6/25/18 continue same medications   6/26/18 continue same medications   6/27/18 continue same medications    6/28- add Risperdal and titrate. Ct with Namenda  6/29- cautious with prn Zyprexa due to unsteady gait- ct to adjust med  6/30- ct with tx  7/1- ct with tx plan  7/2- titrate Namenda  In summary, Stephanie Monsalve, is a 66 y.o.  female who presents with a severe exacerbation of the principal diagnosis of Dementia  Patient's condition is worsening/not improving/not stable . Patient requires continued inpatient hospitalization for further stabilization, safety monitoring and medication management. I will continue to coordinate the provision of individual, milieu, occupational, group, and substance abuse therapies to address target symptoms/diagnoses as deemed appropriate for the individual patient.   A coordinated, multidisplinary treatment team round was conducted with the patient (this team consists of the nurse, psychiatric unit pharmcist,  and writer). Complete current electronic health record for patient has been reviewed today including consultant notes, ancillary staff notes, nurses and psychiatric tech notes. Suicide risk assessment completed and patient deemed to be of low risk for suicide at this time. The following regarding medications was addressed during rounds with patient:   the risks and benefits of the proposed medication. The patient was given the opportunity to ask questions. Informed consent given to the use of the above medications. Will continue to adjust psychiatric and non-psychiatric medications (see above \"medication\" section and orders section for details) as deemed appropriate & based upon diagnoses and response to treatment. I will continue to order blood tests/labs and diagnostic tests as deemed appropriate and review results as they become available (see orders for details and above listed lab/test results). I will order psychiatric records from previous [facility] hospitals to further elucidate the nature of patient's psychopathology and review once available. I will gather additional collateral information from friends, family and o/p treatment team to further elucidate the nature of patient's psychopathology and baselline level of psychiatric functioning. I certify that this patient's inpatient psychiatric hospital services furnished since the previous certification were, and continue to be, required for treatment that could reasonably be expected to improve the patient's condition, or for diagnostic study, and that the patient continues to need, on a daily basis, active treatment furnished directly by or requiring the supervision of inpatient psychiatric facility personnel.  In addition, the hospital records show that services furnished were intensive treatment services, admission or related services, or equivalent services.     EXPECTED DISCHARGE DATE/DAY: TBD     DISPOSITION: Home/V[facility]ENTE       Signed By:   Baldev Patel MD  7/2/2018

## 2018-07-02 NOTE — BH NOTES
7p-11p Pt is visible in milieu, social with peers. Pt remains confused and delusional. Pt is medication compliant. Pt forgets she ate minutes after she finished eating and requested more food, crackers given. Will continue to monitor Q 15 minutes for safety.

## 2018-07-02 NOTE — BH NOTES
GROUP THERAPY PROGRESS NOTE    The patient Morenita lew 66 y.o. female is participating in [employer]. Group time: 1 hour    Personal goal for participation: To concentrate on selected task    Goal orientation: social    Group therapy participation: active    Therapeutic interventions reviewed and discussed: Crafts, games, music    Impression of participation: The patient was attentive.     Chucho Lance  7/2/2018  5:54 PM

## 2018-07-02 NOTE — PROGRESS NOTES
Patient alert and oriented to person only. Calm and cooperative with staff. Visible in milieu with encouragement from staff but keeps to self. Paranoid and delusional; believes that a women was murdered in her room and that the body is lying in the spare bed. Patient denies thoughts of harming self or others. Denies auditory, visual and tactile hallucinations. Compliant with meals and medications. Will continue to monitor and assess.

## 2018-07-03 PROCEDURE — 65220000003 HC RM SEMIPRIVATE PSYCH

## 2018-07-03 PROCEDURE — 74011250637 HC RX REV CODE- 250/637: Performed by: PSYCHIATRY & NEUROLOGY

## 2018-07-03 PROCEDURE — 74011250637 HC RX REV CODE- 250/637: Performed by: INTERNAL MEDICINE

## 2018-07-03 RX ORDER — RISPERIDONE 0.25 MG/1
0.75 TABLET, FILM COATED ORAL
Status: DISCONTINUED | OUTPATIENT
Start: 2018-07-03 | End: 2018-07-05

## 2018-07-03 RX ADMIN — MEMANTINE 10 MG: 10 TABLET ORAL at 18:00

## 2018-07-03 RX ADMIN — CARVEDILOL 3.12 MG: 3.12 TABLET, FILM COATED ORAL at 07:59

## 2018-07-03 RX ADMIN — MEMANTINE 10 MG: 10 TABLET ORAL at 07:58

## 2018-07-03 RX ADMIN — ASPIRIN 81 MG 81 MG: 81 TABLET ORAL at 07:59

## 2018-07-03 RX ADMIN — CARVEDILOL 3.12 MG: 3.12 TABLET, FILM COATED ORAL at 18:00

## 2018-07-03 RX ADMIN — DILTIAZEM HYDROCHLORIDE 120 MG: 120 TABLET, EXTENDED RELEASE ORAL at 07:59

## 2018-07-03 RX ADMIN — FAMOTIDINE 20 MG: 20 TABLET ORAL at 07:58

## 2018-07-03 RX ADMIN — RISPERIDONE 0.75 MG: 0.25 TABLET ORAL at 21:29

## 2018-07-03 NOTE — BH NOTES
Pt is visible in milieu, watches tv in dayroom. Pt has little interaction with peers unless they approach her. Pt still fixed on delusion that a man came in her room and killed a women in the bed next to hers and she is afraid to go to her room. Pt reassured that she is safe and we will keep a close eye on her all night long. Will continue Q 15 minute monitoring for safety.

## 2018-07-03 NOTE — BH NOTES
GROUP THERAPY PROGRESS NOTE    The patient Mike lew 66 y.o. female is participating in [employer]. Group time: 1 hour    Personal goal for participation: To concentrate on selected task    Goal orientation: social    Group therapy participation: active    Therapeutic interventions reviewed and discussed: Crafts, games, music    Impression of participation: The patient was attentive.     Hobert Border  7/3/2018  4:42 PM

## 2018-07-03 NOTE — PROGRESS NOTES
Problem: Dementia-BEHAVIORAL HEALTH (Adult/Pediatric)  Goal: *STG: Remains safe in hospital  Outcome: Progressing Towards Goal  Pt slept 7 hours. Pt had uneventful night and required no PRN's. Pt had no complaints or signs of distress throughout night. Respirations were unlabored. Continuing to monitor with q15 min rounds for safety.

## 2018-07-03 NOTE — BH NOTES
GROUP THERAPY PROGRESS NOTE    Jose Alfredo Dobbins is participating in [employer].      Group time: 30 minutes    Personal goal for participation:  Unit orientation    Goal orientation: Community    Group therapy participation: Pt did not attend    Therapeutic interventions reviewed and discussed: Yes    Impression of participation: N/A

## 2018-07-03 NOTE — BH NOTES
PSYCHIATRIC PROGRESS NOTE         Patient Name  Shon Benitez   Date of Birth 1940   [facility] [id number]   Medical Record Number  [id number]      Age  66 y.o. PCP Berenice Spears, MD   Admit date:  6/17/2018    Room Number  321/01  @ [facility]   Date of Service  7/3/2018           E & M PROGRESS NOTE:         HISTORY       CC:  \"confused, memory impairment\"  HISTORY OF PRESENT ILLNESS/INTERVAL HISTORY:  (reviewed/updated 7/3/2018). per initial evaluation: The patient, Shon Benitez, is a 68 y.o. WHITE [place]  female with a past psychiatric history significant for dementia, who presents at this time with complaints of (and/or evidence of) the following emotional symptoms: memory impairment. Additional symptomatology include disoriented to time and place, poor memory and behavior issues. pt has been incontent and need assistance with ADLs. She is irritable and easily gets loud and labile in mood. She was dc to medical unit for tx of hypotension and now back on [facility] for further tx  The above symptoms have been present for years. These symptoms are of severe severity. These symptoms are constant  in nature. Shon Benitez presents/reports/evidences the following emotional symptoms today, 7/3/2018:memory impairment. The above symptoms have been present for years. These symptoms are of moderate severity. The symptoms are constant  in nature. Additional symptomatology and features include confused , disoriented to place and time. Pt is now accepting her med and need assistance with ADLs due to incontinence. No aggressive behavior and affect is bright , sleep is improving, pleasant and co operative. Received no prn.  6/25/18 she is compliant with medications and no anger issues like before and she gets confused and disoriented   6/26/18 she is doing better and mood is better and no anger issues and no SI or HI and slept well last night  6/27/18 she is the same and she is not engaging well and she has no anger issues and slept fairly last night but she was sleepy this morning and not engaging    6/28- remains confused with memory impairment. Pt has been hallucinating and paranoid- hiding behind her bed stating a women has been raped and stabbed. Received prn med. Sleep is better  6/29- episode of paranoid and mostly sun downing. Appear pleasant in the morning and no recollection of night time paranoia. unsteady gait due to sedation  6/30- pleasant and confused during the daytime, eating her breakfast. Still has paranoid behavior at night  7/1- slow progress as less sundowning and less paranoid. Pleasantly confused during the day time with no sig behavior problem  7/2- confused and disoriented with poor memory. Sun downing and worse in the evening with paranoia. 7/3- paranoid at night about a man coming to her room and killing. Fearful but pt is calm, pleasantly confused during daytime. Accepting med and no aggressive behavior      SIDE EFFECTS: (reviewed/updated 7/3/2018)  None reported or admitted to. No noted toxicity with use of current med   ALLERGIES:(reviewed/updated 7/3/2018)  Allergies   Allergen Reactions    Cephalosporins Itching    Codeine Hives    Erythromycin Hives    Nubain [Nalbuphine] Hives    Pcn [Penicillins] Hives    Percodan [Oxycodone Hcl-Oxycodone-Asa] Hives    Promethazine Hives    Protonix [Pantoprazole] Diarrhea    Sulfa (Sulfonamide Antibiotics) Hives    Toradol [Ketorolac] Hives    Ultram [Tramadol] Hives    Valium [Diazepam] Hives    Prilosec [Omeprazole] Itching      MEDICATIONS PRIOR TO ADMISSION:(reviewed/updated 7/3/2018)  Prescriptions Prior to Admission   Medication Sig    multivit-minerals/folic acid (WOMEN'S MULTIVITAMIN GUMMIES PO) Take 1 Tab by mouth daily.  QUEtiapine (SEROQUEL) 25 mg tablet Take 12.5 mg by mouth daily.  carvedilol (COREG) 3.125 mg tablet Take 3.125 mg by mouth two (2) times daily (with meals).     diclofenac (VOLTAREN) 1 % gel Apply 2 g to affected area every six (6) hours as needed (arthritis pain).  acetaminophen (TYLENOL) 325 mg tablet Take 650 mg by mouth every eight (8) hours as needed for Pain.  Menthol (ASPERCREME HEAT) 10 % gel by Apply Externally route every eight (8) hours as needed (arthritis).  aspirin 81 mg chewable tablet Take 81 mg by mouth daily. Indications: myocardial infarction prevention    dilTIAZem XR (DILACOR XR) 120 mg XR capsule Take 120 mg by mouth daily. Indications: hypertension, VENTRICULAR RATE CONTROL IN ATRIAL FIBRILLATION    furosemide (LASIX) 20 mg tablet Take 20 mg by mouth daily as needed. Indications: Edema, hypertension    loratadine (CLARITIN) 10 mg tablet Take 10 mg by mouth daily as needed. Indications: Allergic Rhinitis, SNEEZING    famotidine (PEPCID) 20 mg tablet Take 20 mg by mouth daily. Indications: Heartburn    QUEtiapine (SEROQUEL) 25 mg tablet Take 25 mg by mouth nightly. Indications: Generalized Anxiety Disorder      PAST MEDICAL HISTORY: Past medical history from the initial psychiatric evaluation has been reviewed (reviewed/updated 7/3/2018) with no additional updates (I asked patient and no additional past medical history provided). No past medical history on file. No past surgical history on file. SOCIAL HISTORY: Social history from the initial psychiatric evaluation has been reviewed (reviewed/updated 7/3/2018) with no additional updates (I asked patient and no additional social history provided). Social History     Social History    Marital status: SINGLE     Spouse name: N/A    Number of children: N/A    Years of education: N/A     Occupational History    Not on file.      Social History Main Topics    Smoking status: Not on file    Smokeless tobacco: Not on file    Alcohol use Not on file    Drug use: Not on file    Sexual activity: Not on file     Other Topics Concern    Not on file     Social History Narrative      FAMILY HISTORY: Family history from the initial psychiatric evaluation has been reviewed (reviewed/updated 7/3/2018) with no additional updates (I asked patient and no additional family history provided). No family history on file. REVIEW OF SYSTEMS: (reviewed/updated 7/3/2018)  Appetite:poor   Sleep: decreased more than normal   All other Review of Systems: Psychological ROS: positive for - behavioral disorder, concentration difficulties, disorientation and memory difficulties         [facility] (MSE):    MSE FINDINGS ARE WITHIN NORMAL LIMITS (WNL) UNLESS OTHERWISE STATED BELOW. ( ALL OF THE BELOW CATEGORIES OF THE MSE HAVE BEEN REVIEWED (reviewed 7/3/2018) AND UPDATED AS DEEMED APPROPRIATE )  General Presentation age appropriate and casually dressed, unreliable and vague   Orientation disorganized   Vital Signs  See below (reviewed 7/3/2018); Vital Signs (BP, Pulse, & Temp) are within normal limits if not listed below.    Gait and Station Stable/steady, no ataxia   Musculoskeletal System No extrapyramidal symptoms (EPS); no abnormal muscular movements or Tardive Dyskinesia (TD); muscle strength and tone are within normal limits   Language No aphasia or dysarthria   Speech:  hypoverbal   Thought Processes illogical; slow rate of thoughts; poor abstract reasoning/computation   Thought Associations tangential   Thought Content free of delusions, free of hallucinations and preoccupations   Suicidal Ideations none   Homicidal Ideations none   Mood:  Less anxious   Affect:  anxious and increased in intensity   Memory recent  impaired   Memory remote:  impaired   Concentration/Attention:  distractable   Fund of Knowledge below average   Insight:  poor   Reliability poor   Judgment:  poor          VITALS:     Patient Vitals for the past 24 hrs:   Temp Pulse Resp BP   07/03/18 0608 - 66 16 113/42   07/02/18 1711 - 61 - 123/54     Wt Readings from Last 3 Encounters:   06/18/18 54.6 kg (120 lb 4.8 oz)     Temp Readings from Last 3 Encounters:   06/17/18 97.6 °F (36.4 °C)   06/15/18 97.6 °F (36.4 °C)     BP Readings from Last 3 Encounters:   07/03/18 113/42   06/17/18 102/47   06/16/18 99/55     Pulse Readings from Last 3 Encounters:   07/03/18 66   06/17/18 70   06/16/18 81            DATA     LABORATORY DATA:(reviewed/updated 7/3/2018)  No results found for this or any previous visit (from the past 24 hour(s)). No results found for: VALF2, VALAC, VALP, VALPR, DS6, CRBAM, CRBAMP, CARB2, XCRBAM  No results found for: LITHM   RADIOLOGY REPORTS:(reviewed/updated 7/3/2018)  Xr Chest Port    Result Date: 6/17/2018  EXAM:  XR CHEST PORT INDICATION:  Chest Pain COMPARISON:  None. FINDINGS: A portable AP radiograph of the chest was obtained at 720 hours. The patient is on a cardiac monitor. There is minor right basilar atelectasis. There is question of a vague right perihilar airspace process. There is slight diffuse interstitial prominence. Heart size is borderline enlarged. Patient status post median sternotomy. IMPRESSION: 1. There is suggestion of a vague right perihilar airspace process and follow-up studies would be helpful to assess for interval change. There is minor right basilar atelectasis.           MEDICATIONS     ALL MEDICATIONS:   Current Facility-Administered Medications   Medication Dose Route Frequency    risperiDONE (RisperDAL) tablet 0.75 mg  0.75 mg Oral QHS    memantine (NAMENDA) tablet 10 mg  10 mg Oral BID    dilTIAZem ER (CARDIZEM LA) tablet 120 mg  120 mg Oral DAILY    ziprasidone (GEODON) 10 mg in sterile water (preservative free) 0.5 mL injection  10 mg IntraMUSCular BID PRN    benztropine (COGENTIN) tablet 1 mg  1 mg Oral BID PRN    benztropine (COGENTIN) injection 1 mg  1 mg IntraMUSCular BID PRN    zolpidem (AMBIEN) tablet 5 mg  5 mg Oral QHS PRN    acetaminophen (TYLENOL) tablet 650 mg  650 mg Oral Q4H PRN    magnesium hydroxide (MILK OF MAGNESIA) 400 mg/5 mL oral suspension 30 mL  30 mL Oral DAILY PRN    nicotine (NICODERM CQ) 21 mg/24 hr patch 1 Patch  1 Patch TransDERmal DAILY PRN    aspirin chewable tablet 81 mg  81 mg Oral DAILY    famotidine (PEPCID) tablet 20 mg  20 mg Oral DAILY    carvedilol (COREG) tablet 3.125 mg  3.125 mg Oral BID WITH MEALS      SCHEDULED MEDICATIONS:   Current Facility-Administered Medications   Medication Dose Route Frequency    risperiDONE (RisperDAL) tablet 0.75 mg  0.75 mg Oral QHS    memantine (NAMENDA) tablet 10 mg  10 mg Oral BID    dilTIAZem ER (CARDIZEM LA) tablet 120 mg  120 mg Oral DAILY    aspirin chewable tablet 81 mg  81 mg Oral DAILY    famotidine (PEPCID) tablet 20 mg  20 mg Oral DAILY    carvedilol (COREG) tablet 3.125 mg  3.125 mg Oral BID WITH MEALS          ASSESSMENT & PLAN     DIAGNOSES REQUIRING ACTIVE TREATMENT AND MONITORING: (reviewed/updated 7/3/2018)  Patient Active Hospital Problem List:   Dementia (6/17/2018)    Assessment: moderate to severe- disoriented, poor memory and irritable. Need total care. No behavior problem and accepting med    Plan:  Charlie Shah - optimize dose- 5 mg bid. Pt has lost her VICENTE- will need placement  06/24/18: Continue current treatement  Hypotension  A: recent dc from medical unit- BP is improving  P: ct to monitor by IM- cautious with AP- dc Seroquel  6/25/18 continue same medications   6/26/18 continue same medications   6/27/18 continue same medications    6/28- add Risperdal and titrate. Ct with Namenda  6/29- cautious with prn Zyprexa due to unsteady gait- ct to adjust med  6/30- ct with tx  7/1- ct with tx plan  7/2- titrate Namenda  7/3- increase Risperdal. Ct with Namenda    In summary, Anastasia Pillai, is a 66 y.o.  female who presents with a severe exacerbation of the principal diagnosis of Dementia  Patient's condition is worsening/not improving/not stable . Patient requires continued inpatient hospitalization for further stabilization, safety monitoring and medication management.   I will continue to coordinate the provision of individual, milieu, occupational, group, and substance abuse therapies to address target symptoms/diagnoses as deemed appropriate for the individual patient. A coordinated, multidisplinary treatment team round was conducted with the patient (this team consists of the nurse, psychiatric unit pharmcist,  and writer). Complete current electronic health record for patient has been reviewed today including consultant notes, ancillary staff notes, nurses and psychiatric tech notes. Suicide risk assessment completed and patient deemed to be of low risk for suicide at this time. The following regarding medications was addressed during rounds with patient:   the risks and benefits of the proposed medication. The patient was given the opportunity to ask questions. Informed consent given to the use of the above medications. Will continue to adjust psychiatric and non-psychiatric medications (see above \"medication\" section and orders section for details) as deemed appropriate & based upon diagnoses and response to treatment. I will continue to order blood tests/labs and diagnostic tests as deemed appropriate and review results as they become available (see orders for details and above listed lab/test results). I will order psychiatric records from previous [facility] hospitals to further elucidate the nature of patient's psychopathology and review once available. I will gather additional collateral information from friends, family and o/p treatment team to further elucidate the nature of patient's psychopathology and baselline level of psychiatric functioning.          I certify that this patient's inpatient psychiatric hospital services furnished since the previous certification were, and continue to be, required for treatment that could reasonably be expected to improve the patient's condition, or for diagnostic study, and that the patient continues to need, on a daily basis, active treatment furnished directly by or requiring the supervision of inpatient psychiatric facility personnel. In addition, the hospital records show that services furnished were intensive treatment services, admission or related services, or equivalent services.     EXPECTED DISCHARGE DATE/DAY: TBD     DISPOSITION: Home/FCI       Signed By:   Rosibel Antonio MD  7/3/2018

## 2018-07-04 PROCEDURE — 74011250637 HC RX REV CODE- 250/637: Performed by: PSYCHIATRY & NEUROLOGY

## 2018-07-04 PROCEDURE — 65220000003 HC RM SEMIPRIVATE PSYCH

## 2018-07-04 PROCEDURE — 74011250637 HC RX REV CODE- 250/637: Performed by: INTERNAL MEDICINE

## 2018-07-04 RX ADMIN — ZOLPIDEM TARTRATE 5 MG: 5 TABLET ORAL at 21:45

## 2018-07-04 RX ADMIN — FAMOTIDINE 20 MG: 20 TABLET ORAL at 09:10

## 2018-07-04 RX ADMIN — MEMANTINE 10 MG: 10 TABLET ORAL at 18:04

## 2018-07-04 RX ADMIN — RISPERIDONE 0.75 MG: 0.25 TABLET ORAL at 21:45

## 2018-07-04 RX ADMIN — CARVEDILOL 3.12 MG: 3.12 TABLET, FILM COATED ORAL at 18:04

## 2018-07-04 RX ADMIN — DILTIAZEM HYDROCHLORIDE 120 MG: 120 TABLET, EXTENDED RELEASE ORAL at 09:10

## 2018-07-04 RX ADMIN — ASPIRIN 81 MG 81 MG: 81 TABLET ORAL at 09:10

## 2018-07-04 RX ADMIN — CARVEDILOL 3.12 MG: 3.12 TABLET, FILM COATED ORAL at 09:10

## 2018-07-04 RX ADMIN — MEMANTINE 10 MG: 10 TABLET ORAL at 09:10

## 2018-07-04 NOTE — BH NOTES
PSYCHIATRIC PROGRESS NOTE         Patient Name  Jessika No   Date of Birth 1940   [facility] [id number]   Medical Record Number  [id number]      Age  66 y.o. PCP Berenice Spears, MD   Admit date:  6/17/2018    Room Number  321/01  @ [facility]   Date of Service  7/4/2018           E & M PROGRESS NOTE:         HISTORY       CC:  \"confused, memory impairment\"  HISTORY OF PRESENT ILLNESS/INTERVAL HISTORY:  (reviewed/updated 7/4/2018). per initial evaluation: The patient, Jessika No, is a 68 y.o. WHITE [place]  female with a past psychiatric history significant for dementia, who presents at this time with complaints of (and/or evidence of) the following emotional symptoms: memory impairment. Additional symptomatology include disoriented to time and place, poor memory and behavior issues. pt has been incontent and need assistance with ADLs. She is irritable and easily gets loud and labile in mood. She was dc to medical unit for tx of hypotension and now back on U for further tx  The above symptoms have been present for years. These symptoms are of severe severity. These symptoms are constant  in nature. Jessika No presents/reports/evidences the following emotional symptoms today, 7/4/2018:memory impairment. The above symptoms have been present for years. These symptoms are of moderate severity. The symptoms are constant  in nature. Additional symptomatology and features include confused , disoriented to place and time. Pt is now accepting her med and need assistance with ADLs due to incontinence. No aggressive behavior and affect is bright , sleep is improving, pleasant and co operative. Received no prn.  6/25/18 she is compliant with medications and no anger issues like before and she gets confused and disoriented   6/26/18 she is doing better and mood is better and no anger issues and no SI or HI and slept well last night  6/27/18 she is the same and she is not engaging well and she has no anger issues and slept fairly last night but she was sleepy this morning and not engaging    6/28- remains confused with memory impairment. Pt has been hallucinating and paranoid- hiding behind her bed stating a women has been raped and stabbed. Received prn med. Sleep is better  6/29- episode of paranoid and mostly sun downing. Appear pleasant in the morning and no recollection of night time paranoia. unsteady gait due to sedation  6/30- pleasant and confused during the daytime, eating her breakfast. Still has paranoid behavior at night  7/1- slow progress as less sundowning and less paranoid. Pleasantly confused during the day time with no sig behavior problem  7/2- confused and disoriented with poor memory. Sun downing and worse in the evening with paranoia. 7/3- paranoid at night about a man coming to her room and killing. Fearful but pt is calm, pleasantly confused during daytime. Accepting med and no aggressive behavior  7/4- no sig behavior issue overnight and sleep is improved. Accepting med. Pleasant confused and with memory impairment      SIDE EFFECTS: (reviewed/updated 7/4/2018)  None reported or admitted to. No noted toxicity with use of current med   ALLERGIES:(reviewed/updated 7/4/2018)  Allergies   Allergen Reactions    Cephalosporins Itching    Codeine Hives    Erythromycin Hives    Nubain [Nalbuphine] Hives    Pcn [Penicillins] Hives    Percodan [Oxycodone Hcl-Oxycodone-Asa] Hives    Promethazine Hives    Protonix [Pantoprazole] Diarrhea    Sulfa (Sulfonamide Antibiotics) Hives    Toradol [Ketorolac] Hives    Ultram [Tramadol] Hives    Valium [Diazepam] Hives    Prilosec [Omeprazole] Itching      MEDICATIONS PRIOR TO ADMISSION:(reviewed/updated 7/4/2018)  Prescriptions Prior to Admission   Medication Sig    multivit-minerals/folic acid (WOMEN'S MULTIVITAMIN GUMMIES PO) Take 1 Tab by mouth daily.  QUEtiapine (SEROQUEL) 25 mg tablet Take 12.5 mg by mouth daily.     carvedilol (COREG) 3.125 mg tablet Take 3.125 mg by mouth two (2) times daily (with meals).  diclofenac (VOLTAREN) 1 % gel Apply 2 g to affected area every six (6) hours as needed (arthritis pain).  acetaminophen (TYLENOL) 325 mg tablet Take 650 mg by mouth every eight (8) hours as needed for Pain.  Menthol (ASPERCREME HEAT) 10 % gel by Apply Externally route every eight (8) hours as needed (arthritis).  aspirin 81 mg chewable tablet Take 81 mg by mouth daily. Indications: myocardial infarction prevention    dilTIAZem XR (DILACOR XR) 120 mg XR capsule Take 120 mg by mouth daily. Indications: hypertension, VENTRICULAR RATE CONTROL IN ATRIAL FIBRILLATION    furosemide (LASIX) 20 mg tablet Take 20 mg by mouth daily as needed. Indications: Edema, hypertension    loratadine (CLARITIN) 10 mg tablet Take 10 mg by mouth daily as needed. Indications: Allergic Rhinitis, SNEEZING    famotidine (PEPCID) 20 mg tablet Take 20 mg by mouth daily. Indications: Heartburn    QUEtiapine (SEROQUEL) 25 mg tablet Take 25 mg by mouth nightly. Indications: Generalized Anxiety Disorder      PAST MEDICAL HISTORY: Past medical history from the initial psychiatric evaluation has been reviewed (reviewed/updated 7/4/2018) with no additional updates (I asked patient and no additional past medical history provided). No past medical history on file. No past surgical history on file. SOCIAL HISTORY: Social history from the initial psychiatric evaluation has been reviewed (reviewed/updated 7/4/2018) with no additional updates (I asked patient and no additional social history provided). Social History     Social History    Marital status: SINGLE     Spouse name: N/A    Number of children: N/A    Years of education: N/A     Occupational History    Not on file.      Social History Main Topics    Smoking status: Not on file    Smokeless tobacco: Not on file    Alcohol use Not on file    Drug use: Not on file    Sexual activity: Not on file     Other Topics Concern    Not on file     Social History Narrative      FAMILY HISTORY: Family history from the initial psychiatric evaluation has been reviewed (reviewed/updated 7/4/2018) with no additional updates (I asked patient and no additional family history provided). No family history on file. REVIEW OF SYSTEMS: (reviewed/updated 7/4/2018)  Appetite:poor   Sleep: decreased more than normal   All other Review of Systems: Psychological ROS: positive for - behavioral disorder, concentration difficulties, disorientation and memory difficulties         [facility] (MSE):    MSE FINDINGS ARE WITHIN NORMAL LIMITS (WNL) UNLESS OTHERWISE STATED BELOW. ( ALL OF THE BELOW CATEGORIES OF THE MSE HAVE BEEN REVIEWED (reviewed 7/4/2018) AND UPDATED AS DEEMED APPROPRIATE )  General Presentation age appropriate and casually dressed, unreliable and vague   Orientation disorganized   Vital Signs  See below (reviewed 7/4/2018); Vital Signs (BP, Pulse, & Temp) are within normal limits if not listed below.    Gait and Station Stable/steady, no ataxia   Musculoskeletal System No extrapyramidal symptoms (EPS); no abnormal muscular movements or Tardive Dyskinesia (TD); muscle strength and tone are within normal limits   Language No aphasia or dysarthria   Speech:  hypoverbal   Thought Processes illogical; slow rate of thoughts; poor abstract reasoning/computation   Thought Associations tangential   Thought Content free of delusions, free of hallucinations and preoccupations   Suicidal Ideations none   Homicidal Ideations none   Mood:  Less anxious   Affect:  anxious and increased in intensity   Memory recent  impaired   Memory remote:  impaired   Concentration/Attention:  distractable   Fund of Knowledge below average   Insight:  poor   Reliability poor   Judgment:  poor          VITALS:     Patient Vitals for the past 24 hrs:   Temp Pulse Resp BP   07/04/18 0910 - 63 - 114/43 07/04/18 0615 97.6 °F (36.4 °C) 65 18 132/52   07/03/18 1800 - 76 - 128/50     Wt Readings from Last 3 Encounters:   06/18/18 54.6 kg (120 lb 4.8 oz)     Temp Readings from Last 3 Encounters:   07/04/18 97.6 °F (36.4 °C)   06/17/18 97.6 °F (36.4 °C)   06/15/18 97.6 °F (36.4 °C)     BP Readings from Last 3 Encounters:   07/04/18 114/43   06/17/18 102/47   06/16/18 99/55     Pulse Readings from Last 3 Encounters:   07/04/18 63   06/17/18 70   06/16/18 81            DATA     LABORATORY DATA:(reviewed/updated 7/4/2018)  No results found for this or any previous visit (from the past 24 hour(s)). No results found for: VALF2, VALAC, VALP, VALPR, DS6, CRBAM, CRBAMP, CARB2, XCRBAM  No results found for: LITHM   RADIOLOGY REPORTS:(reviewed/updated 7/4/2018)  Xr Chest Port    Result Date: 6/17/2018  EXAM:  XR CHEST PORT INDICATION:  Chest Pain COMPARISON:  None. FINDINGS: A portable AP radiograph of the chest was obtained at 720 hours. The patient is on a cardiac monitor. There is minor right basilar atelectasis. There is question of a vague right perihilar airspace process. There is slight diffuse interstitial prominence. Heart size is borderline enlarged. Patient status post median sternotomy. IMPRESSION: 1. There is suggestion of a vague right perihilar airspace process and follow-up studies would be helpful to assess for interval change. There is minor right basilar atelectasis.           MEDICATIONS     ALL MEDICATIONS:   Current Facility-Administered Medications   Medication Dose Route Frequency    risperiDONE (RisperDAL) tablet 0.75 mg  0.75 mg Oral QHS    memantine (NAMENDA) tablet 10 mg  10 mg Oral BID    dilTIAZem ER (CARDIZEM LA) tablet 120 mg  120 mg Oral DAILY    ziprasidone (GEODON) 10 mg in sterile water (preservative free) 0.5 mL injection  10 mg IntraMUSCular BID PRN    benztropine (COGENTIN) tablet 1 mg  1 mg Oral BID PRN    benztropine (COGENTIN) injection 1 mg  1 mg IntraMUSCular BID PRN    zolpidem (AMBIEN) tablet 5 mg  5 mg Oral QHS PRN    acetaminophen (TYLENOL) tablet 650 mg  650 mg Oral Q4H PRN    magnesium hydroxide (MILK OF MAGNESIA) 400 mg/5 mL oral suspension 30 mL  30 mL Oral DAILY PRN    nicotine (NICODERM CQ) 21 mg/24 hr patch 1 Patch  1 Patch TransDERmal DAILY PRN    aspirin chewable tablet 81 mg  81 mg Oral DAILY    famotidine (PEPCID) tablet 20 mg  20 mg Oral DAILY    carvedilol (COREG) tablet 3.125 mg  3.125 mg Oral BID WITH MEALS      SCHEDULED MEDICATIONS:   Current Facility-Administered Medications   Medication Dose Route Frequency    risperiDONE (RisperDAL) tablet 0.75 mg  0.75 mg Oral QHS    memantine (NAMENDA) tablet 10 mg  10 mg Oral BID    dilTIAZem ER (CARDIZEM LA) tablet 120 mg  120 mg Oral DAILY    aspirin chewable tablet 81 mg  81 mg Oral DAILY    famotidine (PEPCID) tablet 20 mg  20 mg Oral DAILY    carvedilol (COREG) tablet 3.125 mg  3.125 mg Oral BID WITH MEALS          ASSESSMENT & PLAN     DIAGNOSES REQUIRING ACTIVE TREATMENT AND MONITORING: (reviewed/updated 7/4/2018)  Patient Active Hospital Problem List:   Dementia (6/17/2018)    Assessment: moderate to severe- disoriented, poor memory and irritable. Need total care. No behavior problem and accepting med    Plan:  Branden Abreu. - optimize dose- 5 mg bid. Pt has lost her jail- will need placement  06/24/18: Continue current treatement  Hypotension  A: recent dc from medical unit- BP is improving  P: ct to monitor by IM- cautious with AP- dc Seroquel  6/25/18 continue same medications   6/26/18 continue same medications   6/27/18 continue same medications    6/28- add Risperdal and titrate.  Ct with Namenda  6/29- cautious with prn Zyprexa due to unsteady gait- ct to adjust med  6/30- ct with tx  7/1- ct with tx plan  7/2- titrate Namenda  7/3- increase Risperdal. Ct with Namenda  7/4- ct with tx plan    In summary, Chloe Romano, is a 66 y.o.  female who presents with a severe exacerbation of the principal diagnosis of Dementia  Patient's condition is worsening/not improving/not stable . Patient requires continued inpatient hospitalization for further stabilization, safety monitoring and medication management. I will continue to coordinate the provision of individual, milieu, occupational, group, and substance abuse therapies to address target symptoms/diagnoses as deemed appropriate for the individual patient. A coordinated, multidisplinary treatment team round was conducted with the patient (this team consists of the nurse, psychiatric unit pharmcist,  and writer). Complete current electronic health record for patient has been reviewed today including consultant notes, ancillary staff notes, nurses and psychiatric tech notes. Suicide risk assessment completed and patient deemed to be of low risk for suicide at this time. The following regarding medications was addressed during rounds with patient:   the risks and benefits of the proposed medication. The patient was given the opportunity to ask questions. Informed consent given to the use of the above medications. Will continue to adjust psychiatric and non-psychiatric medications (see above \"medication\" section and orders section for details) as deemed appropriate & based upon diagnoses and response to treatment. I will continue to order blood tests/labs and diagnostic tests as deemed appropriate and review results as they become available (see orders for details and above listed lab/test results). I will order psychiatric records from previous [facility] hospitals to further elucidate the nature of patient's psychopathology and review once available. I will gather additional collateral information from friends, family and o/p treatment team to further elucidate the nature of patient's psychopathology and baselline level of psychiatric functioning.          I certify that this patient's inpatient psychiatric hospital services furnished since the previous certification were, and continue to be, required for treatment that could reasonably be expected to improve the patient's condition, or for diagnostic study, and that the patient continues to need, on a daily basis, active treatment furnished directly by or requiring the supervision of inpatient psychiatric facility personnel. In addition, the hospital records show that services furnished were intensive treatment services, admission or related services, or equivalent services.     EXPECTED DISCHARGE DATE/DAY: TBD     DISPOSITION: Home/custodial       Signed By:   Radha Bustamante MD  7/4/2018

## 2018-07-04 NOTE — BH NOTES
Short term goal; seek staff when experiencing hallucinations. Progressing toward goal. Staff has been aware of hallucinations, as pt. Doesn't want to be in her room b/c of them. Pt. Alert & oriented to self only. Visible in milieu and pleasant. Responds appropriately upon approach. Medication & meal compliant. Continue to monitor pt. Status & assess needs.

## 2018-07-04 NOTE — BH NOTES
Short term goal; seeks staff as needed. Not progressing towards this goal, Is incontinent of stool, needs assist, doesn't call. Pt. Alert, confused re: time, place, situation. Responds appropriately upon approach. Compliant with meals & medications. Denies pain, or discomfort. Continue to monitor pt. Status & assess needs.

## 2018-07-05 ENCOUNTER — HOSPITAL ENCOUNTER (OUTPATIENT)
Dept: GENERAL RADIOLOGY | Age: 78
Discharge: HOME | End: 2018-07-05
Attending: INTERNAL MEDICINE
Payer: MEDICARE

## 2018-07-05 LAB
GLUCOSE BLD STRIP.AUTO-MCNC: 115 MG/DL (ref 65–100)
SERVICE CMNT-IMP: ABNORMAL

## 2018-07-05 PROCEDURE — 71045 X-RAY EXAM CHEST 1 VIEW: CPT

## 2018-07-05 PROCEDURE — 74011250637 HC RX REV CODE- 250/637: Performed by: PSYCHIATRY & NEUROLOGY

## 2018-07-05 PROCEDURE — 74018 RADEX ABDOMEN 1 VIEW: CPT

## 2018-07-05 PROCEDURE — 65220000003 HC RM SEMIPRIVATE PSYCH

## 2018-07-05 PROCEDURE — 82962 GLUCOSE BLOOD TEST: CPT

## 2018-07-05 PROCEDURE — 74011250637 HC RX REV CODE- 250/637: Performed by: INTERNAL MEDICINE

## 2018-07-05 RX ORDER — RISPERIDONE 1 MG/1
1 TABLET, FILM COATED ORAL
Status: DISCONTINUED | OUTPATIENT
Start: 2018-07-05 | End: 2018-07-14

## 2018-07-05 RX ADMIN — MEMANTINE 10 MG: 10 TABLET ORAL at 17:56

## 2018-07-05 RX ADMIN — RISPERIDONE 1 MG: 1 TABLET ORAL at 21:30

## 2018-07-05 RX ADMIN — ASPIRIN 81 MG 81 MG: 81 TABLET ORAL at 10:37

## 2018-07-05 RX ADMIN — MAGNESIUM HYDROXIDE 30 ML: 400 SUSPENSION ORAL at 22:22

## 2018-07-05 RX ADMIN — CARVEDILOL 3.12 MG: 3.12 TABLET, FILM COATED ORAL at 10:36

## 2018-07-05 RX ADMIN — DILTIAZEM HYDROCHLORIDE 120 MG: 120 TABLET, EXTENDED RELEASE ORAL at 10:36

## 2018-07-05 RX ADMIN — MEMANTINE 10 MG: 10 TABLET ORAL at 10:37

## 2018-07-05 RX ADMIN — CARVEDILOL 3.12 MG: 3.12 TABLET, FILM COATED ORAL at 17:56

## 2018-07-05 RX ADMIN — FAMOTIDINE 20 MG: 20 TABLET ORAL at 10:37

## 2018-07-05 NOTE — BH NOTES
GROUP THERAPY PROGRESS NOTE    The patient Mike lew 66 y.o. female is participating in [facility] ideasoft. Group time: 45 minutes    Personal goal for participation:  To identify positive coping strategies a-z    Goal orientation:  personal    Group therapy participation: minimal    Therapeutic interventions reviewed and discussed: worksheet    Impression of participation:  The patient was pleasant-disorganized-did attempt to do task    Hobert Border  7/5/2018  1:07 PM

## 2018-07-05 NOTE — PROGRESS NOTES
GROUP THERAPY PROGRESS NOTE      Una Go was present for medication group. GROUP TIME: 45 minutes, Thursdays 2pm    PERSONAL GOAL FOR PARTICIPATION: To be present for group, participate in discussion, and answer patient-directed questions. GOAL ORIENTATION: Personal    THERAPEUTIC INTERVENTIONS REVIEWED AND DISCUSSED: The following topics were presented: storage of medications, how to remember to refill medications and keep up with doctor appointments, relapse prevention, keeping a record of all medication including prescription and non-prescription drugs, and who to contact with medication questions. Patients were given time to ask questions regarding their current therapy. IMPRESSION OF PARTICIPATION: Patient was present during the group activity, however she did not participate in the quiz questions, discussion, or the medication bingo game.       [address] [facility] Student, Class of [id number]

## 2018-07-05 NOTE — PROGRESS NOTES
Problem: Dementia-BEHAVIORAL HEALTH (Adult/Pediatric)  Goal: *STG: Remains safe in hospital  Outcome: Progressing Towards Goal  Pt visible on the unit for snack and group. Pt reporting VH of dead woman in her room. Pt took medications as prescribed, consumed all meals without difficulty. Pt engaged in writing and coloring in day room. Pt wrapped her hand with tissue, there is no injury. VSS. Pt received Sebas STAPLETON. Staff will continue to monitor for health and safety.

## 2018-07-05 NOTE — PROGRESS NOTES
Problem: Dementia-BEHAVIORAL HEALTH (Adult/Pediatric)  Goal: *STG: Remains safe in hospital  Outcome: Progressing Towards Goal  Pt slept 1 1/2 hours until this time. Pt awake in bed for the majority of the shift. She was agitated at times, expressing delusions. Staff will continue to monitor for health and safety.

## 2018-07-05 NOTE — PROGRESS NOTES
Patient oriented to person only. Confused and delusional but calm and cooperative with staff. Patient socializes with peers and is visible in the milieu. Compliant with meals and medications. Denies thoughts of harming self or others. Patient has paranoid delusions but denies auditory, visual and tactile hallucinations. Will continue to monitor and assess.

## 2018-07-06 PROCEDURE — 74011250637 HC RX REV CODE- 250/637: Performed by: PSYCHIATRY & NEUROLOGY

## 2018-07-06 PROCEDURE — 74011250637 HC RX REV CODE- 250/637: Performed by: INTERNAL MEDICINE

## 2018-07-06 PROCEDURE — 65220000003 HC RM SEMIPRIVATE PSYCH

## 2018-07-06 RX ADMIN — ZOLPIDEM TARTRATE 5 MG: 5 TABLET ORAL at 21:51

## 2018-07-06 RX ADMIN — FAMOTIDINE 20 MG: 20 TABLET ORAL at 09:30

## 2018-07-06 RX ADMIN — CARVEDILOL 3.12 MG: 3.12 TABLET, FILM COATED ORAL at 16:29

## 2018-07-06 RX ADMIN — RISPERIDONE 1 MG: 1 TABLET ORAL at 21:51

## 2018-07-06 RX ADMIN — MEMANTINE 10 MG: 10 TABLET ORAL at 09:30

## 2018-07-06 RX ADMIN — CARVEDILOL 3.12 MG: 3.12 TABLET, FILM COATED ORAL at 09:30

## 2018-07-06 RX ADMIN — DILTIAZEM HYDROCHLORIDE 120 MG: 120 TABLET, EXTENDED RELEASE ORAL at 09:30

## 2018-07-06 RX ADMIN — ASPIRIN 81 MG 81 MG: 81 TABLET ORAL at 09:30

## 2018-07-06 RX ADMIN — MEMANTINE 10 MG: 10 TABLET ORAL at 16:29

## 2018-07-06 NOTE — PROGRESS NOTES
Problem: Dementia-BEHAVIORAL HEALTH (Adult/Pediatric)  Goal: *STG: Remains safe in hospital  Outcome: Progressing Towards Goal  Pt slept 4 hours to this time. Pt did not report any delusions or exhibit agitation when being put to bed. Pt did begin to cough after laying down, then vomited x2. Pt became tearful saying \"help me\". She then stopped responding to staff  and exhibited symptoms of stroke. A code S was called, pt became alert,  b/s was 115. VSS. Code S cancelled. Kristin Casas contacted can chest xray to r/o aspiration and an abdominal x ray to r/o impaction was ordered. Abdominal exam showed a \"moderate amount of stool throughout the colon, with large amount of  stool in the rectum\". Chest x ray showed \"No evidence of acute cardiopulmonary process\". Staff will continue to monitor for health and safety.

## 2018-07-06 NOTE — BH NOTES
Patient moaning in bed and holding chest. Patient unable to verbalize what is wrong. Vital signs taken and recorded as followed: B/P 146/70- pulse 86- respirations 15 bpm- SPO2 100% on room air; unable to assess pain. Patient had 1 episode of emesis about 50 ml. Patient given PRN MOM after noting that patient has not had a full bowel movement in several days. Will continue to monitor.

## 2018-07-06 NOTE — BH NOTES
Pt was observed sleeping in bed quietly with her chest rising up and down with no signs of distress. Pt slept poorly last night due to Code S was called on her. It was cancelled later after she began to respond. Dr. Tila Cain saw pt this morning. Pt's /43, P 68. Pt voiced no complaints. Will continue to monitor q 15 for safety, mood and behavior changes per behavior unit protocol.

## 2018-07-06 NOTE — PROGRESS NOTES
Problem: Dementia-BEHAVIORAL HEALTH (Adult/Pediatric)  Goal: *STG: Remains safe in hospital  Outcome: Progressing Towards Goal  Patient alert and oriented to self at baseline. Calm and cooperative on shift but continues to be paranoid and delusional. Participatory in groups/activites. Visible on the unit interacting appropriately with staff and peers. Meal and medication compliant. Denies SI/HI/hallucinations. Denies pain/discomfort. Assisted with incontinence care. Continues on q15 min safety checks. Will continue to monitor and continue plan of care.

## 2018-07-06 NOTE — BH NOTES
PSYCHIATRIC PROGRESS NOTE         Patient Name  Victoriano Nix   Date of Birth 1940   [facility] [id number]   Medical Record Number  [id number]      Age  66 y.o. PCP Berenice Spears, MD   Admit date:  6/17/2018    Room Number  321/01  @ [facility]   Date of Service  7/6/2018           E & M PROGRESS NOTE:         HISTORY       CC:  \"confused, memory impairment\"  HISTORY OF PRESENT ILLNESS/INTERVAL HISTORY:  (reviewed/updated 7/6/2018). per initial evaluation: The patient, Victoriano Nix, is a 68 y.o. WHITE [place]  female with a past psychiatric history significant for dementia, who presents at this time with complaints of (and/or evidence of) the following emotional symptoms: memory impairment. Additional symptomatology include disoriented to time and place, poor memory and behavior issues. pt has been incontent and need assistance with ADLs. She is irritable and easily gets loud and labile in mood. She was dc to medical unit for tx of hypotension and now back on U for further tx  The above symptoms have been present for years. These symptoms are of severe severity. These symptoms are constant  in nature. Victoriano Nix presents/reports/evidences the following emotional symptoms today, 7/6/2018:memory impairment. The above symptoms have been present for years. These symptoms are of moderate severity. The symptoms are constant  in nature. Additional symptomatology and features include confused , disoriented to place and time. Pt is now accepting her med and need assistance with ADLs due to incontinence. No aggressive behavior and affect is bright , sleep is improving, pleasant and co operative. Received no prn.  6/25/18 she is compliant with medications and no anger issues like before and she gets confused and disoriented   6/26/18 she is doing better and mood is better and no anger issues and no SI or HI and slept well last night  6/27/18 she is the same and she is not engaging well and she has no anger issues and slept fairly last night but she was sleepy this morning and not engaging    6/28- remains confused with memory impairment. Pt has been hallucinating and paranoid- hiding behind her bed stating a women has been raped and stabbed. Received prn med. Sleep is better  6/29- episode of paranoid and mostly sun downing. Appear pleasant in the morning and no recollection of night time paranoia. unsteady gait due to sedation  6/30- pleasant and confused during the daytime, eating her breakfast. Still has paranoid behavior at night  7/1- slow progress as less sundowning and less paranoid. Pleasantly confused during the day time with no sig behavior problem  7/2- confused and disoriented with poor memory. Sun downing and worse in the evening with paranoia. 7/3- paranoid at night about a man coming to her room and killing. Fearful but pt is calm, pleasantly confused during daytime. Accepting med and no aggressive behavior  7/4- no sig behavior issue overnight and sleep is improved. Accepting med. Pleasant confused and with memory impairment  7/5- sleep is poor and remains paranoid at night seeing dead people. Need assistance with ADLs. No behavior issue  7/6- disturbed sleep last nigh when code s was called due to pt not responding- later cancelled as pt's vital were within normal and cxr was wnl to rule out aspiration. Pt is currently in bed responding to command and appear to be in no distress. Poor memory and attention      SIDE EFFECTS: (reviewed/updated 7/6/2018)  None reported or admitted to.   No noted toxicity with use of current med   ALLERGIES:(reviewed/updated 7/6/2018)  Allergies   Allergen Reactions    Cephalosporins Itching    Codeine Hives    Erythromycin Hives    Nubain [Nalbuphine] Hives    Pcn [Penicillins] Hives    Percodan [Oxycodone Hcl-Oxycodone-Asa] Hives    Promethazine Hives    Protonix [Pantoprazole] Diarrhea    Sulfa (Sulfonamide Antibiotics) Hives    Toradol [Ketorolac] Hives    Ultram [Tramadol] Hives    Valium [Diazepam] Hives    Prilosec [Omeprazole] Itching      MEDICATIONS PRIOR TO ADMISSION:(reviewed/updated 7/6/2018)  Prescriptions Prior to Admission   Medication Sig    multivit-minerals/folic acid (WOMEN'S MULTIVITAMIN GUMMIES PO) Take 1 Tab by mouth daily.  QUEtiapine (SEROQUEL) 25 mg tablet Take 12.5 mg by mouth daily.  carvedilol (COREG) 3.125 mg tablet Take 3.125 mg by mouth two (2) times daily (with meals).  diclofenac (VOLTAREN) 1 % gel Apply 2 g to affected area every six (6) hours as needed (arthritis pain).  acetaminophen (TYLENOL) 325 mg tablet Take 650 mg by mouth every eight (8) hours as needed for Pain.  Menthol (ASPERCREME HEAT) 10 % gel by Apply Externally route every eight (8) hours as needed (arthritis).  aspirin 81 mg chewable tablet Take 81 mg by mouth daily. Indications: myocardial infarction prevention    dilTIAZem XR (DILACOR XR) 120 mg XR capsule Take 120 mg by mouth daily. Indications: hypertension, VENTRICULAR RATE CONTROL IN ATRIAL FIBRILLATION    furosemide (LASIX) 20 mg tablet Take 20 mg by mouth daily as needed. Indications: Edema, hypertension    loratadine (CLARITIN) 10 mg tablet Take 10 mg by mouth daily as needed. Indications: Allergic Rhinitis, SNEEZING    famotidine (PEPCID) 20 mg tablet Take 20 mg by mouth daily. Indications: Heartburn    QUEtiapine (SEROQUEL) 25 mg tablet Take 25 mg by mouth nightly. Indications: Generalized Anxiety Disorder      PAST MEDICAL HISTORY: Past medical history from the initial psychiatric evaluation has been reviewed (reviewed/updated 7/6/2018) with no additional updates (I asked patient and no additional past medical history provided). No past medical history on file. No past surgical history on file.    SOCIAL HISTORY: Social history from the initial psychiatric evaluation has been reviewed (reviewed/updated 7/6/2018) with no additional updates (I asked patient and no additional social history provided). Social History     Social History    Marital status: SINGLE     Spouse name: N/A    Number of children: N/A    Years of education: N/A     Occupational History    Not on file. Social History Main Topics    Smoking status: Not on file    Smokeless tobacco: Not on file    Alcohol use Not on file    Drug use: Not on file    Sexual activity: Not on file     Other Topics Concern    Not on file     Social History Narrative      FAMILY HISTORY: Family history from the initial psychiatric evaluation has been reviewed (reviewed/updated 7/6/2018) with no additional updates (I asked patient and no additional family history provided). No family history on file. REVIEW OF SYSTEMS: (reviewed/updated 7/6/2018)  Appetite:poor   Sleep: decreased more than normal   All other Review of Systems: Psychological ROS: positive for - behavioral disorder, concentration difficulties, disorientation and memory difficulties         [facility]    MSE FINDINGS ARE WITHIN NORMAL LIMITS (WNL) UNLESS OTHERWISE STATED BELOW. ( ALL OF THE BELOW CATEGORIES OF THE MSE HAVE BEEN REVIEWED (reviewed 7/6/2018) AND UPDATED AS DEEMED APPROPRIATE )  General Presentation age appropriate and casually dressed, unreliable and vague   Orientation disorganized   Vital Signs  See below (reviewed 7/6/2018); Vital Signs (BP, Pulse, & Temp) are within normal limits if not listed below.    Gait and Station Stable/steady, no ataxia   Musculoskeletal System No extrapyramidal symptoms (EPS); no abnormal muscular movements or Tardive Dyskinesia (TD); muscle strength and tone are within normal limits   Language No aphasia or dysarthria   Speech:  hypoverbal   Thought Processes illogical; slow rate of thoughts; poor abstract reasoning/computation   Thought Associations tangential   Thought Content free of delusions, free of hallucinations and preoccupations   Suicidal Ideations none   Homicidal Ideations none   Mood:  Less anxious   Affect:  anxious and increased in intensity   Memory recent  impaired   Memory remote:  impaired   Concentration/Attention:  distractable   Fund of Knowledge below average   Insight:  poor   Reliability poor   Judgment:  poor          VITALS:     Patient Vitals for the past 24 hrs:   Temp Pulse Resp BP SpO2   07/06/18 1619 98.9 °F (37.2 °C) 66 - 139/54 -   07/06/18 0930 - 68 - 121/43 -   07/06/18 0626 - 60 16 121/54 -   07/05/18 1949 98.4 °F (36.9 °C) 74 16 141/66 99 %   07/05/18 1705 98.1 °F (36.7 °C) 64 16 135/58 -     Wt Readings from Last 3 Encounters:   06/18/18 54.6 kg (120 lb 4.8 oz)     Temp Readings from Last 3 Encounters:   07/06/18 98.9 °F (37.2 °C)   06/17/18 97.6 °F (36.4 °C)   06/15/18 97.6 °F (36.4 °C)     BP Readings from Last 3 Encounters:   07/06/18 139/54   06/17/18 102/47   06/16/18 99/55     Pulse Readings from Last 3 Encounters:   07/06/18 66   06/17/18 70   06/16/18 81            DATA     LABORATORY DATA:(reviewed/updated 7/6/2018)  Recent Results (from the past 24 hour(s))   GLUCOSE, POC    Collection Time: 07/05/18 10:37 PM   Result Value Ref Range    Glucose (POC) 115 (H) 65 - 100 mg/dL    Performed by Laura Vivas      No results found for: VALF2, VALAC, VALP, VALPR, DS6, CRBAM, CRBAMP, CARB2, XCRBAM  No results found for: LITHM   RADIOLOGY REPORTS:(reviewed/updated 7/6/2018)  Xr Chest Port    Result Date: 6/17/2018  EXAM:  XR CHEST PORT INDICATION:  Chest Pain COMPARISON:  None. FINDINGS: A portable AP radiograph of the chest was obtained at 720 hours. The patient is on a cardiac monitor. There is minor right basilar atelectasis. There is question of a vague right perihilar airspace process. There is slight diffuse interstitial prominence. Heart size is borderline enlarged. Patient status post median sternotomy. IMPRESSION: 1.  There is suggestion of a vague right perihilar airspace process and follow-up studies would be helpful to assess for interval change. There is minor right basilar atelectasis. MEDICATIONS     ALL MEDICATIONS:   Current Facility-Administered Medications   Medication Dose Route Frequency    risperiDONE (RisperDAL) tablet 1 mg  1 mg Oral QHS    memantine (NAMENDA) tablet 10 mg  10 mg Oral BID    dilTIAZem ER (CARDIZEM LA) tablet 120 mg  120 mg Oral DAILY    ziprasidone (GEODON) 10 mg in sterile water (preservative free) 0.5 mL injection  10 mg IntraMUSCular BID PRN    benztropine (COGENTIN) tablet 1 mg  1 mg Oral BID PRN    benztropine (COGENTIN) injection 1 mg  1 mg IntraMUSCular BID PRN    zolpidem (AMBIEN) tablet 5 mg  5 mg Oral QHS PRN    acetaminophen (TYLENOL) tablet 650 mg  650 mg Oral Q4H PRN    magnesium hydroxide (MILK OF MAGNESIA) 400 mg/5 mL oral suspension 30 mL  30 mL Oral DAILY PRN    nicotine (NICODERM CQ) 21 mg/24 hr patch 1 Patch  1 Patch TransDERmal DAILY PRN    aspirin chewable tablet 81 mg  81 mg Oral DAILY    famotidine (PEPCID) tablet 20 mg  20 mg Oral DAILY    carvedilol (COREG) tablet 3.125 mg  3.125 mg Oral BID WITH MEALS      SCHEDULED MEDICATIONS:   Current Facility-Administered Medications   Medication Dose Route Frequency    risperiDONE (RisperDAL) tablet 1 mg  1 mg Oral QHS    memantine (NAMENDA) tablet 10 mg  10 mg Oral BID    dilTIAZem ER (CARDIZEM LA) tablet 120 mg  120 mg Oral DAILY    aspirin chewable tablet 81 mg  81 mg Oral DAILY    famotidine (PEPCID) tablet 20 mg  20 mg Oral DAILY    carvedilol (COREG) tablet 3.125 mg  3.125 mg Oral BID WITH MEALS          ASSESSMENT & PLAN     DIAGNOSES REQUIRING ACTIVE TREATMENT AND MONITORING: (reviewed/updated 7/6/2018)  Patient Active Hospital Problem List:   Dementia (6/17/2018)    Assessment: moderate to severe- disoriented, poor memory and irritable. Need total care. No behavior problem and accepting med    Plan:  Ed Nash - optimize dose- 5 mg bid.  Pt has lost her [facility]- will need placement  06/24/18: Continue current treatement  Hypotension  A: recent dc from medical unit- BP is improving  P: ct to monitor by IM- cautious with AP- dc Seroquel  6/25/18 continue same medications   6/26/18 continue same medications   6/27/18 continue same medications    6/28- add Risperdal and titrate. Ct with Namenda  6/29- cautious with prn Zyprexa due to unsteady gait- ct to adjust med  6/30- ct with tx  7/1- ct with tx plan  7/2- titrate Namenda  7/3- increase Risperdal. Ct with Namenda  7/4- ct with tx plan  7/5- titrtae Risperdal to 1 mg HS  7/6- ct with med. Vitals per shift-IM to evaluate following code called last night  In summary, Keeley Vee, is a 66 y.o.  female who presents with a severe exacerbation of the principal diagnosis of Dementia  Patient's condition is worsening/not improving/not stable . Patient requires continued inpatient hospitalization for further stabilization, safety monitoring and medication management. I will continue to coordinate the provision of individual, milieu, occupational, group, and substance abuse therapies to address target symptoms/diagnoses as deemed appropriate for the individual patient. A coordinated, multidisplinary treatment team round was conducted with the patient (this team consists of the nurse, psychiatric unit pharmcist,  and writer). Complete current electronic health record for patient has been reviewed today including consultant notes, ancillary staff notes, nurses and psychiatric tech notes. Suicide risk assessment completed and patient deemed to be of low risk for suicide at this time. The following regarding medications was addressed during rounds with patient:   the risks and benefits of the proposed medication. The patient was given the opportunity to ask questions. Informed consent given to the use of the above medications.  Will continue to adjust psychiatric and non-psychiatric medications (see above \"medication\" section and orders section for details) as deemed appropriate & based upon diagnoses and response to treatment. I will continue to order blood tests/labs and diagnostic tests as deemed appropriate and review results as they become available (see orders for details and above listed lab/test results). I will order psychiatric records from previous [facility] hospitals to further elucidate the nature of patient's psychopathology and review once available. I will gather additional collateral information from friends, family and o/p treatment team to further elucidate the nature of patient's psychopathology and baselline level of psychiatric functioning. I certify that this patient's inpatient psychiatric hospital services furnished since the previous certification were, and continue to be, required for treatment that could reasonably be expected to improve the patient's condition, or for diagnostic study, and that the patient continues to need, on a daily basis, active treatment furnished directly by or requiring the supervision of inpatient psychiatric facility personnel. In addition, the hospital records show that services furnished were intensive treatment services, admission or related services, or equivalent services.     EXPECTED DISCHARGE DATE/DAY: TBD     DISPOSITION: Home/FCI       Signed By:   Baldev Patel MD  7/6/2018

## 2018-07-07 PROCEDURE — 74011250637 HC RX REV CODE- 250/637: Performed by: INTERNAL MEDICINE

## 2018-07-07 PROCEDURE — 65220000003 HC RM SEMIPRIVATE PSYCH

## 2018-07-07 PROCEDURE — 74011250637 HC RX REV CODE- 250/637: Performed by: PSYCHIATRY & NEUROLOGY

## 2018-07-07 RX ADMIN — ASPIRIN 81 MG 81 MG: 81 TABLET ORAL at 08:08

## 2018-07-07 RX ADMIN — CARVEDILOL 3.12 MG: 3.12 TABLET, FILM COATED ORAL at 08:08

## 2018-07-07 RX ADMIN — ZOLPIDEM TARTRATE 5 MG: 5 TABLET ORAL at 21:14

## 2018-07-07 RX ADMIN — CARVEDILOL 3.12 MG: 3.12 TABLET, FILM COATED ORAL at 16:49

## 2018-07-07 RX ADMIN — FAMOTIDINE 20 MG: 20 TABLET ORAL at 08:08

## 2018-07-07 RX ADMIN — RISPERIDONE 1 MG: 1 TABLET ORAL at 21:14

## 2018-07-07 RX ADMIN — MEMANTINE 10 MG: 10 TABLET ORAL at 18:12

## 2018-07-07 RX ADMIN — MEMANTINE 10 MG: 10 TABLET ORAL at 08:08

## 2018-07-07 RX ADMIN — DILTIAZEM HYDROCHLORIDE 120 MG: 120 TABLET, EXTENDED RELEASE ORAL at 08:09

## 2018-07-07 NOTE — BH NOTES
Problem: Altered Thought Process (Adult/Pediatric) Goal: *STG: Complies with medication therapy Outcome: Progressing Towards Goal 
Pt is isolative from the milieu. Pt is alert & oriented only to self. Pt remains confused and disoriented x 3. Pt walks slowly but steady. Pt is meds/meals compliant. Pt is incontinent of everything and received total care of ADL. Pt was present in her treatment team meeting today. Pt voiced no complaints. Will continue to monitor q 15 for safety, mood and behavior changes.

## 2018-07-07 NOTE — BH NOTES
PSYCHIATRIC PROGRESS NOTE Patient Name  Karri Garcia  
Date of Birth 1940 [facility] [id number] Medical Record Number  [id number] Age  66 y.o. PCP Berenice Spears, MD  
Admit date:  6/17/2018 Room Number  638/26  @ [facility]  
Date of Service  7/7/2018 E & M PROGRESS NOTE: 
  
 
 
HISTORY  
   
CC:  \"confused, memory impairment\" HISTORY OF PRESENT ILLNESS/INTERVAL HISTORY:  (reviewed/updated 7/7/2018). per initial evaluation: The patient, Karri Garcia, is a 68 y.o. WHITE [place]  female with a past psychiatric history significant for dementia, who presents at this time with complaints of (and/or evidence of) the following emotional symptoms: memory impairment. Additional symptomatology include disoriented to time and place, poor memory and behavior issues. pt has been incontent and need assistance with ADLs. She is irritable and easily gets loud and labile in mood. She was dc to medical unit for tx of hypotension and now back on U for further tx  The above symptoms have been present for years. These symptoms are of severe severity. These symptoms are constant  in nature. Karri Garcia presents/reports/evidences the following emotional symptoms today, 7/7/2018:memory impairment. The above symptoms have been present for years. These symptoms are of moderate severity. The symptoms are constant  in nature. Additional symptomatology and features include confused , disoriented to place and time. Pt is now accepting her med and need assistance with ADLs due to incontinence. No aggressive behavior and affect is bright , sleep is improving, pleasant and co operative. Received no prn. 
6/25/18 she is compliant with medications and no anger issues like before and she gets confused and disoriented 6/26/18 she is doing better and mood is better and no anger issues and no SI or HI and slept well last night 6/27/18 she is the same and she is not engaging well and she has no anger issues and slept fairly last night but she was sleepy this morning and not engaging 6/28- remains confused with memory impairment. Pt has been hallucinating and paranoid- hiding behind her bed stating a women has been raped and stabbed. Received prn med. Sleep is better 6/29- episode of paranoid and mostly sun downing. Appear pleasant in the morning and no recollection of night time paranoia. unsteady gait due to sedation 6/30- pleasant and confused during the daytime, eating her breakfast. Still has paranoid behavior at night 7/1- slow progress as less sundowning and less paranoid. Pleasantly confused during the day time with no sig behavior problem 7/2- confused and disoriented with poor memory. Sun downing and worse in the evening with paranoia. 7/3- paranoid at night about a man coming to her room and killing. Fearful but pt is calm, pleasantly confused during daytime. Accepting med and no aggressive behavior 7/4- no sig behavior issue overnight and sleep is improved. Accepting med. Pleasant confused and with memory impairment 7/5- sleep is poor and remains paranoid at night seeing dead people. Need assistance with ADLs. No behavior issue 7/6- disturbed sleep last nigh when code s was called due to pt not responding- later cancelled as pt's vital were within normal and cxr was wnl to rule out aspiration. Pt is currently in bed responding to command and appear to be in no distress. Poor memory and attention 7/7-Pt was seen in her room with the RN. Presents alert, verbal, oriented to self only. Affect is flat. States she is feeling fine. Prn's used-Ambien 5 mg. No AVH or SI/HI. SIDE EFFECTS: (reviewed/updated 7/7/2018) None reported or admitted to. No noted toxicity with use of current med ALLERGIES:(reviewed/updated 7/7/2018) Allergies Allergen Reactions  Cephalosporins Itching  Codeine Hives  Erythromycin Hives  Nubain [Nalbuphine] Hives  Pcn [Penicillins] Hives  Percodan [Oxycodone Hcl-Oxycodone-Asa] Hives  Promethazine Hives  Protonix [Pantoprazole] Diarrhea  Sulfa (Sulfonamide Antibiotics) Hives  Toradol [Ketorolac] Hives  Ultram [Tramadol] Hives  Valium [Diazepam] Hives  Prilosec [Omeprazole] Itching MEDICATIONS PRIOR TO ADMISSION:(reviewed/updated 7/7/2018) Prescriptions Prior to Admission Medication Sig  
 multivit-minerals/folic acid (WOMEN'S MULTIVITAMIN GUMMIES PO) Take 1 Tab by mouth daily.  QUEtiapine (SEROQUEL) 25 mg tablet Take 12.5 mg by mouth daily.  carvedilol (COREG) 3.125 mg tablet Take 3.125 mg by mouth two (2) times daily (with meals).  diclofenac (VOLTAREN) 1 % gel Apply 2 g to affected area every six (6) hours as needed (arthritis pain).  acetaminophen (TYLENOL) 325 mg tablet Take 650 mg by mouth every eight (8) hours as needed for Pain.  Menthol (ASPERCREME HEAT) 10 % gel by Apply Externally route every eight (8) hours as needed (arthritis).  aspirin 81 mg chewable tablet Take 81 mg by mouth daily. Indications: myocardial infarction prevention  dilTIAZem XR (DILACOR XR) 120 mg XR capsule Take 120 mg by mouth daily. Indications: hypertension, VENTRICULAR RATE CONTROL IN ATRIAL FIBRILLATION  furosemide (LASIX) 20 mg tablet Take 20 mg by mouth daily as needed. Indications: Edema, hypertension  loratadine (CLARITIN) 10 mg tablet Take 10 mg by mouth daily as needed. Indications: Allergic Rhinitis, SNEEZING  
 famotidine (PEPCID) 20 mg tablet Take 20 mg by mouth daily. Indications: Heartburn  QUEtiapine (SEROQUEL) 25 mg tablet Take 25 mg by mouth nightly. Indications: Generalized Anxiety Disorder PAST MEDICAL HISTORY: Past medical history from the initial psychiatric evaluation has been reviewed (reviewed/updated 7/7/2018) with no additional updates (I asked patient and no additional past medical history provided). No past medical history on file. No past surgical history on file. SOCIAL HISTORY: Social history from the initial psychiatric evaluation has been reviewed (reviewed/updated 7/7/2018) with no additional updates (I asked patient and no additional social history provided). Social History Social History  Marital status: SINGLE Spouse name: N/A  
 Number of children: N/A  
 Years of education: N/A Occupational History  Not on file. Social History Main Topics  Smoking status: Not on file  Smokeless tobacco: Not on file  Alcohol use Not on file  Drug use: Not on file  Sexual activity: Not on file Other Topics Concern  Not on file Social History Narrative FAMILY HISTORY: Family history from the initial psychiatric evaluation has been reviewed (reviewed/updated 7/7/2018) with no additional updates (I asked patient and no additional family history provided). No family history on file. REVIEW OF SYSTEMS: (reviewed/updated 7/7/2018) Appetite:poor Sleep: decreased more than normal  
All other Review of Systems: Psychological ROS: positive for - behavioral disorder, concentration difficulties, disorientation and memory difficulties [facility] MENTAL STATUS EXAM (MSE): MSE FINDINGS ARE WITHIN NORMAL LIMITS (WNL) UNLESS OTHERWISE STATED BELOW. ( ALL OF THE BELOW CATEGORIES OF THE MSE HAVE BEEN REVIEWED (reviewed 7/7/2018) AND UPDATED AS DEEMED APPROPRIATE ) General Presentation age appropriate and casually dressed, unreliable and vague Orientation disorganized Vital Signs  See below (reviewed 7/7/2018); Vital Signs (BP, Pulse, & Temp) are within normal limits if not listed below. Gait and Station Stable/steady, no ataxia Musculoskeletal System No extrapyramidal symptoms (EPS); no abnormal muscular movements or Tardive Dyskinesia (TD); muscle strength and tone are within normal limits Language No aphasia or dysarthria Speech:  hypoverbal  
Thought Processes illogical; slow rate of thoughts; poor abstract reasoning/computation Thought Associations tangential  
Thought Content free of delusions, free of hallucinations and preoccupations Suicidal Ideations none Homicidal Ideations none Mood:  Less anxious Affect:  anxious and increased in intensity Memory recent  impaired Memory remote:  impaired Concentration/Attention:  distractable Fund of Knowledge below average Insight:  poor Reliability poor Judgment:  poor VITALS:    
Patient Vitals for the past 24 hrs: 
 Temp Pulse Resp BP  
07/07/18 1649 - 63 - 135/55  
07/07/18 1200 - 71 18 136/66  
07/07/18 0808 - (!) 59 - -  
07/07/18 0649 - (!) 54 16 146/63  
07/06/18 2220 97.7 °F (36.5 °C) 69 16 147/62 Wt Readings from Last 3 Encounters:  
06/18/18 54.6 kg (120 lb 4.8 oz) Temp Readings from Last 3 Encounters:  
06/17/18 97.6 °F (36.4 °C)  
06/15/18 97.6 °F (36.4 °C) BP Readings from Last 3 Encounters:  
07/07/18 135/55  
06/17/18 102/47  
06/16/18 99/55 Pulse Readings from Last 3 Encounters:  
07/07/18 63  
06/17/18 70  
06/16/18 81 DATA LABORATORY DATA:(reviewed/updated 7/7/2018) No results found for this or any previous visit (from the past 24 hour(s)). No results found for: VALF2, VALAC, VALP, VALPR, DS6, CRBAM, CRBAMP, CARB2, XCRBAM 
No results found for: LITHM  
RADIOLOGY REPORTS:(reviewed/updated 7/7/2018) Xr Chest [facility] Result Date: 6/17/2018 EXAM:  XR CHEST PORT INDICATION:  Chest Pain COMPARISON:  None. FINDINGS: A portable AP radiograph of the chest was obtained at 720 hours. The patient is on a cardiac monitor. There is minor right basilar atelectasis. There is question of a vague right perihilar airspace process. There is slight diffuse interstitial prominence. Heart size is borderline enlarged. Patient status post median sternotomy. IMPRESSION: 1.  There is suggestion of a vague right perihilar airspace process and follow-up studies would be helpful to assess for interval change. There is minor right basilar atelectasis. MEDICATIONS ALL MEDICATIONS:  
Current Facility-Administered Medications Medication Dose Route Frequency  risperiDONE (RisperDAL) tablet 1 mg  1 mg Oral QHS  memantine (NAMENDA) tablet 10 mg  10 mg Oral BID  dilTIAZem ER (CARDIZEM LA) tablet 120 mg  120 mg Oral DAILY  ziprasidone (GEODON) 10 mg in sterile water (preservative free) 0.5 mL injection  10 mg IntraMUSCular BID PRN  
 benztropine (COGENTIN) tablet 1 mg  1 mg Oral BID PRN  
 benztropine (COGENTIN) injection 1 mg  1 mg IntraMUSCular BID PRN  
 zolpidem (AMBIEN) tablet 5 mg  5 mg Oral QHS PRN  
 acetaminophen (TYLENOL) tablet 650 mg  650 mg Oral Q4H PRN  
 magnesium hydroxide (MILK OF MAGNESIA) 400 mg/5 mL oral suspension 30 mL  30 mL Oral DAILY PRN  
 nicotine (NICODERM CQ) 21 mg/24 hr patch 1 Patch  1 Patch TransDERmal DAILY PRN  
 aspirin chewable tablet 81 mg  81 mg Oral DAILY  famotidine (PEPCID) tablet 20 mg  20 mg Oral DAILY  carvedilol (COREG) tablet 3.125 mg  3.125 mg Oral BID WITH MEALS  
  
SCHEDULED MEDICATIONS:  
Current Facility-Administered Medications Medication Dose Route Frequency  risperiDONE (RisperDAL) tablet 1 mg  1 mg Oral QHS  memantine (NAMENDA) tablet 10 mg  10 mg Oral BID  dilTIAZem ER (CARDIZEM LA) tablet 120 mg  120 mg Oral DAILY  aspirin chewable tablet 81 mg  81 mg Oral DAILY  famotidine (PEPCID) tablet 20 mg  20 mg Oral DAILY  carvedilol (COREG) tablet 3.125 mg  3.125 mg Oral BID WITH MEALS  
  
 
 
ASSESSMENT & PLAN  
 
DIAGNOSES REQUIRING ACTIVE TREATMENT AND MONITORING: (reviewed/updated 7/7/2018) Patient Active Hospital Problem List: 
 Dementia (6/17/2018) Assessment: moderate to severe- disoriented, poor memory and irritable. Need total care. No behavior problem and accepting med Plan:  Namenda. - optimize dose- 5 mg bid. Pt has lost her VICENTE- will need placement 06/24/18: Continue current treatement Hypotension A: recent dc from medical unit- BP is improving P: ct to monitor by IM- cautious with AP- dc Seroquel 6/25/18 continue same medications 6/26/18 continue same medications 6/27/18 continue same medications 6/28- add Risperdal and titrate. Ct with Namenda 6/29- cautious with prn Zyprexa due to unsteady gait- ct to adjust med 6/30- ct with tx 
7/1- ct with tx plan 7/2- titrate Namenda 7/3- increase Risperdal. Ct with Namenda 7/4- ct with tx plan 7/5- titrtae Risperdal to 1 mg HS 
7/6- ct with med. Vitals per shift-IM to evaluate following code called last night In summary, Ata Gómez, is a 66 y.o.  female who presents with a severe exacerbation of the principal diagnosis of Dementia Patient's condition is worsening/not improving/not stable . Patient requires continued inpatient hospitalization for further stabilization, safety monitoring and medication management. I will continue to coordinate the provision of individual, milieu, occupational, group, and substance abuse therapies to address target symptoms/diagnoses as deemed appropriate for the individual patient. A coordinated, multidisplinary treatment team round was conducted with the patient (this team consists of the nurse, psychiatric unit pharmcist,  and writer). Complete current electronic health record for patient has been reviewed today including consultant notes, ancillary staff notes, nurses and psychiatric tech notes. Suicide risk assessment completed and patient deemed to be of low risk for suicide at this time. The following regarding medications was addressed during rounds with patient:  
the risks and benefits of the proposed medication. The patient was given the opportunity to ask questions. Informed consent given to the use of the above medications.  Will continue to adjust psychiatric and non-psychiatric medications (see above \"medication\" section and orders section for details) as deemed appropriate & based upon diagnoses and response to treatment. I will continue to order blood tests/labs and diagnostic tests as deemed appropriate and review results as they become available (see orders for details and above listed lab/test results). I will order psychiatric records from previous [facility] hospitals to further elucidate the nature of patient's psychopathology and review once available. I will gather additional collateral information from friends, family and o/p treatment team to further elucidate the nature of patient's psychopathology and baselline level of psychiatric functioning. I certify that this patient's inpatient psychiatric hospital services furnished since the previous certification were, and continue to be, required for treatment that could reasonably be expected to improve the patient's condition, or for diagnostic study, and that the patient continues to need, on a daily basis, active treatment furnished directly by or requiring the supervision of inpatient psychiatric facility personnel. In addition, the hospital records show that services furnished were intensive treatment services, admission or related services, or equivalent services. EXPECTED DISCHARGE DATE/DAY: TBD  
 
DISPOSITION: Home/[facility]ENTE Signed By:  
Keesha John MD 
7/7/2018

## 2018-07-07 NOTE — BH NOTES
GROUP THERAPY PROGRESS NOTE Chloe Im is participating in [employer] Group time: 15 minutes Personal goal for participation: Unit orientation Goal orientation: social 
 
Group therapy participation: did not attend Therapeutic interventions reviewed and discussed: yes Impression of participation: none

## 2018-07-07 NOTE — PROGRESS NOTES
General Daily Progress Note    Admit Date: 6/17/2018    Subjective:     Asked to see patient for nausea and vomiting. Pt had been given Compazine yesterday. Current Facility-Administered Medications   Medication Dose Route Frequency    risperiDONE (RisperDAL) tablet 1 mg  1 mg Oral QHS    memantine (NAMENDA) tablet 10 mg  10 mg Oral BID    dilTIAZem ER (CARDIZEM LA) tablet 120 mg  120 mg Oral DAILY    ziprasidone (GEODON) 10 mg in sterile water (preservative free) 0.5 mL injection  10 mg IntraMUSCular BID PRN    benztropine (COGENTIN) tablet 1 mg  1 mg Oral BID PRN    benztropine (COGENTIN) injection 1 mg  1 mg IntraMUSCular BID PRN    zolpidem (AMBIEN) tablet 5 mg  5 mg Oral QHS PRN    acetaminophen (TYLENOL) tablet 650 mg  650 mg Oral Q4H PRN    magnesium hydroxide (MILK OF MAGNESIA) 400 mg/5 mL oral suspension 30 mL  30 mL Oral DAILY PRN    nicotine (NICODERM CQ) 21 mg/24 hr patch 1 Patch  1 Patch TransDERmal DAILY PRN    aspirin chewable tablet 81 mg  81 mg Oral DAILY    famotidine (PEPCID) tablet 20 mg  20 mg Oral DAILY    carvedilol (COREG) tablet 3.125 mg  3.125 mg Oral BID WITH MEALS            Objective:     Patient Vitals for the past 8 hrs:   BP Temp Pulse   07/06/18 1619 139/54 98.9 °F (37.2 °C) 66             Physical Exam:   Visit Vitals    /54    Pulse 66    Temp 98.9 °F (37.2 °C)    Resp 16    Ht 5' 1.25\" (1.556 m)    Wt 54.6 kg (120 lb 4.8 oz)    SpO2 99%    Breastfeeding No    BMI 22.55 kg/m2     General:  Alert, cooperative, no distress, appears stated age. Head:  Normocephalic, without obvious abnormality, atraumatic. Eyes:  Conjunctivae/corneas clear. PERRL, EOMs intact. Lungs:   Clear to auscultation bilaterally. Chest wall:  No tenderness or deformity. Heart:  Regular rate and rhythm, S1, S2 normal, no murmur, click, rub or gallop. Abdomen:   Soft, non-tender. Bowel sounds normal. No masses,  No organomegaly.            Extremities: Extremities normal, atraumatic, no cyanosis or edema. Pulses: 2+ and symmetric all extremities. Skin: Skin color, texture, turgor normal. No rashes or lesions               Recent Results (from the past 24 hour(s))   GLUCOSE, POC    Collection Time: 07/05/18 10:37 PM   Result Value Ref Range    Glucose (POC) 115 (H) 65 - 100 mg/dL    Performed by Gabby Jon          Assessment:     Principal Problem:    Dementia (6/17/2018)    Nausea and vomiting    Plan:     No clear etiology for nausea or vomiting. Symptoms have resolved.

## 2018-07-07 NOTE — BH NOTES
Problem: Altered Thought Process (Adult/Pediatric) Goal: *STG: Complies with medication therapy Outcome: Progressing Towards Goal 
Pt is visible in the milieu this evening shift. Pt is alert & oriented only to self. Pt remains confused and disoriented x 3. Pt walks slowly but steady. Pt is meds/meals compliant. Pt has good appetite. Pt is incontinent of everything and received total care of ADL. Will continue to monitor q 15 for safety, mood and behavior changes.

## 2018-07-07 NOTE — PROGRESS NOTES
Problem: [address] (Adult/Pediatric) Goal: *STG: Remains safe in hospital 
Outcome: Progressing Towards Goal 
Pt slept 5 hours. Pt had uneventful night. PRN Ambien given. Pt had no complaints or signs of distress throughout night. Respirations were unlabored. Continuing to monitor with q15 min rounds for safety.

## 2018-07-07 NOTE — PROGRESS NOTES
Patient had been asleep for most all of the morning. When attempting to get patient out of bed she became agitated with this writer. Patient required fresh linen, had large stool  In attends adult diaper that wasn't contained . Patient mood is depressed, and affect is flat. Assisted to the day room for lunch.

## 2018-07-07 NOTE — PROGRESS NOTES
Problem: Dementia-BEHAVIORAL HEALTH (Adult/Pediatric)  Goal: *STG: Remains safe in hospital  Outcome: Progressing Towards Goal  Pt oriented to self only. She isolated to room majority of shift. She is a little irritable and labile. Not attending groups. Meds/meal compliant. No PRNs given this shift. No behavioral issues. Denies suicidal and homicidal ideations. Denies auditory and visual hallucinations. Will continue to monitor pt with q15 min rounds for safety.

## 2018-07-08 PROCEDURE — 65220000003 HC RM SEMIPRIVATE PSYCH

## 2018-07-08 PROCEDURE — 74011250637 HC RX REV CODE- 250/637: Performed by: PSYCHIATRY & NEUROLOGY

## 2018-07-08 PROCEDURE — 74011250637 HC RX REV CODE- 250/637: Performed by: INTERNAL MEDICINE

## 2018-07-08 RX ADMIN — RISPERIDONE 1 MG: 1 TABLET ORAL at 21:24

## 2018-07-08 RX ADMIN — FAMOTIDINE 20 MG: 20 TABLET ORAL at 10:01

## 2018-07-08 RX ADMIN — MEMANTINE 10 MG: 10 TABLET ORAL at 16:22

## 2018-07-08 RX ADMIN — ASPIRIN 81 MG 81 MG: 81 TABLET ORAL at 09:38

## 2018-07-08 RX ADMIN — MEMANTINE 10 MG: 10 TABLET ORAL at 10:01

## 2018-07-08 RX ADMIN — DILTIAZEM HYDROCHLORIDE 120 MG: 120 TABLET, EXTENDED RELEASE ORAL at 10:01

## 2018-07-08 RX ADMIN — CARVEDILOL 3.12 MG: 3.12 TABLET, FILM COATED ORAL at 10:02

## 2018-07-08 RX ADMIN — CARVEDILOL 3.12 MG: 3.12 TABLET, FILM COATED ORAL at 16:22

## 2018-07-08 RX ADMIN — ZOLPIDEM TARTRATE 5 MG: 5 TABLET ORAL at 21:24

## 2018-07-08 NOTE — PROGRESS NOTES
Patients daughter phoned unit to inquire about this patient. She voiced concern about when or if Social Work has found  out about  Placement into a nursing home local to the family. Daughter plans to phone her mothers  this week.

## 2018-07-08 NOTE — PROGRESS NOTES
Required complete assist to clean up due to large stool, patient was irritable and confused. Direction given to patient about eating and taking meds. Appetite is adequate, hygiene is lacking.

## 2018-07-08 NOTE — PROGRESS NOTES
Problem: Dementia-BEHAVIORAL HEALTH (Adult/Pediatric)  Goal: *STG: Remains safe in hospital  Outcome: Progressing Towards Goal  Pt slept 6 hours. Pt had uneventful night and required no PRN's. Pt had no complaints or signs of distress throughout night. Respirations were unlabored. Continuing to monitor with q15 min rounds for safety.

## 2018-07-08 NOTE — PROGRESS NOTES
Problem: [address] 89 (Adult/Pediatric) Goal: *STG: Attends activities and groups Outcome: Progressing Towards Goal 
Pt oriented to self only. Calm and Cooperative. Appropriate and visible in the milieu. Attending groups. Mood is good and labile. Meds/meal compliant. PRN Ambien given this shift. No behavioral issues. Denies suicidal and homicidal ideations. Denies auditory and visual hallucinations. Will continue to monitor pt with q15 min rounds for safety.

## 2018-07-08 NOTE — BH NOTES
PSYCHIATRIC PROGRESS NOTE         Patient Name  Adriel Mcqueen   Date of Birth 1940   [facility] [id number]   Medical Record Number  [id number]      Age  66 y.o. PCP Berenice Spears, MD   Admit date:  6/17/2018    Room Number  321/01  @ [facility]   Date of Service  7/8/2018           E & M PROGRESS NOTE:         HISTORY       CC:  \"confused, memory impairment\"  HISTORY OF PRESENT ILLNESS/INTERVAL HISTORY:  (reviewed/updated 7/8/2018). per initial evaluation: The patient, Adriel Mcqueen, is a 68 y.o. WHITE [place]  female with a past psychiatric history significant for dementia, who presents at this time with complaints of (and/or evidence of) the following emotional symptoms: memory impairment. Additional symptomatology include disoriented to time and place, poor memory and behavior issues. pt has been incontent and need assistance with ADLs. She is irritable and easily gets loud and labile in mood. She was dc to medical unit for tx of hypotension and now back on U for further tx  The above symptoms have been present for years. These symptoms are of severe severity. These symptoms are constant  in nature. Adriel Mcqueen presents/reports/evidences the following emotional symptoms today, 7/8/2018:memory impairment. The above symptoms have been present for years. These symptoms are of moderate severity. The symptoms are constant  in nature. Additional symptomatology and features include confused , disoriented to place and time. Pt is now accepting her med and need assistance with ADLs due to incontinence. No aggressive behavior and affect is bright , sleep is improving, pleasant and co operative. Received no prn.  6/25/18 she is compliant with medications and no anger issues like before and she gets confused and disoriented   6/26/18 she is doing better and mood is better and no anger issues and no SI or HI and slept well last night  6/27/18 she is the same and she is not engaging well and she has no anger issues and slept fairly last night but she was sleepy this morning and not engaging    6/28- remains confused with memory impairment. Pt has been hallucinating and paranoid- hiding behind her bed stating a women has been raped and stabbed. Received prn med. Sleep is better  6/29- episode of paranoid and mostly sun downing. Appear pleasant in the morning and no recollection of night time paranoia. unsteady gait due to sedation  6/30- pleasant and confused during the daytime, eating her breakfast. Still has paranoid behavior at night  7/1- slow progress as less sundowning and less paranoid. Pleasantly confused during the day time with no sig behavior problem  7/2- confused and disoriented with poor memory. Sun downing and worse in the evening with paranoia. 7/3- paranoid at night about a man coming to her room and killing. Fearful but pt is calm, pleasantly confused during daytime. Accepting med and no aggressive behavior  7/4- no sig behavior issue overnight and sleep is improved. Accepting med. Pleasant confused and with memory impairment  7/5- sleep is poor and remains paranoid at night seeing dead people. Need assistance with ADLs. No behavior issue  7/6- disturbed sleep last nigh when code s was called due to pt not responding- later cancelled as pt's vital were within normal and cxr was wnl to rule out aspiration. Pt is currently in bed responding to command and appear to be in no distress. Poor memory and attention  7/7-Pt was seen in her room with the RN. Presents alert, verbal, oriented to self only. Affect is flat. States she is feeling fine. Prn's used-Ambien 5 mg. No AVH or SI/HI. 7/8- Pt is incontinent of bowel and bladder, becomes irritable during ADL care. No significant changes from yesterday. Awaiting placement. SIDE EFFECTS: (reviewed/updated 7/8/2018)  None reported or admitted to.   No noted toxicity with use of current med   ALLERGIES:(reviewed/updated 7/8/2018)  Allergies Allergen Reactions    Cephalosporins Itching    Codeine Hives    Erythromycin Hives    Nubain [Nalbuphine] Hives    Pcn [Penicillins] Hives    Percodan [Oxycodone Hcl-Oxycodone-Asa] Hives    Promethazine Hives    Protonix [Pantoprazole] Diarrhea    Sulfa (Sulfonamide Antibiotics) Hives    Toradol [Ketorolac] Hives    Ultram [Tramadol] Hives    Valium [Diazepam] Hives    Prilosec [Omeprazole] Itching      MEDICATIONS PRIOR TO ADMISSION:(reviewed/updated 7/8/2018)  Prescriptions Prior to Admission   Medication Sig    multivit-minerals/folic acid (WOMEN'S MULTIVITAMIN GUMMIES PO) Take 1 Tab by mouth daily.  QUEtiapine (SEROQUEL) 25 mg tablet Take 12.5 mg by mouth daily.  carvedilol (COREG) 3.125 mg tablet Take 3.125 mg by mouth two (2) times daily (with meals).  diclofenac (VOLTAREN) 1 % gel Apply 2 g to affected area every six (6) hours as needed (arthritis pain).  acetaminophen (TYLENOL) 325 mg tablet Take 650 mg by mouth every eight (8) hours as needed for Pain.  Menthol (ASPERCREME HEAT) 10 % gel by Apply Externally route every eight (8) hours as needed (arthritis).  aspirin 81 mg chewable tablet Take 81 mg by mouth daily. Indications: myocardial infarction prevention    dilTIAZem XR (DILACOR XR) 120 mg XR capsule Take 120 mg by mouth daily. Indications: hypertension, VENTRICULAR RATE CONTROL IN ATRIAL FIBRILLATION    furosemide (LASIX) 20 mg tablet Take 20 mg by mouth daily as needed. Indications: Edema, hypertension    loratadine (CLARITIN) 10 mg tablet Take 10 mg by mouth daily as needed. Indications: Allergic Rhinitis, SNEEZING    famotidine (PEPCID) 20 mg tablet Take 20 mg by mouth daily. Indications: Heartburn    QUEtiapine (SEROQUEL) 25 mg tablet Take 25 mg by mouth nightly.  Indications: Generalized Anxiety Disorder      PAST MEDICAL HISTORY: Past medical history from the initial psychiatric evaluation has been reviewed (reviewed/updated 7/8/2018) with no additional updates (I asked patient and no additional past medical history provided). No past medical history on file. No past surgical history on file. SOCIAL HISTORY: Social history from the initial psychiatric evaluation has been reviewed (reviewed/updated 7/8/2018) with no additional updates (I asked patient and no additional social history provided). Social History     Social History    Marital status: SINGLE     Spouse name: N/A    Number of children: N/A    Years of education: N/A     Occupational History    Not on file. Social History Main Topics    Smoking status: Not on file    Smokeless tobacco: Not on file    Alcohol use Not on file    Drug use: Not on file    Sexual activity: Not on file     Other Topics Concern    Not on file     Social History Narrative      FAMILY HISTORY: Family history from the initial psychiatric evaluation has been reviewed (reviewed/updated 7/8/2018) with no additional updates (I asked patient and no additional family history provided). No family history on file. REVIEW OF SYSTEMS: (reviewed/updated 7/8/2018)  Appetite:poor   Sleep: decreased more than normal   All other Review of Systems: Psychological ROS: positive for - behavioral disorder, concentration difficulties, disorientation and memory difficulties         [facility]    [facility] FINDINGS ARE WITHIN NORMAL LIMITS (WNL) UNLESS OTHERWISE STATED BELOW. ( ALL OF THE BELOW CATEGORIES OF THE [facility] HAVE BEEN REVIEWED (reviewed 7/8/2018) AND UPDATED AS DEEMED APPROPRIATE )  General Presentation age appropriate and casually dressed, unreliable and vague   Orientation disorganized   Vital Signs  See below (reviewed 7/8/2018); Vital Signs (BP, Pulse, & Temp) are within normal limits if not listed below.    Gait and Station Stable/steady, no ataxia   Musculoskeletal System No extrapyramidal symptoms (EPS); no abnormal muscular movements or Tardive Dyskinesia (TD); muscle strength and tone are within normal limits   Language No aphasia or dysarthria   Speech:  hypoverbal   Thought Processes illogical; slow rate of thoughts; poor abstract reasoning/computation   Thought Associations tangential   Thought Content free of delusions, free of hallucinations and preoccupations   Suicidal Ideations none   Homicidal Ideations none   Mood:  Less anxious   Affect:  anxious and increased in intensity   Memory recent  impaired   Memory remote:  impaired   Concentration/Attention:  distractable   Fund of Knowledge below average   Insight:  poor   Reliability poor   Judgment:  poor          VITALS:     Patient Vitals for the past 24 hrs:   Temp Pulse Resp BP   07/08/18 1622 - 61 - 117/65   07/08/18 1000 - (!) 59 - 143/57   07/08/18 0641 97.5 °F (36.4 °C) (!) 58 16 136/59     Wt Readings from Last 3 Encounters:   06/18/18 54.6 kg (120 lb 4.8 oz)     Temp Readings from Last 3 Encounters:   07/08/18 97.5 °F (36.4 °C)   06/17/18 97.6 °F (36.4 °C)   06/15/18 97.6 °F (36.4 °C)     BP Readings from Last 3 Encounters:   07/08/18 117/65   06/17/18 102/47   06/16/18 99/55     Pulse Readings from Last 3 Encounters:   07/08/18 61   06/17/18 70   06/16/18 81            DATA     LABORATORY DATA:(reviewed/updated 7/8/2018)  No results found for this or any previous visit (from the past 24 hour(s)). No results found for: VALF2, VALAC, VALP, VALPR, DS6, CRBAM, CRBAMP, CARB2, XCRBAM  No results found for: LITHM   RADIOLOGY REPORTS:(reviewed/updated 7/8/2018)  Xr Chest Port    Result Date: 6/17/2018  EXAM:  XR CHEST PORT INDICATION:  Chest Pain COMPARISON:  None. FINDINGS: A portable AP radiograph of the chest was obtained at 720 hours. The patient is on a cardiac monitor. There is minor right basilar atelectasis. There is question of a vague right perihilar airspace process. There is slight diffuse interstitial prominence. Heart size is borderline enlarged. Patient status post median sternotomy. IMPRESSION: 1.  There is suggestion of a vague right perihilar airspace process and follow-up studies would be helpful to assess for interval change. There is minor right basilar atelectasis. MEDICATIONS     ALL MEDICATIONS:   Current Facility-Administered Medications   Medication Dose Route Frequency    risperiDONE (RisperDAL) tablet 1 mg  1 mg Oral QHS    memantine (NAMENDA) tablet 10 mg  10 mg Oral BID    dilTIAZem ER (CARDIZEM LA) tablet 120 mg  120 mg Oral DAILY    ziprasidone (GEODON) 10 mg in sterile water (preservative free) 0.5 mL injection  10 mg IntraMUSCular BID PRN    benztropine (COGENTIN) tablet 1 mg  1 mg Oral BID PRN    benztropine (COGENTIN) injection 1 mg  1 mg IntraMUSCular BID PRN    zolpidem (AMBIEN) tablet 5 mg  5 mg Oral QHS PRN    acetaminophen (TYLENOL) tablet 650 mg  650 mg Oral Q4H PRN    magnesium hydroxide (MILK OF MAGNESIA) 400 mg/5 mL oral suspension 30 mL  30 mL Oral DAILY PRN    nicotine (NICODERM CQ) 21 mg/24 hr patch 1 Patch  1 Patch TransDERmal DAILY PRN    aspirin chewable tablet 81 mg  81 mg Oral DAILY    famotidine (PEPCID) tablet 20 mg  20 mg Oral DAILY    carvedilol (COREG) tablet 3.125 mg  3.125 mg Oral BID WITH MEALS      SCHEDULED MEDICATIONS:   Current Facility-Administered Medications   Medication Dose Route Frequency    risperiDONE (RisperDAL) tablet 1 mg  1 mg Oral QHS    memantine (NAMENDA) tablet 10 mg  10 mg Oral BID    dilTIAZem ER (CARDIZEM LA) tablet 120 mg  120 mg Oral DAILY    aspirin chewable tablet 81 mg  81 mg Oral DAILY    famotidine (PEPCID) tablet 20 mg  20 mg Oral DAILY    carvedilol (COREG) tablet 3.125 mg  3.125 mg Oral BID WITH MEALS          ASSESSMENT & PLAN     DIAGNOSES REQUIRING ACTIVE TREATMENT AND MONITORING: (reviewed/updated 7/8/2018)  Patient Active Hospital Problem List:   Dementia (6/17/2018)    Assessment: moderate to severe- disoriented, poor memory and irritable. Need total care.  No behavior problem and accepting med    Plan: Namenda. - optimize dose- 5 mg bid. Pt has lost her [facility]- will need placement  06/24/18: Continue current treatement  Hypotension  A: recent dc from medical unit- BP is improving  P: ct to monitor by IM- cautious with AP- dc Seroquel  6/25/18 continue same medications   6/26/18 continue same medications   6/27/18 continue same medications    6/28- add Risperdal and titrate. Ct with Namenda  6/29- cautious with prn Zyprexa due to unsteady gait- ct to adjust med  6/30- ct with tx  7/1- ct with tx plan  7/2- titrate Namenda  7/3- increase Risperdal. Ct with Namenda  7/4- ct with tx plan  7/5- titrtae Risperdal to 1 mg HS  7/6- ct with med. Vitals per shift-IM to evaluate following code called last night  In summary, Adriel Mcqueen, is a 66 y.o.  female who presents with a severe exacerbation of the principal diagnosis of Dementia  Patient's condition is worsening/not improving/not stable . Patient requires continued inpatient hospitalization for further stabilization, safety monitoring and medication management. I will continue to coordinate the provision of individual, milieu, occupational, group, and substance abuse therapies to address target symptoms/diagnoses as deemed appropriate for the individual patient. A coordinated, multidisplinary treatment team round was conducted with the patient (this team consists of the nurse, psychiatric unit pharmcist,  and writer). Complete current electronic health record for patient has been reviewed today including consultant notes, ancillary staff notes, nurses and psychiatric tech notes. Suicide risk assessment completed and patient deemed to be of low risk for suicide at this time. The following regarding medications was addressed during rounds with patient:   the risks and benefits of the proposed medication. The patient was given the opportunity to ask questions. Informed consent given to the use of the above medications.  Will continue to adjust psychiatric and non-psychiatric medications (see above \"medication\" section and orders section for details) as deemed appropriate & based upon diagnoses and response to treatment. I will continue to order blood tests/labs and diagnostic tests as deemed appropriate and review results as they become available (see orders for details and above listed lab/test results). I will order psychiatric records from previous [facility] hospitals to further elucidate the nature of patient's psychopathology and review once available. I will gather additional collateral information from friends, family and o/p treatment team to further elucidate the nature of patient's psychopathology and baselline level of psychiatric functioning. I certify that this patient's inpatient psychiatric hospital services furnished since the previous certification were, and continue to be, required for treatment that could reasonably be expected to improve the patient's condition, or for diagnostic study, and that the patient continues to need, on a daily basis, active treatment furnished directly by or requiring the supervision of inpatient psychiatric facility personnel. In addition, the hospital records show that services furnished were intensive treatment services, admission or related services, or equivalent services.     EXPECTED DISCHARGE DATE/DAY: TBD     DISPOSITION: Home/[facility]       Signed By:   Karen Britt MD  7/8/2018

## 2018-07-09 PROCEDURE — 74011250637 HC RX REV CODE- 250/637: Performed by: INTERNAL MEDICINE

## 2018-07-09 PROCEDURE — 65220000003 HC RM SEMIPRIVATE PSYCH

## 2018-07-09 PROCEDURE — 74011250637 HC RX REV CODE- 250/637: Performed by: PSYCHIATRY & NEUROLOGY

## 2018-07-09 RX ADMIN — RISPERIDONE 1 MG: 1 TABLET ORAL at 21:24

## 2018-07-09 RX ADMIN — MEMANTINE 10 MG: 10 TABLET ORAL at 09:47

## 2018-07-09 RX ADMIN — ZOLPIDEM TARTRATE 5 MG: 5 TABLET ORAL at 21:24

## 2018-07-09 RX ADMIN — CARVEDILOL 3.12 MG: 3.12 TABLET, FILM COATED ORAL at 09:47

## 2018-07-09 RX ADMIN — MEMANTINE 10 MG: 10 TABLET ORAL at 17:32

## 2018-07-09 RX ADMIN — CARVEDILOL 3.12 MG: 3.12 TABLET, FILM COATED ORAL at 17:32

## 2018-07-09 RX ADMIN — DILTIAZEM HYDROCHLORIDE 120 MG: 120 TABLET, EXTENDED RELEASE ORAL at 09:47

## 2018-07-09 RX ADMIN — ASPIRIN 81 MG 81 MG: 81 TABLET ORAL at 09:47

## 2018-07-09 RX ADMIN — FAMOTIDINE 20 MG: 20 TABLET ORAL at 09:47

## 2018-07-09 NOTE — PROGRESS NOTES
Patient alert and oriented to person only. Calm and cooperative with staff. Remains confused and disorganized. Visible in milieu for meals and groups. Denies thoughts of harming self or others. Denies auditory, tactile and visual hallucinations. Does not appear to be having paranoid delusions at this time. Patient compliant with meals and medications. Will continue to monitor and assess.

## 2018-07-09 NOTE — BH NOTES
7p-11p Pt is visible in milieu, watches tv with peers however little to no interaction unless approached. Pt is calm and cooperative with staff at this time. Med and meal compliant. Will continue to monitor Q 15 minutes for safety.

## 2018-07-09 NOTE — BH NOTES
Pt observed resting comfortably in bed with eyes closed, breathing even and unlabored. No s/s of distress noted, no complaints voiced. Pt slept 5.75 hours, will continue Q 15 minute monitoring for safety.

## 2018-07-09 NOTE — DISCHARGE SUMMARY
PSYCHIATRIC DISCHARGE SUMMARY         IDENTIFICATION:    Patient Name  Dory Burton   Date of Birth 1940   [facility] [id number]   Medical Record Number  [id number]      Age  66 y.o. PCP Berenice Spears MD   Admit date:  6/15/2018    Discharge date: 7/8/2018   Room Number  321/01  @ [address]   Date of Service  7/8/2018          Patient was not discharged from [facility] but was transferred for overnight from the Psych unit to Medical Unit on the order of covering Hospitalist after patient became hypotensive and needed I/V fluids. She returned to the Psych unit next morning and remains there at present.                                      Signed:  Anders Hendrix MD  7/8/2018

## 2018-07-09 NOTE — PROGRESS NOTES
Initial Nutrition Assessment:    INTERVENTIONS/RECOMMENDATIONS:   · Meals/Snacks: General/healthful diet:  Continue regular diet. ASSESSMENT:   7/9:  Chart reviewed; med noted for dementia on admission. Plans are nursing home placement,  Tolerating regular diet. Had a bout of n/v but this has resolved. Diet Order: Regular  % Eaten:  No data found. Pertinent Medications: [x]Reviewed []Other  Pertinent Labs: [x]Reviewed []Other  Food Allergies: [x]None []Other   Last BM:    [x]Active     []Hyperactive  []Hypoactive       [] Absent BS  Skin:    [x] Intact   [] Incision  [] Breakdown  [] Other:    Anthropometrics:   Height: 5' 1.25\" (155.6 cm) Weight: 54.6 kg (120 lb 4.8 oz)   IBW (%IBW):   ( ) UBW (%UBW):   (  %)   Last Weight Metrics:  Weight Loss Metrics 6/18/2018 6/17/2018   Today's Wt 120 lb 4.8 oz -   BMI - 22.55 kg/m2       BMI: Body mass index is 22.55 kg/(m^2). This BMI is indicative of:   []Underweight    [x]Normal    []Overweight    [] Obesity   [] Extreme Obesity (BMI>40)     Estimated Nutrition Needs (Based on):   1171 Kcals/day (BMR (976) x 1. 2AF) , 55 g (1.0 g/kg bw) Protein  Carbohydrate: At Least 130 g/day  Fluids: 1200 mL/day (1ml/kcal)    NUTRITION DIAGNOSES:   Problem:  No nutritional diagnosis at this time      Etiology: related to       Signs/Symptoms: as evidenced by        NUTRITION INTERVENTIONS:  Meals/Snacks: General/healthful diet                  GOAL:   PO intake >50% of meals next 5-7 days    LEARNING NEEDS (Diet, Food/Nutrient-Drug Interaction):    [x] None Identified   [] Identified and Education Provided/Documented   [] Identified and Pt declined/was not appropriate     Culture[employer], [employer], [place] Ethnic Dietary Needs:    [x] None Identified   [] Identified and Addressed     [x] Interdisciplinary Care Plan Reviewed/Documented    [x] Discharge Planning:   Continue regular diet    MONITORING /EVALUATION:      Food/Nutrient Intake Outcomes:  Total energy intake  Physical Signs/Symptoms Outcomes: Weight/weight change    NUTRITION RISK:    [x] Patient At Nutritional Risk    [] High              [] Moderate/Mild           [x]  Low     [] Patient Not At Nutritional Risk    PT SEEN FOR:    []  MD Consult: []Calorie Count      []Diabetic Diet Education        []Diet Education     []Electrolyte Management     []General Nutrition Management and Supplements     []Management of Tube Feeding     []TPN Recommendations    []  RN Referral:  []MST score >=2     []Enteral/Parenteral Nutrition PTA     []Pregnant: Gestational DM or Multigestation     []Pressure Ulcer/Wound Care needs        []  Low BMI  [x]  Follow-Up      Colonel Lang, [address] [place] [address]  Pager [phone]  Weekend Pager [phone]

## 2018-07-09 NOTE — PROGRESS NOTES
Problem: Dementia-BEHAVIORAL HEALTH (Adult/Pediatric)  Goal: *STG: Remains safe in hospital  Outcome: Progressing Towards Goal  Pt has been isolated to her room most of the time during the shift. Pt was food and med compliant, Pt was appropriate with staff and other pts in the mileu. Pt denied any S/I and H/I at this time, and continues to be on q 15 mins for needs and safety check .

## 2018-07-09 NOTE — BH NOTES
PSYCHIATRIC PROGRESS NOTE         Patient Name  Lamin Ricks   Date of Birth 1940   [facility] [id number]   Medical Record Number  [id number]      Age  66 y.o. PCP Berenice Spears, MD   Admit date:  6/17/2018    Room Number  321/01  @ [facility]   Date of Service  7/9/2018           E & M PROGRESS NOTE:         HISTORY       CC:  \"confused, memory impairment\"  HISTORY OF PRESENT ILLNESS/INTERVAL HISTORY:  (reviewed/updated 7/9/2018). per initial evaluation: The patient, Lamin Ricks, is a 68 y.o. WHITE [place]  female with a past psychiatric history significant for dementia, who presents at this time with complaints of (and/or evidence of) the following emotional symptoms: memory impairment. Additional symptomatology include disoriented to time and place, poor memory and behavior issues. pt has been incontent and need assistance with ADLs. She is irritable and easily gets loud and labile in mood. She was dc to medical unit for tx of hypotension and now back on U for further tx  The above symptoms have been present for years. These symptoms are of severe severity. These symptoms are constant  in nature. Lamin Ricks presents/reports/evidences the following emotional symptoms today, 7/9/2018:memory impairment. The above symptoms have been present for years. These symptoms are of moderate severity. The symptoms are constant  in nature. Additional symptomatology and features include confused , disoriented to place and time. Pt is now accepting her med and need assistance with ADLs due to incontinence. No aggressive behavior and affect is bright , sleep is improving, pleasant and co operative. Received no prn.  6/25/18 she is compliant with medications and no anger issues like before and she gets confused and disoriented   6/26/18 she is doing better and mood is better and no anger issues and no SI or HI and slept well last night  6/27/18 she is the same and she is not engaging well and she has no anger issues and slept fairly last night but she was sleepy this morning and not engaging    6/28- remains confused with memory impairment. Pt has been hallucinating and paranoid- hiding behind her bed stating a women has been raped and stabbed. Received prn med. Sleep is better  6/29- episode of paranoid and mostly sun downing. Appear pleasant in the morning and no recollection of night time paranoia. unsteady gait due to sedation  6/30- pleasant and confused during the daytime, eating her breakfast. Still has paranoid behavior at night  7/1- slow progress as less sundowning and less paranoid. Pleasantly confused during the day time with no sig behavior problem  7/2- confused and disoriented with poor memory. Sun downing and worse in the evening with paranoia. 7/3- paranoid at night about a man coming to her room and killing. Fearful but pt is calm, pleasantly confused during daytime. Accepting med and no aggressive behavior  7/4- no sig behavior issue overnight and sleep is improved. Accepting med. Pleasant confused and with memory impairment  7/5- sleep is poor and remains paranoid at night seeing dead people. Need assistance with ADLs. No behavior issue  7/6- disturbed sleep last nigh when code s was called due to pt not responding- later cancelled as pt's vital were within normal and cxr was wnl to rule out aspiration. Pt is currently in bed responding to command and appear to be in no distress. Poor memory and attention  7/7-Pt was seen in her room with the RN. Presents alert, verbal, oriented to self only. Affect is flat. States she is feeling fine. Prn's used-Ambien 5 mg. No AVH or SI/HI. 7/8- Pt is incontinent of bowel and bladder, becomes irritable during ADL care. No significant changes from yesterday. Awaiting placement. 7/9- remains confused and disoriented to time and place. Pleasant and sleep is better. No behavior problem. Accepting med.        SIDE EFFECTS: (reviewed/updated 7/9/2018)  None reported or admitted to. No noted toxicity with use of current med   ALLERGIES:(reviewed/updated 7/9/2018)  Allergies   Allergen Reactions    Cephalosporins Itching    Codeine Hives    Erythromycin Hives    Nubain [Nalbuphine] Hives    Pcn [Penicillins] Hives    Percodan [Oxycodone Hcl-Oxycodone-Asa] Hives    Promethazine Hives    Protonix [Pantoprazole] Diarrhea    Sulfa (Sulfonamide Antibiotics) Hives    Toradol [Ketorolac] Hives    Ultram [Tramadol] Hives    Valium [Diazepam] Hives    Prilosec [Omeprazole] Itching      MEDICATIONS PRIOR TO ADMISSION:(reviewed/updated 7/9/2018)  Prescriptions Prior to Admission   Medication Sig    multivit-minerals/folic acid (WOMEN'S MULTIVITAMIN GUMMIES PO) Take 1 Tab by mouth daily.  QUEtiapine (SEROQUEL) 25 mg tablet Take 12.5 mg by mouth daily.  carvedilol (COREG) 3.125 mg tablet Take 3.125 mg by mouth two (2) times daily (with meals).  diclofenac (VOLTAREN) 1 % gel Apply 2 g to affected area every six (6) hours as needed (arthritis pain).  acetaminophen (TYLENOL) 325 mg tablet Take 650 mg by mouth every eight (8) hours as needed for Pain.  Menthol (ASPERCREME HEAT) 10 % gel by Apply Externally route every eight (8) hours as needed (arthritis).  aspirin 81 mg chewable tablet Take 81 mg by mouth daily. Indications: myocardial infarction prevention    dilTIAZem XR (DILACOR XR) 120 mg XR capsule Take 120 mg by mouth daily. Indications: hypertension, VENTRICULAR RATE CONTROL IN ATRIAL FIBRILLATION    furosemide (LASIX) 20 mg tablet Take 20 mg by mouth daily as needed. Indications: Edema, hypertension    loratadine (CLARITIN) 10 mg tablet Take 10 mg by mouth daily as needed. Indications: Allergic Rhinitis, SNEEZING    famotidine (PEPCID) 20 mg tablet Take 20 mg by mouth daily. Indications: Heartburn    QUEtiapine (SEROQUEL) 25 mg tablet Take 25 mg by mouth nightly.  Indications: Generalized Anxiety Disorder      PAST MEDICAL HISTORY: Past medical history from the initial psychiatric evaluation has been reviewed (reviewed/updated 7/9/2018) with no additional updates (I asked patient and no additional past medical history provided). No past medical history on file. No past surgical history on file. SOCIAL HISTORY: Social history from the initial psychiatric evaluation has been reviewed (reviewed/updated 7/9/2018) with no additional updates (I asked patient and no additional social history provided). Social History     Social History    Marital status: SINGLE     Spouse name: N/A    Number of children: N/A    Years of education: N/A     Occupational History    Not on file. Social History Main Topics    Smoking status: Not on file    Smokeless tobacco: Not on file    Alcohol use Not on file    Drug use: Not on file    Sexual activity: Not on file     Other Topics Concern    Not on file     Social History Narrative      FAMILY HISTORY: Family history from the initial psychiatric evaluation has been reviewed (reviewed/updated 7/9/2018) with no additional updates (I asked patient and no additional family history provided). No family history on file. REVIEW OF SYSTEMS: (reviewed/updated 7/9/2018)  Appetite:poor   Sleep: decreased more than normal   All other Review of Systems: Psychological ROS: positive for - behavioral disorder, concentration difficulties, disorientation and memory difficulties         [facility] (MSE):    MSE FINDINGS ARE WITHIN NORMAL LIMITS (WNL) UNLESS OTHERWISE STATED BELOW. ( ALL OF THE BELOW CATEGORIES OF THE MSE HAVE BEEN REVIEWED (reviewed 7/9/2018) AND UPDATED AS DEEMED APPROPRIATE )  General Presentation age appropriate and casually dressed, unreliable and vague   Orientation disorganized   Vital Signs  See below (reviewed 7/9/2018); Vital Signs (BP, Pulse, & Temp) are within normal limits if not listed below.    Gait and Station Stable/steady, no ataxia Musculoskeletal System No extrapyramidal symptoms (EPS); no abnormal muscular movements or Tardive Dyskinesia (TD); muscle strength and tone are within normal limits   Language No aphasia or dysarthria   Speech:  hypoverbal   Thought Processes illogical; slow rate of thoughts; poor abstract reasoning/computation   Thought Associations tangential   Thought Content free of delusions, free of hallucinations and preoccupations   Suicidal Ideations none   Homicidal Ideations none   Mood:  Less anxious   Affect:  anxious and increased in intensity   Memory recent  impaired   Memory remote:  impaired   Concentration/Attention:  distractable   Fund of Knowledge below average   Insight:  poor   Reliability poor   Judgment:  poor          VITALS:     Patient Vitals for the past 24 hrs:   Pulse BP   07/08/18 1622 61 117/65   07/08/18 1000 (!) 59 143/57     Wt Readings from Last 3 Encounters:   06/18/18 54.6 kg (120 lb 4.8 oz)     Temp Readings from Last 3 Encounters:   07/08/18 97.5 °F (36.4 °C)   06/17/18 97.6 °F (36.4 °C)   06/15/18 97.6 °F (36.4 °C)     BP Readings from Last 3 Encounters:   07/08/18 117/65   06/17/18 102/47   06/16/18 99/55     Pulse Readings from Last 3 Encounters:   07/08/18 61   06/17/18 70   06/16/18 81            DATA     LABORATORY DATA:(reviewed/updated 7/9/2018)  No results found for this or any previous visit (from the past 24 hour(s)). No results found for: VALF2, VALAC, VALP, VALPR, DS6, CRBAM, CRBAMP, CARB2, XCRBAM  No results found for: LITHM   RADIOLOGY REPORTS:(reviewed/updated 7/9/2018)  Xr Chest Port    Result Date: 6/17/2018  EXAM:  XR CHEST PORT INDICATION:  Chest Pain COMPARISON:  None. FINDINGS: A portable AP radiograph of the chest was obtained at 720 hours. The patient is on a cardiac monitor. There is minor right basilar atelectasis. There is question of a vague right perihilar airspace process. There is slight diffuse interstitial prominence. Heart size is borderline enlarged. Patient status post median sternotomy. IMPRESSION: 1. There is suggestion of a vague right perihilar airspace process and follow-up studies would be helpful to assess for interval change. There is minor right basilar atelectasis. MEDICATIONS     ALL MEDICATIONS:   Current Facility-Administered Medications   Medication Dose Route Frequency    risperiDONE (RisperDAL) tablet 1 mg  1 mg Oral QHS    memantine (NAMENDA) tablet 10 mg  10 mg Oral BID    dilTIAZem ER (CARDIZEM LA) tablet 120 mg  120 mg Oral DAILY    ziprasidone (GEODON) 10 mg in sterile water (preservative free) 0.5 mL injection  10 mg IntraMUSCular BID PRN    benztropine (COGENTIN) tablet 1 mg  1 mg Oral BID PRN    benztropine (COGENTIN) injection 1 mg  1 mg IntraMUSCular BID PRN    zolpidem (AMBIEN) tablet 5 mg  5 mg Oral QHS PRN    acetaminophen (TYLENOL) tablet 650 mg  650 mg Oral Q4H PRN    magnesium hydroxide (MILK OF MAGNESIA) 400 mg/5 mL oral suspension 30 mL  30 mL Oral DAILY PRN    nicotine (NICODERM CQ) 21 mg/24 hr patch 1 Patch  1 Patch TransDERmal DAILY PRN    aspirin chewable tablet 81 mg  81 mg Oral DAILY    famotidine (PEPCID) tablet 20 mg  20 mg Oral DAILY    carvedilol (COREG) tablet 3.125 mg  3.125 mg Oral BID WITH MEALS      SCHEDULED MEDICATIONS:   Current Facility-Administered Medications   Medication Dose Route Frequency    risperiDONE (RisperDAL) tablet 1 mg  1 mg Oral QHS    memantine (NAMENDA) tablet 10 mg  10 mg Oral BID    dilTIAZem ER (CARDIZEM LA) tablet 120 mg  120 mg Oral DAILY    aspirin chewable tablet 81 mg  81 mg Oral DAILY    famotidine (PEPCID) tablet 20 mg  20 mg Oral DAILY    carvedilol (COREG) tablet 3.125 mg  3.125 mg Oral BID WITH MEALS          ASSESSMENT & PLAN     DIAGNOSES REQUIRING ACTIVE TREATMENT AND MONITORING: (reviewed/updated 7/9/2018)  Patient Active Hospital Problem List:   Dementia (6/17/2018)    Assessment: moderate to severe- disoriented, poor memory and irritable. Need total care. No behavior problem and accepting med    Plan:  Yash Wells. - optimize dose- 5 mg bid. Pt has lost her VICENTE- will need placement  06/24/18: Continue current treatement  Hypotension  A: recent dc from medical unit- BP is improving  P: ct to monitor by IM- cautious with AP- dc Seroquel  6/25/18 continue same medications   6/26/18 continue same medications   6/27/18 continue same medications    6/28- add Risperdal and titrate. Ct with Namenda  6/29- cautious with prn Zyprexa due to unsteady gait- ct to adjust med  6/30- ct with tx  7/1- ct with tx plan  7/2- titrate Namenda  7/3- increase Risperdal. Ct with Namenda  7/4- ct with tx plan  7/5- titrtae Risperdal to 1 mg HS  7/6- ct with med. Vitals per shift-IM to evaluate following code called last night  7/9- ct with tx- need placement. In summary, Jordan Jennings, is a 66 y.o.  female who presents with a severe exacerbation of the principal diagnosis of Dementia  Patient's condition is worsening/not improving/not stable . Patient requires continued inpatient hospitalization for further stabilization, safety monitoring and medication management. I will continue to coordinate the provision of individual, milieu, occupational, group, and substance abuse therapies to address target symptoms/diagnoses as deemed appropriate for the individual patient. A coordinated, multidisplinary treatment team round was conducted with the patient (this team consists of the nurse, psychiatric unit pharmcist,  and writer). Complete current electronic health record for patient has been reviewed today including consultant notes, ancillary staff notes, nurses and psychiatric tech notes. Suicide risk assessment completed and patient deemed to be of low risk for suicide at this time. The following regarding medications was addressed during rounds with patient:   the risks and benefits of the proposed medication.  The patient was given the opportunity to ask questions. Informed consent given to the use of the above medications. Will continue to adjust psychiatric and non-psychiatric medications (see above \"medication\" section and orders section for details) as deemed appropriate & based upon diagnoses and response to treatment. I will continue to order blood tests/labs and diagnostic tests as deemed appropriate and review results as they become available (see orders for details and above listed lab/test results). I will order psychiatric records from previous [facility] hospitals to further elucidate the nature of patient's psychopathology and review once available. I will gather additional collateral information from friends, family and o/p treatment team to further elucidate the nature of patient's psychopathology and baselline level of psychiatric functioning. I certify that this patient's inpatient psychiatric hospital services furnished since the previous certification were, and continue to be, required for treatment that could reasonably be expected to improve the patient's condition, or for diagnostic study, and that the patient continues to need, on a daily basis, active treatment furnished directly by or requiring the supervision of inpatient psychiatric facility personnel. In addition, the hospital records show that services furnished were intensive treatment services, admission or related services, or equivalent services.     EXPECTED DISCHARGE DATE/DAY: TBD     DISPOSITION: Home/[facility]E       Signed By:   Shamar Gillespie MD  7/9/2018

## 2018-07-10 PROCEDURE — 74011250637 HC RX REV CODE- 250/637: Performed by: PSYCHIATRY & NEUROLOGY

## 2018-07-10 PROCEDURE — 74011250637 HC RX REV CODE- 250/637: Performed by: INTERNAL MEDICINE

## 2018-07-10 PROCEDURE — 65220000003 HC RM SEMIPRIVATE PSYCH

## 2018-07-10 RX ADMIN — CARVEDILOL 3.12 MG: 3.12 TABLET, FILM COATED ORAL at 08:44

## 2018-07-10 RX ADMIN — DILTIAZEM HYDROCHLORIDE 120 MG: 120 TABLET, EXTENDED RELEASE ORAL at 08:44

## 2018-07-10 RX ADMIN — MEMANTINE 10 MG: 10 TABLET ORAL at 08:44

## 2018-07-10 RX ADMIN — FAMOTIDINE 20 MG: 20 TABLET ORAL at 08:44

## 2018-07-10 RX ADMIN — ASPIRIN 81 MG 81 MG: 81 TABLET ORAL at 08:44

## 2018-07-10 RX ADMIN — RISPERIDONE 1 MG: 1 TABLET ORAL at 21:26

## 2018-07-10 RX ADMIN — ZOLPIDEM TARTRATE 5 MG: 5 TABLET ORAL at 21:26

## 2018-07-10 RX ADMIN — CARVEDILOL 3.12 MG: 3.12 TABLET, FILM COATED ORAL at 17:01

## 2018-07-10 RX ADMIN — MEMANTINE 10 MG: 10 TABLET ORAL at 17:01

## 2018-07-10 NOTE — BH NOTES
Pt is oriented only to self.  Pt is attending groups and meal compliant.  Pt spends time in milieu with staff and peers - was coloring cat pictures and watching tv.  Pt's sleep remains poor and becomes paranoid about seeing dead people in evenings. No behavior issues.  sent pt's UAI, H&P and MAR to [facility], [facility] in [place], [place] [id number] and [place], [address] in [facility], [address] in [place], and [place] in [facility]. Pt was not accepted at [facility] in [address] [facility] [address]. Awaiting call from [facility] in [facility]. Family was updated and will be visiting [facility] on Friday. The social work department will continue to coordinate plans for discharge.

## 2018-07-10 NOTE — BH NOTES
GROUP THERAPY PROGRESS NOTE    The patient Maria Isabel lew 66 y.o. female is participating in [employer]. Group time: 45 minutes    Personal goal for participation: To participate in mental health [employer] game    Goal orientation:  personal    Group therapy participation: active    Therapeutic interventions reviewed and discussed: choices in recovery    Impression of participation:  The patient was attentive.     Edmonia Soulier Baker  7/10/2018  1:34 PM

## 2018-07-10 NOTE — BH NOTES
PSYCHIATRIC PROGRESS NOTE         Patient Name  Nisreen Velasquez   Date of Birth 1940   [facility] [id number]   Medical Record Number  [id number]      Age  66 y.o. PCP Berenice Spears, MD   Admit date:  6/17/2018    Room Number  321/01  @ [facility]   Date of Service  7/10/2018           E & M PROGRESS NOTE:         HISTORY       CC:  \"confused, memory impairment\"  HISTORY OF PRESENT ILLNESS/INTERVAL HISTORY:  (reviewed/updated 7/10/2018). per initial evaluation: The patient, Nisreen Velasquez, is a 68 y.o. WHITE [place]  female with a past psychiatric history significant for dementia, who presents at this time with complaints of (and/or evidence of) the following emotional symptoms: memory impairment. Additional symptomatology include disoriented to time and place, poor memory and behavior issues. pt has been incontent and need assistance with ADLs. She is irritable and easily gets loud and labile in mood. She was dc to medical unit for tx of hypotension and now back on U for further tx  The above symptoms have been present for years. These symptoms are of severe severity. These symptoms are constant  in nature. Nisreen Velasquez presents/reports/evidences the following emotional symptoms today, 7/10/2018:memory impairment. The above symptoms have been present for years. These symptoms are of moderate severity. The symptoms are constant  in nature. Additional symptomatology and features include confused , disoriented to place and time. Pt is now accepting her med and need assistance with ADLs due to incontinence. No aggressive behavior and affect is bright , sleep is improving, pleasant and co operative. Received no prn.  6/25/18 she is compliant with medications and no anger issues like before and she gets confused and disoriented   6/26/18 she is doing better and mood is better and no anger issues and no SI or HI and slept well last night  6/27/18 she is the same and she is not engaging well and she has no anger issues and slept fairly last night but she was sleepy this morning and not engaging    6/28- remains confused with memory impairment. Pt has been hallucinating and paranoid- hiding behind her bed stating a women has been raped and stabbed. Received prn med. Sleep is better  6/29- episode of paranoid and mostly sun downing. Appear pleasant in the morning and no recollection of night time paranoia. unsteady gait due to sedation  6/30- pleasant and confused during the daytime, eating her breakfast. Still has paranoid behavior at night  7/1- slow progress as less sundowning and less paranoid. Pleasantly confused during the day time with no sig behavior problem  7/2- confused and disoriented with poor memory. Sun downing and worse in the evening with paranoia. 7/3- paranoid at night about a man coming to her room and killing. Fearful but pt is calm, pleasantly confused during daytime. Accepting med and no aggressive behavior  7/4- no sig behavior issue overnight and sleep is improved. Accepting med. Pleasant confused and with memory impairment  7/5- sleep is poor and remains paranoid at night seeing dead people. Need assistance with ADLs. No behavior issue  7/6- disturbed sleep last nigh when code s was called due to pt not responding- later cancelled as pt's vital were within normal and cxr was wnl to rule out aspiration. Pt is currently in bed responding to command and appear to be in no distress. Poor memory and attention  7/7-Pt was seen in her room with the RN. Presents alert, verbal, oriented to self only. Affect is flat. States she is feeling fine. Prn's used-Ambien 5 mg. No AVH or SI/HI. 7/8- Pt is incontinent of bowel and bladder, becomes irritable during ADL care. No significant changes from yesterday. Awaiting placement. 7/9- remains confused and disoriented to time and place. Pleasant and sleep is better. No behavior problem. Accepting med.    7/10- remains pleasant and confused with no behavior problem. Accepting med. Sleep is better. Poor memory and disoriented to time/place. SIDE EFFECTS: (reviewed/updated 7/10/2018)  None reported or admitted to. No noted toxicity with use of current med   ALLERGIES:(reviewed/updated 7/10/2018)  Allergies   Allergen Reactions    Cephalosporins Itching    Codeine Hives    Erythromycin Hives    Nubain [Nalbuphine] Hives    Pcn [Penicillins] Hives    Percodan [Oxycodone Hcl-Oxycodone-Asa] Hives    Promethazine Hives    Protonix [Pantoprazole] Diarrhea    Sulfa (Sulfonamide Antibiotics) Hives    Toradol [Ketorolac] Hives    Ultram [Tramadol] Hives    Valium [Diazepam] Hives    Prilosec [Omeprazole] Itching      MEDICATIONS PRIOR TO ADMISSION:(reviewed/updated 7/10/2018)  Prescriptions Prior to Admission   Medication Sig    multivit-minerals/folic acid (WOMEN'S MULTIVITAMIN GUMMIES PO) Take 1 Tab by mouth daily.  QUEtiapine (SEROQUEL) 25 mg tablet Take 12.5 mg by mouth daily.  carvedilol (COREG) 3.125 mg tablet Take 3.125 mg by mouth two (2) times daily (with meals).  diclofenac (VOLTAREN) 1 % gel Apply 2 g to affected area every six (6) hours as needed (arthritis pain).  acetaminophen (TYLENOL) 325 mg tablet Take 650 mg by mouth every eight (8) hours as needed for Pain.  Menthol (ASPERCREME HEAT) 10 % gel by Apply Externally route every eight (8) hours as needed (arthritis).  aspirin 81 mg chewable tablet Take 81 mg by mouth daily. Indications: myocardial infarction prevention    dilTIAZem XR (DILACOR XR) 120 mg XR capsule Take 120 mg by mouth daily. Indications: hypertension, VENTRICULAR RATE CONTROL IN ATRIAL FIBRILLATION    furosemide (LASIX) 20 mg tablet Take 20 mg by mouth daily as needed. Indications: Edema, hypertension    loratadine (CLARITIN) 10 mg tablet Take 10 mg by mouth daily as needed. Indications: Allergic Rhinitis, SNEEZING    famotidine (PEPCID) 20 mg tablet Take 20 mg by mouth daily.  Indications: Heartburn    QUEtiapine (SEROQUEL) 25 mg tablet Take 25 mg by mouth nightly. Indications: Generalized Anxiety Disorder      PAST MEDICAL HISTORY: Past medical history from the initial psychiatric evaluation has been reviewed (reviewed/updated 7/10/2018) with no additional updates (I asked patient and no additional past medical history provided). No past medical history on file. No past surgical history on file. SOCIAL HISTORY: Social history from the initial psychiatric evaluation has been reviewed (reviewed/updated 7/10/2018) with no additional updates (I asked patient and no additional social history provided). Social History     Social History    Marital status: SINGLE     Spouse name: N/A    Number of children: N/A    Years of education: N/A     Occupational History    Not on file. Social History Main Topics    Smoking status: Not on file    Smokeless tobacco: Not on file    Alcohol use Not on file    Drug use: Not on file    Sexual activity: Not on file     Other Topics Concern    Not on file     Social History Narrative      FAMILY HISTORY: Family history from the initial psychiatric evaluation has been reviewed (reviewed/updated 7/10/2018) with no additional updates (I asked patient and no additional family history provided). No family history on file.     REVIEW OF SYSTEMS: (reviewed/updated 7/10/2018)  Appetite:poor   Sleep: decreased more than normal   All other Review of Systems: Psychological ROS: positive for - behavioral disorder, concentration difficulties, disorientation and memory difficulties         [facility] [place]    MSE FINDINGS ARE WITHIN NORMAL LIMITS (WNL) UNLESS OTHERWISE STATED BELOW. ( ALL OF THE BELOW CATEGORIES OF THE MSE HAVE BEEN REVIEWED (reviewed 7/10/2018) AND UPDATED AS DEEMED APPROPRIATE )  General Presentation age appropriate and casually dressed, unreliable and vague   Orientation disorganized   Vital Signs  See below (reviewed 7/10/2018); Vital Signs (BP, Pulse, & Temp) are within normal limits if not listed below. Gait and Station Stable/steady, no ataxia   Musculoskeletal System No extrapyramidal symptoms (EPS); no abnormal muscular movements or Tardive Dyskinesia (TD); muscle strength and tone are within normal limits   Language No aphasia or dysarthria   Speech:  hypoverbal   Thought Processes illogical; slow rate of thoughts; poor abstract reasoning/computation   Thought Associations tangential   Thought Content free of delusions, free of hallucinations and preoccupations   Suicidal Ideations none   Homicidal Ideations none   Mood:  Less anxious   Affect:  anxious and increased in intensity   Memory recent  impaired   Memory remote:  impaired   Concentration/Attention:  distractable   Fund of Knowledge below average   Insight:  poor   Reliability poor   Judgment:  poor          VITALS:     Patient Vitals for the past 24 hrs:   Temp Pulse Resp BP   07/10/18 0844 - 64 - 133/61   07/10/18 0653 97.4 °F (36.3 °C) (!) 55 16 125/47   07/09/18 1732 - 65 - 125/57   07/09/18 1500 - 67 18 136/60     Wt Readings from Last 3 Encounters:   06/18/18 54.6 kg (120 lb 4.8 oz)     Temp Readings from Last 3 Encounters:   07/10/18 97.4 °F (36.3 °C)   06/17/18 97.6 °F (36.4 °C)   06/15/18 97.6 °F (36.4 °C)     BP Readings from Last 3 Encounters:   07/10/18 133/61   06/17/18 102/47   06/16/18 99/55     Pulse Readings from Last 3 Encounters:   07/10/18 64   06/17/18 70   06/16/18 81            DATA     LABORATORY DATA:(reviewed/updated 7/10/2018)  No results found for this or any previous visit (from the past 24 hour(s)). No results found for: VALF2, VALAC, VALP, VALPR, DS6, CRBAM, CRBAMP, CARB2, XCRBAM  No results found for: LITHM   RADIOLOGY REPORTS:(reviewed/updated 7/10/2018)  Xr Chest Port    Result Date: 6/17/2018  EXAM:  XR CHEST PORT INDICATION:  Chest Pain COMPARISON:  None.  FINDINGS: A portable AP radiograph of the chest was obtained at 720 hours. The patient is on a cardiac monitor. There is minor right basilar atelectasis. There is question of a vague right perihilar airspace process. There is slight diffuse interstitial prominence. Heart size is borderline enlarged. Patient status post median sternotomy. IMPRESSION: 1. There is suggestion of a vague right perihilar airspace process and follow-up studies would be helpful to assess for interval change. There is minor right basilar atelectasis.           MEDICATIONS     ALL MEDICATIONS:   Current Facility-Administered Medications   Medication Dose Route Frequency    risperiDONE (RisperDAL) tablet 1 mg  1 mg Oral QHS    memantine (NAMENDA) tablet 10 mg  10 mg Oral BID    dilTIAZem ER (CARDIZEM LA) tablet 120 mg  120 mg Oral DAILY    ziprasidone (GEODON) 10 mg in sterile water (preservative free) 0.5 mL injection  10 mg IntraMUSCular BID PRN    benztropine (COGENTIN) tablet 1 mg  1 mg Oral BID PRN    benztropine (COGENTIN) injection 1 mg  1 mg IntraMUSCular BID PRN    zolpidem (AMBIEN) tablet 5 mg  5 mg Oral QHS PRN    acetaminophen (TYLENOL) tablet 650 mg  650 mg Oral Q4H PRN    magnesium hydroxide (MILK OF MAGNESIA) 400 mg/5 mL oral suspension 30 mL  30 mL Oral DAILY PRN    nicotine (NICODERM CQ) 21 mg/24 hr patch 1 Patch  1 Patch TransDERmal DAILY PRN    aspirin chewable tablet 81 mg  81 mg Oral DAILY    famotidine (PEPCID) tablet 20 mg  20 mg Oral DAILY    carvedilol (COREG) tablet 3.125 mg  3.125 mg Oral BID WITH MEALS      SCHEDULED MEDICATIONS:   Current Facility-Administered Medications   Medication Dose Route Frequency    risperiDONE (RisperDAL) tablet 1 mg  1 mg Oral QHS    memantine (NAMENDA) tablet 10 mg  10 mg Oral BID    dilTIAZem ER (CARDIZEM LA) tablet 120 mg  120 mg Oral DAILY    aspirin chewable tablet 81 mg  81 mg Oral DAILY    famotidine (PEPCID) tablet 20 mg  20 mg Oral DAILY    carvedilol (COREG) tablet 3.125 mg  3.125 mg Oral BID WITH MEALS ASSESSMENT & PLAN     DIAGNOSES REQUIRING ACTIVE TREATMENT AND MONITORING: (reviewed/updated 7/10/2018)  Patient Active Hospital Problem List:   Dementia (6/17/2018)    Assessment: moderate to severe- disoriented, poor memory and irritable. Need total care. No behavior problem and accepting med    Plan:  Ed Nash - optimize dose- 5 mg bid. Pt has lost her VICENTE- will need placement  06/24/18: Continue current treatement  Hypotension  A: recent dc from medical unit- BP is improving    7/10- placement- ct with current tx plan    In summary, Una Go, is a 66 y.o.  female who presents with a severe exacerbation of the principal diagnosis of Dementia  Patient's condition is /not stable . Patient requires continued inpatient hospitalization for further stabilization, safety monitoring and medication management. I will continue to coordinate the provision of individual, milieu, occupational, group, and substance abuse therapies to address target symptoms/diagnoses as deemed appropriate for the individual patient. A coordinated, multidisplinary treatment team round was conducted with the patient (this team consists of the nurse, psychiatric unit pharmcist,  and writer). Complete current electronic health record for patient has been reviewed today including consultant notes, ancillary staff notes, nurses and psychiatric tech notes. Suicide risk assessment completed and patient deemed to be of low risk for suicide at this time. The following regarding medications was addressed during rounds with patient:   the risks and benefits of the proposed medication. The patient was given the opportunity to ask questions. Informed consent given to the use of the above medications. Will continue to adjust psychiatric and non-psychiatric medications (see above \"medication\" section and orders section for details) as deemed appropriate & based upon diagnoses and response to treatment.      I will continue to order blood tests/labs and diagnostic tests as deemed appropriate and review results as they become available (see orders for details and above listed lab/test results). I will order psychiatric records from previous [facility] hospitals to further elucidate the nature of patient's psychopathology and review once available. I will gather additional collateral information from friends, family and o/p treatment team to further elucidate the nature of patient's psychopathology and baselline level of psychiatric functioning. I certify that this patient's inpatient psychiatric hospital services furnished since the previous certification were, and continue to be, required for treatment that could reasonably be expected to improve the patient's condition, or for diagnostic study, and that the patient continues to need, on a daily basis, active treatment furnished directly by or requiring the supervision of inpatient psychiatric facility personnel. In addition, the hospital records show that services furnished were intensive treatment services, admission or related services, or equivalent services.     EXPECTED DISCHARGE DATE/DAY: TBD     DISPOSITION: Home/[facility]       Signed By:   Main Hester MD  7/10/2018

## 2018-07-10 NOTE — PROGRESS NOTES
Problem: Dementia-BEHAVIORAL HEALTH (Adult/Pediatric)  Goal: *LTG: Obtains optimum level of functioning  Outcome: Progressing Towards Goal  Pt intermittently visible and interactive on unit. She was found to be incontinent of urine and stool. Provided assistance to clean up. Pt makes no eye contact. She is oriented to person and current president but answers \"I don't care\" to other orientation questions. Affect is flat, mood is depressed. Hygiene is fair, requires frequent toileting and encouragement with ADLs. Gait is slow with generalized weakness using handrails but she is able to maneuver in room and hallways. Encouraged patient to utilize handrails and. Appetite patterns WNL, staff cut up food for her. She was visited by Chirag Alcantara,  for [facility] on the [place] who can be reached at [phone]. Currently resting in bed. Will continue to offer q 1 hr toileting and monitor for safety with q 15 min checks.

## 2018-07-10 NOTE — BH NOTES
Pt observed resting quietly in bed with eyes closed, respirations even and unlabored. No s/s distress noted, no complaints voiced. Pt slept 6 hours. Will continue Q 15 minute monitoring for safety.

## 2018-07-10 NOTE — BH NOTES
GROUP THERAPY PROGRESS NOTE    The patient Mariana lew 66 y.o. female is participating in Creative Expression Group. Group time: 1 hour    Personal goal for participation:  To concentrate on selected task    Goal orientation: social    Group therapy participation: minimal    Therapeutic interventions reviewed and discussed: Crafts, games, music    Impression of participation: The patient was present-elected to watch-pleasant upon approach    Harriet Cook  7/10/2018  5:36 PM

## 2018-07-11 PROCEDURE — 74011250637 HC RX REV CODE- 250/637: Performed by: INTERNAL MEDICINE

## 2018-07-11 PROCEDURE — 74011250637 HC RX REV CODE- 250/637: Performed by: PSYCHIATRY & NEUROLOGY

## 2018-07-11 PROCEDURE — 65220000003 HC RM SEMIPRIVATE PSYCH

## 2018-07-11 RX ADMIN — MEMANTINE 10 MG: 10 TABLET ORAL at 17:23

## 2018-07-11 RX ADMIN — CARVEDILOL 3.12 MG: 3.12 TABLET, FILM COATED ORAL at 17:23

## 2018-07-11 RX ADMIN — DILTIAZEM HYDROCHLORIDE 120 MG: 120 TABLET, EXTENDED RELEASE ORAL at 07:53

## 2018-07-11 RX ADMIN — ASPIRIN 81 MG 81 MG: 81 TABLET ORAL at 07:53

## 2018-07-11 RX ADMIN — CARVEDILOL 3.12 MG: 3.12 TABLET, FILM COATED ORAL at 07:53

## 2018-07-11 RX ADMIN — MEMANTINE 10 MG: 10 TABLET ORAL at 07:53

## 2018-07-11 RX ADMIN — RISPERIDONE 1 MG: 1 TABLET ORAL at 21:23

## 2018-07-11 RX ADMIN — FAMOTIDINE 20 MG: 20 TABLET ORAL at 07:53

## 2018-07-11 NOTE — PROGRESS NOTES
Ms. Marquisesilvia Horn was present in Spirituality Group about Spiritual Gifts on [address] [facility] unit    [address] [facility], .Div.    Paging Service [phone])

## 2018-07-11 NOTE — BH NOTES
GROUP THERAPY PROGRESS NOTE    The patient Chloe Im a 66 y.o. female is participating in Unruly Â®. Group time: 45 minutes    Personal goal for participation:  To participate in happiness game    Goal orientation:  personal    Group therapy participation: active    Therapeutic interventions reviewed and discussed: life scenarios exploring the pursuit of happiness    Impression of participation:  The patient was attentive-required prompting    Verlan Schirmer  7/11/2018  1:18 PM

## 2018-07-11 NOTE — BH NOTES
PSYCHIATRIC PROGRESS NOTE         Patient Name  Anastasia Pillai   Date of Birth 1940   [facility] [id number]   Medical Record Number  [id number]      Age  66 y.o. PCP Berenice Spears, MD   Admit date:  6/17/2018    Room Number  321/01  @ [facility]   Date of Service  7/11/2018           E & M PROGRESS NOTE:         HISTORY       CC:  \"confused, memory impairment\"  HISTORY OF PRESENT ILLNESS/INTERVAL HISTORY:  (reviewed/updated 7/11/2018). per initial evaluation: The patient, Anastasia Pillai, is a 68 y.o. WHITE [place]  female with a past psychiatric history significant for dementia, who presents at this time with complaints of (and/or evidence of) the following emotional symptoms: memory impairment. Additional symptomatology include disoriented to time and place, poor memory and behavior issues. pt has been incontent and need assistance with ADLs. She is irritable and easily gets loud and labile in mood. She was dc to medical unit for tx of hypotension and now back on U for further tx  The above symptoms have been present for years. These symptoms are of severe severity. These symptoms are constant  in nature. Anastasia Pillai presents/reports/evidences the following emotional symptoms today, 7/11/2018:memory impairment. The above symptoms have been present for years. These symptoms are of moderate severity. The symptoms are constant  in nature. Additional symptomatology and features include confused , disoriented to place and time. Pt is now accepting her med and need assistance with ADLs due to incontinence. No aggressive behavior and affect is bright , sleep is improving, pleasant and co operative. Received no prn.  6/25/18 she is compliant with medications and no anger issues like before and she gets confused and disoriented   6/26/18 she is doing better and mood is better and no anger issues and no SI or HI and slept well last night  6/27/18 she is the same and she is not engaging well and she has no anger issues and slept fairly last night but she was sleepy this morning and not engaging    6/28- remains confused with memory impairment. Pt has been hallucinating and paranoid- hiding behind her bed stating a women has been raped and stabbed. Received prn med. Sleep is better  6/29- episode of paranoid and mostly sun downing. Appear pleasant in the morning and no recollection of night time paranoia. unsteady gait due to sedation  6/30- pleasant and confused during the daytime, eating her breakfast. Still has paranoid behavior at night  7/1- slow progress as less sundowning and less paranoid. Pleasantly confused during the day time with no sig behavior problem  7/2- confused and disoriented with poor memory. Sun downing and worse in the evening with paranoia. 7/3- paranoid at night about a man coming to her room and killing. Fearful but pt is calm, pleasantly confused during daytime. Accepting med and no aggressive behavior  7/4- no sig behavior issue overnight and sleep is improved. Accepting med. Pleasant confused and with memory impairment  7/5- sleep is poor and remains paranoid at night seeing dead people. Need assistance with ADLs. No behavior issue  7/6- disturbed sleep last nigh when code s was called due to pt not responding- later cancelled as pt's vital were within normal and cxr was wnl to rule out aspiration. Pt is currently in bed responding to command and appear to be in no distress. Poor memory and attention  7/7-Pt was seen in her room with the RN. Presents alert, verbal, oriented to self only. Affect is flat. States she is feeling fine. Prn's used-Ambien 5 mg. No AVH or SI/HI. 7/8- Pt is incontinent of bowel and bladder, becomes irritable during ADL care. No significant changes from yesterday. Awaiting placement. 7/9- remains confused and disoriented to time and place. Pleasant and sleep is better. No behavior problem. Accepting med.    7/10- remains pleasant and confused with no behavior problem. Accepting med. Sleep is better. Poor memory and disoriented to time/place. 7/11- pleasant and co operative during the interview. She remains confused with poor memory. No delusional themes and sleep is improving. SIDE EFFECTS: (reviewed/updated 7/11/2018)  None reported or admitted to. No noted toxicity with use of current med   ALLERGIES:(reviewed/updated 7/11/2018)  Allergies   Allergen Reactions    Cephalosporins Itching    Codeine Hives    Erythromycin Hives    Nubain [Nalbuphine] Hives    Pcn [Penicillins] Hives    Percodan [Oxycodone Hcl-Oxycodone-Asa] Hives    Promethazine Hives    Protonix [Pantoprazole] Diarrhea    Sulfa (Sulfonamide Antibiotics) Hives    Toradol [Ketorolac] Hives    Ultram [Tramadol] Hives    Valium [Diazepam] Hives    Prilosec [Omeprazole] Itching      MEDICATIONS PRIOR TO ADMISSION:(reviewed/updated 7/11/2018)  Prescriptions Prior to Admission   Medication Sig    multivit-minerals/folic acid (WOMEN'S MULTIVITAMIN GUMMIES PO) Take 1 Tab by mouth daily.  QUEtiapine (SEROQUEL) 25 mg tablet Take 12.5 mg by mouth daily.  carvedilol (COREG) 3.125 mg tablet Take 3.125 mg by mouth two (2) times daily (with meals).  diclofenac (VOLTAREN) 1 % gel Apply 2 g to affected area every six (6) hours as needed (arthritis pain).  acetaminophen (TYLENOL) 325 mg tablet Take 650 mg by mouth every eight (8) hours as needed for Pain.  Menthol (ASPERCREME HEAT) 10 % gel by Apply Externally route every eight (8) hours as needed (arthritis).  aspirin 81 mg chewable tablet Take 81 mg by mouth daily. Indications: myocardial infarction prevention    dilTIAZem XR (DILACOR XR) 120 mg XR capsule Take 120 mg by mouth daily. Indications: hypertension, VENTRICULAR RATE CONTROL IN ATRIAL FIBRILLATION    furosemide (LASIX) 20 mg tablet Take 20 mg by mouth daily as needed.  Indications: Edema, hypertension    loratadine (CLARITIN) 10 mg tablet Take 10 mg by mouth daily as needed. Indications: Allergic Rhinitis, SNEEZING    famotidine (PEPCID) 20 mg tablet Take 20 mg by mouth daily. Indications: Heartburn    QUEtiapine (SEROQUEL) 25 mg tablet Take 25 mg by mouth nightly. Indications: Generalized Anxiety Disorder      PAST MEDICAL HISTORY: Past medical history from the initial psychiatric evaluation has been reviewed (reviewed/updated 7/11/2018) with no additional updates (I asked patient and no additional past medical history provided). No past medical history on file. No past surgical history on file. SOCIAL HISTORY: Social history from the initial psychiatric evaluation has been reviewed (reviewed/updated 7/11/2018) with no additional updates (I asked patient and no additional social history provided). Social History     Social History    Marital status: SINGLE     Spouse name: N/A    Number of children: N/A    Years of education: N/A     Occupational History    Not on file. Social History Main Topics    Smoking status: Not on file    Smokeless tobacco: Not on file    Alcohol use Not on file    Drug use: Not on file    Sexual activity: Not on file     Other Topics Concern    Not on file     Social History Narrative      FAMILY HISTORY: Family history from the initial psychiatric evaluation has been reviewed (reviewed/updated 7/11/2018) with no additional updates (I asked patient and no additional family history provided). No family history on file.     REVIEW OF SYSTEMS: (reviewed/updated 7/11/2018)  Appetite:poor   Sleep: decreased more than normal   All other Review of Systems: Psychological ROS: positive for - behavioral disorder, concentration difficulties, disorientation and memory difficulties         MENTAL STATUS EXAM & VITALS     MENTAL STATUS EXAM (MSE):    MSE FINDINGS ARE WITHIN NORMAL LIMITS (WNL) UNLESS OTHERWISE STATED BELOW. ( ALL OF THE BELOW CATEGORIES OF THE MSE HAVE BEEN REVIEWED (reviewed 7/11/2018) AND UPDATED AS DEEMED APPROPRIATE )  General Presentation age appropriate and casually dressed, unreliable and vague   Orientation disorganized   Vital Signs  See below (reviewed 7/11/2018); Vital Signs (BP, Pulse, & Temp) are within normal limits if not listed below. Gait and Station Stable/steady, no ataxia   Musculoskeletal System No extrapyramidal symptoms (EPS); no abnormal muscular movements or Tardive Dyskinesia (TD); muscle strength and tone are within normal limits   Language No aphasia or dysarthria   Speech:  hypoverbal   Thought Processes illogical; slow rate of thoughts; poor abstract reasoning/computation   Thought Associations tangential   Thought Content free of delusions, free of hallucinations and preoccupations   Suicidal Ideations none   Homicidal Ideations none   Mood:  Less anxious   Affect:  anxious and increased in intensity   Memory recent  impaired   Memory remote:  impaired   Concentration/Attention:  distractable   Fund of Knowledge below average   Insight:  poor   Reliability poor   Judgment:  poor          VITALS:     Patient Vitals for the past 24 hrs:   Temp Pulse Resp BP   07/11/18 0652 97.2 °F (36.2 °C) (!) 58 16 135/50   07/10/18 1839 97.9 °F (36.6 °C) 61 16 125/57     Wt Readings from Last 3 Encounters:   06/18/18 54.6 kg (120 lb 4.8 oz)     Temp Readings from Last 3 Encounters:   07/11/18 97.2 °F (36.2 °C)   06/17/18 97.6 °F (36.4 °C)   06/15/18 97.6 °F (36.4 °C)     BP Readings from Last 3 Encounters:   07/11/18 135/50   06/17/18 102/47   06/16/18 99/55     Pulse Readings from Last 3 Encounters:   07/11/18 (!) 58   06/17/18 70   06/16/18 81            DATA     LABORATORY DATA:(reviewed/updated 7/11/2018)  No results found for this or any previous visit (from the past 24 hour(s)).   No results found for: VALF2, VALAC, VALP, VALPR, DS6, CRBAM, CRBAMP, CARB2, XCRBAM  No results found for: LITHM   RADIOLOGY REPORTS:(reviewed/updated 7/11/2018)  Xr Chest Port    Result Date: 6/17/2018  EXAM:  XR CHEST PORT INDICATION:  Chest Pain COMPARISON:  None. FINDINGS: A portable AP radiograph of the chest was obtained at 720 hours. The patient is on a cardiac monitor. There is minor right basilar atelectasis. There is question of a vague right perihilar airspace process. There is slight diffuse interstitial prominence. Heart size is borderline enlarged. Patient status post median sternotomy. IMPRESSION: 1. There is suggestion of a vague right perihilar airspace process and follow-up studies would be helpful to assess for interval change. There is minor right basilar atelectasis.           MEDICATIONS     ALL MEDICATIONS:   Current Facility-Administered Medications   Medication Dose Route Frequency    risperiDONE (RisperDAL) tablet 1 mg  1 mg Oral QHS    memantine (NAMENDA) tablet 10 mg  10 mg Oral BID    dilTIAZem ER (CARDIZEM LA) tablet 120 mg  120 mg Oral DAILY    ziprasidone (GEODON) 10 mg in sterile water (preservative free) 0.5 mL injection  10 mg IntraMUSCular BID PRN    benztropine (COGENTIN) tablet 1 mg  1 mg Oral BID PRN    benztropine (COGENTIN) injection 1 mg  1 mg IntraMUSCular BID PRN    acetaminophen (TYLENOL) tablet 650 mg  650 mg Oral Q4H PRN    magnesium hydroxide (MILK OF MAGNESIA) 400 mg/5 mL oral suspension 30 mL  30 mL Oral DAILY PRN    nicotine (NICODERM CQ) 21 mg/24 hr patch 1 Patch  1 Patch TransDERmal DAILY PRN    aspirin chewable tablet 81 mg  81 mg Oral DAILY    famotidine (PEPCID) tablet 20 mg  20 mg Oral DAILY    carvedilol (COREG) tablet 3.125 mg  3.125 mg Oral BID WITH MEALS      SCHEDULED MEDICATIONS:   Current Facility-Administered Medications   Medication Dose Route Frequency    risperiDONE (RisperDAL) tablet 1 mg  1 mg Oral QHS    memantine (NAMENDA) tablet 10 mg  10 mg Oral BID    dilTIAZem ER (CARDIZEM LA) tablet 120 mg  120 mg Oral DAILY    aspirin chewable tablet 81 mg  81 mg Oral DAILY    famotidine (PEPCID) tablet 20 mg  20 mg Oral DAILY    carvedilol (COREG) tablet 3.125 mg  3.125 mg Oral BID WITH MEALS          ASSESSMENT & PLAN     DIAGNOSES REQUIRING ACTIVE TREATMENT AND MONITORING: (reviewed/updated 7/11/2018)  Patient Active Hospital Problem List:   Dementia (6/17/2018)    Assessment: moderate to severe- disoriented, poor memory and irritable. Need total care. No behavior problem and accepting med    Plan:  S[facility]. Risperdal    Hypotension  A: stable. P:ct with current tx-IM to follow    7/11- placement- ct with current tx plan. Will dc prn Ambien for now    In summary, Niesha Talley, is a 66 y.o.  female who presents with a severe exacerbation of the principal diagnosis of Dementia  Patient's condition is /not stable . Patient requires continued inpatient hospitalization for further stabilization, safety monitoring and medication management. I will continue to coordinate the provision of individual, milieu, occupational, group, and substance abuse therapies to address target symptoms/diagnoses as deemed appropriate for the individual patient. A coordinated, multidisplinary treatment team round was conducted with the patient (this team consists of the nurse, psychiatric unit pharmcist,  and writer). Complete current electronic health record for patient has been reviewed today including consultant notes, ancillary staff notes, nurses and psychiatric tech notes. Suicide risk assessment completed and patient deemed to be of low risk for suicide at this time. The following regarding medications was addressed during rounds with patient:   the risks and benefits of the proposed medication. The patient was given the opportunity to ask questions. Informed consent given to the use of the above medications. Will continue to adjust psychiatric and non-psychiatric medications (see above \"medication\" section and orders section for details) as deemed appropriate & based upon diagnoses and response to treatment.      I will continue to order blood tests/labs and diagnostic tests as deemed appropriate and review results as they become available (see orders for details and above listed lab/test results). I will order psychiatric records from previous [facility] hospitals to further elucidate the nature of patient's psychopathology and review once available. I will gather additional collateral information from friends, family and o/p treatment team to further elucidate the nature of patient's psychopathology and baselline level of psychiatric functioning. I certify that this patient's inpatient psychiatric hospital services furnished since the previous certification were, and continue to be, required for treatment that could reasonably be expected to improve the patient's condition, or for diagnostic study, and that the patient continues to need, on a daily basis, active treatment furnished directly by or requiring the supervision of inpatient psychiatric facility personnel. In addition, the hospital records show that services furnished were intensive treatment services, admission or related services, or equivalent services.     EXPECTED DISCHARGE DATE/DAY: TBD     DISPOSITION: Home/FCI       Signed By:   Enrique Frey MD  7/11/2018

## 2018-07-11 NOTE — PROGRESS NOTES
Patient alert and oriented to person only. Visible in milieu with encouragement from staff but keeps to self; attends groups at times. Medication and meals compliant. Patient denies thoughts of harming self or others. Denies tactile, visual and auditory hallucinations. Calm, cooperative and confused with delusions at times. Will continue to monitor and assess.

## 2018-07-12 PROCEDURE — 74011250637 HC RX REV CODE- 250/637: Performed by: INTERNAL MEDICINE

## 2018-07-12 PROCEDURE — 74011250637 HC RX REV CODE- 250/637: Performed by: PSYCHIATRY & NEUROLOGY

## 2018-07-12 PROCEDURE — 65220000003 HC RM SEMIPRIVATE PSYCH

## 2018-07-12 RX ADMIN — ASPIRIN 81 MG 81 MG: 81 TABLET ORAL at 07:52

## 2018-07-12 RX ADMIN — CARVEDILOL 3.12 MG: 3.12 TABLET, FILM COATED ORAL at 07:51

## 2018-07-12 RX ADMIN — MEMANTINE 10 MG: 10 TABLET ORAL at 07:51

## 2018-07-12 RX ADMIN — FAMOTIDINE 20 MG: 20 TABLET ORAL at 07:52

## 2018-07-12 RX ADMIN — DILTIAZEM HYDROCHLORIDE 120 MG: 120 TABLET, EXTENDED RELEASE ORAL at 07:51

## 2018-07-12 RX ADMIN — MEMANTINE 10 MG: 10 TABLET ORAL at 17:28

## 2018-07-12 RX ADMIN — RISPERIDONE 1 MG: 1 TABLET ORAL at 21:58

## 2018-07-12 RX ADMIN — CARVEDILOL 3.12 MG: 3.12 TABLET, FILM COATED ORAL at 17:27

## 2018-07-12 NOTE — PROGRESS NOTES
Problem: Dementia-BEHAVIORAL HEALTH (Adult/Pediatric)  Goal: *STG: Remains safe in hospital  Outcome: Progressing Towards Goal  BEHAVIORAL HEALTH PROGRESS NOTE:  Pt alert and Person. Affect Calm and mood stable. denies SI/HI and denies A/V hallucinations. Appearance shows no evidence of impairment. compliant with all meds and meals. Judgment fair. has not participated in group therapy. Pt incontinent bowel and bladder on this shift. Pt cleaned and changed to new gowns. Pt's bed changed. Will continue to encourage pt to participate in groups. Will continue to monitor pt q15 minutes for safety and needs.

## 2018-07-12 NOTE — BH NOTES
GROUP THERAPY PROGRESS NOTE    The patient Stephy lew 66 y.o. female is participating in [facility] [employer]. Group time: 45 minutes    Personal goal for participation:  To participate in anger management [email or web address] game    Goal orientation:  personal    Group therapy participation: active    Therapeutic interventions reviewed and discussed: things pertaining to anger    Impression of participation:  The patient was attentive-required prompting and assistance    Fernando Cha  7/12/2018  1:40 PM

## 2018-07-12 NOTE — BH NOTES
PSYCHIATRIC PROGRESS NOTE         Patient Name  Nisreen Velasquez   Date of Birth 1940   [facility] [id number]   Medical Record Number  [id number]      Age  66 y.o. PCP Berenice Spears, MD   Admit date:  6/17/2018    Room Number  321/01  @ [facility]   Date of Service  7/12/2018           E & M PROGRESS NOTE:         HISTORY       CC:  \"confused, memory impairment\"  HISTORY OF PRESENT ILLNESS/INTERVAL HISTORY:  (reviewed/updated 7/12/2018). per initial evaluation: The patient, Nisreen Velasquez, is a 68 y.o. WHITE [place]  female with a past psychiatric history significant for dementia, who presents at this time with complaints of (and/or evidence of) the following emotional symptoms: memory impairment. Additional symptomatology include disoriented to time and place, poor memory and behavior issues. pt has been incontent and need assistance with ADLs. She is irritable and easily gets loud and labile in mood. She was dc to medical unit for tx of hypotension and now back on U for further tx  The above symptoms have been present for years. These symptoms are of severe severity. These symptoms are constant  in nature. Nisreen Velasquez presents/reports/evidences the following emotional symptoms today, 7/12/2018:memory impairment. The above symptoms have been present for years. These symptoms are of moderate severity. The symptoms are constant  in nature. Additional symptomatology and features include confused , disoriented to place and time. Pt is now accepting her med and need assistance with ADLs due to incontinence. No aggressive behavior and affect is bright , sleep is improving, pleasant and co operative. Received no prn.  6/25/18 she is compliant with medications and no anger issues like before and she gets confused and disoriented   6/26/18 she is doing better and mood is better and no anger issues and no SI or HI and slept well last night  6/27/18 she is the same and she is not engaging well and she has no anger issues and slept fairly last night but she was sleepy this morning and not engaging    6/28- remains confused with memory impairment. Pt has been hallucinating and paranoid- hiding behind her bed stating a women has been raped and stabbed. Received prn med. Sleep is better  6/29- episode of paranoid and mostly sun downing. Appear pleasant in the morning and no recollection of night time paranoia. unsteady gait due to sedation  6/30- pleasant and confused during the daytime, eating her breakfast. Still has paranoid behavior at night  7/1- slow progress as less sundowning and less paranoid. Pleasantly confused during the day time with no sig behavior problem  7/2- confused and disoriented with poor memory. Sun downing and worse in the evening with paranoia. 7/3- paranoid at night about a man coming to her room and killing. Fearful but pt is calm, pleasantly confused during daytime. Accepting med and no aggressive behavior  7/4- no sig behavior issue overnight and sleep is improved. Accepting med. Pleasant confused and with memory impairment  7/5- sleep is poor and remains paranoid at night seeing dead people. Need assistance with ADLs. No behavior issue  7/6- disturbed sleep last nigh when code s was called due to pt not responding- later cancelled as pt's vital were within normal and cxr was wnl to rule out aspiration. Pt is currently in bed responding to command and appear to be in no distress. Poor memory and attention  7/7-Pt was seen in her room with the RN. Presents alert, verbal, oriented to self only. Affect is flat. States she is feeling fine. Prn's used-Ambien 5 mg. No AVH or SI/HI. 7/8- Pt is incontinent of bowel and bladder, becomes irritable during ADL care. No significant changes from yesterday. Awaiting placement. 7/9- remains confused and disoriented to time and place. Pleasant and sleep is better. No behavior problem. Accepting med.    7/10- remains pleasant and confused with no behavior problem. Accepting med. Sleep is better. Poor memory and disoriented to time/place. 7/11- pleasant and co operative during the interview. She remains confused with poor memory. No delusional themes and sleep is improving.  7/12- pleasant and co operative. Remains disoriented, confused with poor memory. Slept well without prn med. No paranoid ideation      SIDE EFFECTS: (reviewed/updated 7/12/2018)  None reported or admitted to. No noted toxicity with use of current med   ALLERGIES:(reviewed/updated 7/12/2018)  Allergies   Allergen Reactions    Cephalosporins Itching    Codeine Hives    Erythromycin Hives    Nubain [Nalbuphine] Hives    Pcn [Penicillins] Hives    Percodan [Oxycodone Hcl-Oxycodone-Asa] Hives    Promethazine Hives    Protonix [Pantoprazole] Diarrhea    Sulfa (Sulfonamide Antibiotics) Hives    Toradol [Ketorolac] Hives    Ultram [Tramadol] Hives    Valium [Diazepam] Hives    Prilosec [Omeprazole] Itching      MEDICATIONS PRIOR TO ADMISSION:(reviewed/updated 7/12/2018)  Prescriptions Prior to Admission   Medication Sig    multivit-minerals/folic acid (WOMEN'S MULTIVITAMIN GUMMIES PO) Take 1 Tab by mouth daily.  QUEtiapine (SEROQUEL) 25 mg tablet Take 12.5 mg by mouth daily.  carvedilol (COREG) 3.125 mg tablet Take 3.125 mg by mouth two (2) times daily (with meals).  diclofenac (VOLTAREN) 1 % gel Apply 2 g to affected area every six (6) hours as needed (arthritis pain).  acetaminophen (TYLENOL) 325 mg tablet Take 650 mg by mouth every eight (8) hours as needed for Pain.  Menthol (ASPERCREME HEAT) 10 % gel by Apply Externally route every eight (8) hours as needed (arthritis).  aspirin 81 mg chewable tablet Take 81 mg by mouth daily. Indications: myocardial infarction prevention    dilTIAZem XR (DILACOR XR) 120 mg XR capsule Take 120 mg by mouth daily.  Indications: hypertension, VENTRICULAR RATE CONTROL IN ATRIAL FIBRILLATION    furosemide (LASIX) 20 mg tablet Take 20 mg by mouth daily as needed. Indications: Edema, hypertension    loratadine (CLARITIN) 10 mg tablet Take 10 mg by mouth daily as needed. Indications: Allergic Rhinitis, SNEEZING    famotidine (PEPCID) 20 mg tablet Take 20 mg by mouth daily. Indications: Heartburn    QUEtiapine (SEROQUEL) 25 mg tablet Take 25 mg by mouth nightly. Indications: Generalized Anxiety Disorder      PAST MEDICAL HISTORY: Past medical history from the initial psychiatric evaluation has been reviewed (reviewed/updated 7/12/2018) with no additional updates (I asked patient and no additional past medical history provided). No past medical history on file. No past surgical history on file. SOCIAL HISTORY: Social history from the initial psychiatric evaluation has been reviewed (reviewed/updated 7/12/2018) with no additional updates (I asked patient and no additional social history provided). Social History     Social History    Marital status: SINGLE     Spouse name: N/A    Number of children: N/A    Years of education: N/A     Occupational History    Not on file. Social History Main Topics    Smoking status: Not on file    Smokeless tobacco: Not on file    Alcohol use Not on file    Drug use: Not on file    Sexual activity: Not on file     Other Topics Concern    Not on file     Social History Narrative      FAMILY HISTORY: Family history from the initial psychiatric evaluation has been reviewed (reviewed/updated 7/12/2018) with no additional updates (I asked patient and no additional family history provided). No family history on file.     REVIEW OF SYSTEMS: (reviewed/updated 7/12/2018)  Appetite:poor   Sleep: decreased more than normal   All other Review of Systems: Psychological ROS: positive for - behavioral disorder, concentration difficulties, disorientation and memory difficulties         [facility] [place]    [facility] FINDINGS ARE WITHIN NORMAL LIMITS (WNL) UNLESS OTHERWISE STATED BELOW. ( ALL OF THE BELOW CATEGORIES OF THE MSE HAVE BEEN REVIEWED (reviewed 7/12/2018) AND UPDATED AS DEEMED APPROPRIATE )  General Presentation age appropriate and casually dressed, unreliable and vague   Orientation disorganized   Vital Signs  See below (reviewed 7/12/2018); Vital Signs (BP, Pulse, & Temp) are within normal limits if not listed below. Gait and Station Stable/steady, no ataxia   Musculoskeletal System No extrapyramidal symptoms (EPS); no abnormal muscular movements or Tardive Dyskinesia (TD); muscle strength and tone are within normal limits   Language No aphasia or dysarthria   Speech:  hypoverbal   Thought Processes illogical; slow rate of thoughts; poor abstract reasoning/computation   Thought Associations tangential   Thought Content free of delusions, free of hallucinations and preoccupations   Suicidal Ideations none   Homicidal Ideations none   Mood:  Less anxious   Affect:  anxious and increased in intensity   Memory recent  impaired   Memory remote:  impaired   Concentration/Attention:  distractable   Fund of Knowledge below average   Insight:  poor   Reliability poor   Judgment:  poor          VITALS:     Patient Vitals for the past 24 hrs:   Temp Pulse Resp BP   07/12/18 0645 98.3 °F (36.8 °C) 65 16 144/62   07/11/18 1723 - 65 - 135/51     Wt Readings from Last 3 Encounters:   06/18/18 54.6 kg (120 lb 4.8 oz)     Temp Readings from Last 3 Encounters:   07/12/18 98.3 °F (36.8 °C)   06/17/18 97.6 °F (36.4 °C)   06/15/18 97.6 °F (36.4 °C)     BP Readings from Last 3 Encounters:   07/12/18 144/62   06/17/18 102/47   06/16/18 99/55     Pulse Readings from Last 3 Encounters:   07/12/18 65   06/17/18 70   06/16/18 81            DATA     LABORATORY DATA:(reviewed/updated 7/12/2018)  No results found for this or any previous visit (from the past 24 hour(s)).   No results found for: VALF2, VALAC, VALP, VALPR, DS6, CRBAM, CRBAMP, CARB2, XCRBAM  No results found for: [address] [place] [address] [address] [place] REPORTS:(reviewed/updated 7/12/2018)  Xr Chest Port    Result Date: 6/17/2018  EXAM:  XR CHEST PORT INDICATION:  Chest Pain COMPARISON:  None. FINDINGS: A portable AP radiograph of the chest was obtained at 720 hours. The patient is on a cardiac monitor. There is minor right basilar atelectasis. There is question of a vague right perihilar airspace process. There is slight diffuse interstitial prominence. Heart size is borderline enlarged. Patient status post median sternotomy. IMPRESSION: 1. There is suggestion of a vague right perihilar airspace process and follow-up studies would be helpful to assess for interval change. There is minor right basilar atelectasis.           MEDICATIONS     ALL MEDICATIONS:   Current Facility-Administered Medications   Medication Dose Route Frequency    risperiDONE (RisperDAL) tablet 1 mg  1 mg Oral QHS    memantine (NAMENDA) tablet 10 mg  10 mg Oral BID    dilTIAZem ER (CARDIZEM LA) tablet 120 mg  120 mg Oral DAILY    ziprasidone (GEODON) 10 mg in sterile water (preservative free) 0.5 mL injection  10 mg IntraMUSCular BID PRN    benztropine (COGENTIN) tablet 1 mg  1 mg Oral BID PRN    benztropine (COGENTIN) injection 1 mg  1 mg IntraMUSCular BID PRN    acetaminophen (TYLENOL) tablet 650 mg  650 mg Oral Q4H PRN    magnesium hydroxide (MILK OF MAGNESIA) 400 mg/5 mL oral suspension 30 mL  30 mL Oral DAILY PRN    nicotine (NICODERM CQ) 21 mg/24 hr patch 1 Patch  1 Patch TransDERmal DAILY PRN    aspirin chewable tablet 81 mg  81 mg Oral DAILY    famotidine (PEPCID) tablet 20 mg  20 mg Oral DAILY    carvedilol (COREG) tablet 3.125 mg  3.125 mg Oral BID WITH MEALS      SCHEDULED MEDICATIONS:   Current Facility-Administered Medications   Medication Dose Route Frequency    risperiDONE (RisperDAL) tablet 1 mg  1 mg Oral QHS    memantine (NAMENDA) tablet 10 mg  10 mg Oral BID    dilTIAZem ER (CARDIZEM LA) tablet 120 mg  120 mg Oral DAILY    aspirin chewable tablet 81 mg 81 mg Oral DAILY    famotidine (PEPCID) tablet 20 mg  20 mg Oral DAILY    carvedilol (COREG) tablet 3.125 mg  3.125 mg Oral BID WITH MEALS          ASSESSMENT & PLAN     DIAGNOSES REQUIRING ACTIVE TREATMENT AND MONITORING: (reviewed/updated 7/12/2018)  Patient Active Hospital Problem List:   Dementia (6/17/2018)    Assessment: moderate to severe- disoriented, poor memory and irritable. Need total care. No behavior problem and accepting med    Plan:  Clenton Jhon. Risperdal    Hypotension  A: stable. P:ct with current tx-IM to follow    7/12- placement- ct with current tx plan. Refused by [facility]    In summary, Mohsen Lopez, is a 66 y.o.  female who presents with a severe exacerbation of the principal diagnosis of Dementia  Patient's condition is /not stable . Patient requires continued inpatient hospitalization for further stabilization, safety monitoring and medication management. I will continue to coordinate the provision of individual, milieu, occupational, group, and substance abuse therapies to address target symptoms/diagnoses as deemed appropriate for the individual patient. A coordinated, multidisplinary treatment team round was conducted with the patient (this team consists of the nurse, psychiatric unit pharmcist,  and writer). Complete current electronic health record for patient has been reviewed today including consultant notes, ancillary staff notes, nurses and psychiatric tech notes. Suicide risk assessment completed and patient deemed to be of low risk for suicide at this time. The following regarding medications was addressed during rounds with patient:   the risks and benefits of the proposed medication. The patient was given the opportunity to ask questions. Informed consent given to the use of the above medications.  Will continue to adjust psychiatric and non-psychiatric medications (see above \"medication\" section and orders section for details) as deemed appropriate & based upon diagnoses and response to treatment. I will continue to order blood tests/labs and diagnostic tests as deemed appropriate and review results as they become available (see orders for details and above listed lab/test results). I will order psychiatric records from previous [facility] hospitals to further elucidate the nature of patient's psychopathology and review once available. I will gather additional collateral information from friends, family and o/p treatment team to further elucidate the nature of patient's psychopathology and baselline level of psychiatric functioning. I certify that this patient's inpatient psychiatric hospital services furnished since the previous certification were, and continue to be, required for treatment that could reasonably be expected to improve the patient's condition, or for diagnostic study, and that the patient continues to need, on a daily basis, active treatment furnished directly by or requiring the supervision of inpatient psychiatric facility personnel. In addition, the hospital records show that services furnished were intensive treatment services, admission or related services, or equivalent services.     EXPECTED DISCHARGE DATE/DAY: TBD     DISPOSITION: Home/[facility]       Signed By:   Parvin Alford MD  7/12/2018

## 2018-07-12 NOTE — BH NOTES
Pt observed resting comfortably in bed with eyes closed, breathing even and unlabored. No s/s of distress noted, no complaints voiced. Pt slept 5.5 hours, will continue Q 15 minute monitoring for safety.

## 2018-07-12 NOTE — PROGRESS NOTES
GROUP THERAPY PROGRESS NOTE      Stephanie Monsalve was not present for medication group.       [address] [facility] [id number]

## 2018-07-13 PROCEDURE — 74011250637 HC RX REV CODE- 250/637: Performed by: PSYCHIATRY & NEUROLOGY

## 2018-07-13 PROCEDURE — 74011250637 HC RX REV CODE- 250/637: Performed by: INTERNAL MEDICINE

## 2018-07-13 PROCEDURE — 65220000003 HC RM SEMIPRIVATE PSYCH

## 2018-07-13 RX ADMIN — FAMOTIDINE 20 MG: 20 TABLET ORAL at 08:31

## 2018-07-13 RX ADMIN — CARVEDILOL 3.12 MG: 3.12 TABLET, FILM COATED ORAL at 08:31

## 2018-07-13 RX ADMIN — MEMANTINE 10 MG: 10 TABLET ORAL at 17:29

## 2018-07-13 RX ADMIN — DILTIAZEM HYDROCHLORIDE 120 MG: 120 TABLET, EXTENDED RELEASE ORAL at 08:32

## 2018-07-13 RX ADMIN — MEMANTINE 10 MG: 10 TABLET ORAL at 08:31

## 2018-07-13 RX ADMIN — ASPIRIN 81 MG 81 MG: 81 TABLET ORAL at 08:31

## 2018-07-13 RX ADMIN — CARVEDILOL 3.12 MG: 3.12 TABLET, FILM COATED ORAL at 17:29

## 2018-07-13 RX ADMIN — RISPERIDONE 1 MG: 1 TABLET ORAL at 21:35

## 2018-07-13 NOTE — PROGRESS NOTES
Problem: Dementia-BEHAVIORAL HEALTH (Adult/Pediatric)  Goal: *STG: Remains safe in hospital  Outcome: Progressing Towards Goal  Patient confused and isolative to room. Visible on the unit for some meals and medications but encouragement is needed. Non-participatory in groups/activities. Encouraged to stay awake during shift. Incontinence care provided. Positive for auditory and visual hallucinations. Responding to internal stimuli but in NAD. Denies pain/discomfort. Continues on q15 min safety checks. Will continue to monitor and continue plan of care.

## 2018-07-13 NOTE — PROGRESS NOTES
Problem: Dementia-BEHAVIORAL HEALTH (Adult/Pediatric)  Goal: *STG: Remains safe in hospital  Outcome: Progressing Towards Goal  Pt rested quietly in bed with eyes closed. No signs/symptoms of distress, agitation, or anxiety. Will monitor for changes. Q 15 minute checks continue.  Pt slept 7 hrs

## 2018-07-13 NOTE — BH NOTES
PSYCHIATRIC PROGRESS NOTE         Patient Name  Niesha Talley   Date of Birth 1940   [facility] [id number]   Medical Record Number  [id number]      Age  66 y.o. PCP Berenice Spears, MD   Admit date:  6/17/2018    Room Number  321/01  @ [facility]   Date of Service  7/13/2018           E & M PROGRESS NOTE:         HISTORY       CC:  \"confused, memory impairment\"  HISTORY OF PRESENT ILLNESS/INTERVAL HISTORY:  (reviewed/updated 7/13/2018). per initial evaluation: The patient, Niesha Talley, is a 68 y.o. WHITE [place]  female with a past psychiatric history significant for dementia, who presents at this time with complaints of (and/or evidence of) the following emotional symptoms: memory impairment. Additional symptomatology include disoriented to time and place, poor memory and behavior issues. pt has been incontent and need assistance with ADLs. She is irritable and easily gets loud and labile in mood. She was dc to medical unit for tx of hypotension and now back on U for further tx  The above symptoms have been present for years. These symptoms are of severe severity. These symptoms are constant  in nature. Niesha Talley presents/reports/evidences the following emotional symptoms today, 7/13/2018:memory impairment. The above symptoms have been present for years. These symptoms are of moderate severity. The symptoms are constant  in nature. Additional symptomatology and features include confused , disoriented to place and time. Pt is now accepting her med and need assistance with ADLs due to incontinence. No aggressive behavior and affect is bright , sleep is improving, pleasant and co operative. Received no prn.  6/25/18 she is compliant with medications and no anger issues like before and she gets confused and disoriented   6/26/18 she is doing better and mood is better and no anger issues and no SI or HI and slept well last night  6/27/18 she is the same and she is not engaging well and she has no anger issues and slept fairly last night but she was sleepy this morning and not engaging    6/28- remains confused with memory impairment. Pt has been hallucinating and paranoid- hiding behind her bed stating a women has been raped and stabbed. Received prn med. Sleep is better  6/29- episode of paranoid and mostly sun downing. Appear pleasant in the morning and no recollection of night time paranoia. unsteady gait due to sedation  6/30- pleasant and confused during the daytime, eating her breakfast. Still has paranoid behavior at night  7/1- slow progress as less sundowning and less paranoid. Pleasantly confused during the day time with no sig behavior problem  7/2- confused and disoriented with poor memory. Sun downing and worse in the evening with paranoia. 7/3- paranoid at night about a man coming to her room and killing. Fearful but pt is calm, pleasantly confused during daytime. Accepting med and no aggressive behavior  7/4- no sig behavior issue overnight and sleep is improved. Accepting med. Pleasant confused and with memory impairment  7/5- sleep is poor and remains paranoid at night seeing dead people. Need assistance with ADLs. No behavior issue  7/6- disturbed sleep last nigh when code s was called due to pt not responding- later cancelled as pt's vital were within normal and cxr was wnl to rule out aspiration. Pt is currently in bed responding to command and appear to be in no distress. Poor memory and attention  7/7-Pt was seen in her room with the RN. Presents alert, verbal, oriented to self only. Affect is flat. States she is feeling fine. Prn's used-Ambien 5 mg. No AVH or SI/HI. 7/8- Pt is incontinent of bowel and bladder, becomes irritable during ADL care. No significant changes from yesterday. Awaiting placement. 7/9- remains confused and disoriented to time and place. Pleasant and sleep is better. No behavior problem. Accepting med.    7/10- remains pleasant and confused with no behavior problem. Accepting med. Sleep is better. Poor memory and disoriented to time/place. 7/11- pleasant and co operative during the interview. She remains confused with poor memory. No delusional themes and sleep is improving.  7/12- pleasant and co operative. Remains disoriented, confused with poor memory. Slept well without prn med. No paranoid ideation  7/13- pleasant and confused. No behavior issue. Likes to color during group activities. Accepting med. Sleep is better      SIDE EFFECTS: (reviewed/updated 7/13/2018)  None reported or admitted to. No noted toxicity with use of current med   ALLERGIES:(reviewed/updated 7/13/2018)  Allergies   Allergen Reactions    Cephalosporins Itching    Codeine Hives    Erythromycin Hives    Nubain [Nalbuphine] Hives    Pcn [Penicillins] Hives    Percodan [Oxycodone Hcl-Oxycodone-Asa] Hives    Promethazine Hives    Protonix [Pantoprazole] Diarrhea    Sulfa (Sulfonamide Antibiotics) Hives    Toradol [Ketorolac] Hives    Ultram [Tramadol] Hives    Valium [Diazepam] Hives    Prilosec [Omeprazole] Itching      MEDICATIONS PRIOR TO ADMISSION:(reviewed/updated 7/13/2018)  Prescriptions Prior to Admission   Medication Sig    multivit-minerals/folic acid (WOMEN'S MULTIVITAMIN GUMMIES PO) Take 1 Tab by mouth daily.  QUEtiapine (SEROQUEL) 25 mg tablet Take 12.5 mg by mouth daily.  carvedilol (COREG) 3.125 mg tablet Take 3.125 mg by mouth two (2) times daily (with meals).  diclofenac (VOLTAREN) 1 % gel Apply 2 g to affected area every six (6) hours as needed (arthritis pain).  acetaminophen (TYLENOL) 325 mg tablet Take 650 mg by mouth every eight (8) hours as needed for Pain.  Menthol (ASPERCREME HEAT) 10 % gel by Apply Externally route every eight (8) hours as needed (arthritis).  aspirin 81 mg chewable tablet Take 81 mg by mouth daily.  Indications: myocardial infarction prevention    dilTIAZem XR (DILACOR XR) 120 mg XR capsule Take 120 mg by mouth daily. Indications: hypertension, VENTRICULAR RATE CONTROL IN ATRIAL FIBRILLATION    furosemide (LASIX) 20 mg tablet Take 20 mg by mouth daily as needed. Indications: Edema, hypertension    loratadine (CLARITIN) 10 mg tablet Take 10 mg by mouth daily as needed. Indications: Allergic Rhinitis, SNEEZING    famotidine (PEPCID) 20 mg tablet Take 20 mg by mouth daily. Indications: Heartburn    QUEtiapine (SEROQUEL) 25 mg tablet Take 25 mg by mouth nightly. Indications: Generalized Anxiety Disorder      PAST MEDICAL HISTORY: Past medical history from the initial psychiatric evaluation has been reviewed (reviewed/updated 7/13/2018) with no additional updates (I asked patient and no additional past medical history provided). No past medical history on file. No past surgical history on file. SOCIAL HISTORY: Social history from the initial psychiatric evaluation has been reviewed (reviewed/updated 7/13/2018) with no additional updates (I asked patient and no additional social history provided). Social History     Social History    Marital status: SINGLE     Spouse name: N/A    Number of children: N/A    Years of education: N/A     Occupational History    Not on file. Social History Main Topics    Smoking status: Not on file    Smokeless tobacco: Not on file    Alcohol use Not on file    Drug use: Not on file    Sexual activity: Not on file     Other Topics Concern    Not on file     Social History Narrative      FAMILY HISTORY: Family history from the initial psychiatric evaluation has been reviewed (reviewed/updated 7/13/2018) with no additional updates (I asked patient and no additional family history provided). No family history on file.     REVIEW OF SYSTEMS: (reviewed/updated 7/13/2018)  Appetite:poor   Sleep: decreased more than normal   All other Review of Systems: Psychological ROS: positive for - behavioral disorder, concentration difficulties, disorientation and memory difficulties         MENTAL STATUS EXAM & VITALS     MENTAL STATUS EXAM (MSE):    MSE FINDINGS ARE WITHIN NORMAL LIMITS (WNL) UNLESS OTHERWISE STATED BELOW. ( ALL OF THE BELOW CATEGORIES OF THE MSE HAVE BEEN REVIEWED (reviewed 7/13/2018) AND UPDATED AS DEEMED APPROPRIATE )  General Presentation age appropriate and casually dressed, unreliable and vague   Orientation disorganized   Vital Signs  See below (reviewed 7/13/2018); Vital Signs (BP, Pulse, & Temp) are within normal limits if not listed below.    Gait and Station Stable/steady, no ataxia   Musculoskeletal System No extrapyramidal symptoms (EPS); no abnormal muscular movements or Tardive Dyskinesia (TD); muscle strength and tone are within normal limits   Language No aphasia or dysarthria   Speech:  hypoverbal   Thought Processes illogical; slow rate of thoughts; poor abstract reasoning/computation   Thought Associations tangential   Thought Content free of delusions, free of hallucinations and preoccupations   Suicidal Ideations none   Homicidal Ideations none   Mood:  Less anxious   Affect:  anxious and increased in intensity   Memory recent  impaired   Memory remote:  impaired   Concentration/Attention:  distractable   Fund of Knowledge below average   Insight:  poor   Reliability poor   Judgment:  poor          VITALS:     Patient Vitals for the past 24 hrs:   Temp Pulse Resp BP SpO2   07/13/18 0831 - 74 - 121/61 -   07/13/18 0622 98.6 °F (37 °C) 67 16 140/63 -   07/12/18 1727 - (!) 55 16 122/46 98 %     Wt Readings from Last 3 Encounters:   06/18/18 54.6 kg (120 lb 4.8 oz)     Temp Readings from Last 3 Encounters:   07/13/18 98.6 °F (37 °C)   06/17/18 97.6 °F (36.4 °C)   06/15/18 97.6 °F (36.4 °C)     BP Readings from Last 3 Encounters:   07/13/18 121/61   06/17/18 102/47   06/16/18 99/55     Pulse Readings from Last 3 Encounters:   07/13/18 74   06/17/18 70   06/16/18 81            DATA     LABORATORY DATA:(reviewed/updated 7/13/2018)  No results found for this or any previous visit (from the past 24 hour(s)). No results found for: VALF2, VALAC, VALP, VALPR, DS6, CRBAM, CRBAMP, CARB2, XCRBAM  No results found for: LITHM   RADIOLOGY REPORTS:(reviewed/updated 7/13/2018)  Xr Chest Port    Result Date: 6/17/2018  EXAM:  XR CHEST PORT INDICATION:  Chest Pain COMPARISON:  None. FINDINGS: A portable AP radiograph of the chest was obtained at 720 hours. The patient is on a cardiac monitor. There is minor right basilar atelectasis. There is question of a vague right perihilar airspace process. There is slight diffuse interstitial prominence. Heart size is borderline enlarged. Patient status post median sternotomy. IMPRESSION: 1. There is suggestion of a vague right perihilar airspace process and follow-up studies would be helpful to assess for interval change. There is minor right basilar atelectasis.           MEDICATIONS     ALL MEDICATIONS:   Current Facility-Administered Medications   Medication Dose Route Frequency    risperiDONE (RisperDAL) tablet 1 mg  1 mg Oral QHS    memantine (NAMENDA) tablet 10 mg  10 mg Oral BID    dilTIAZem ER (CARDIZEM LA) tablet 120 mg  120 mg Oral DAILY    ziprasidone (GEODON) 10 mg in sterile water (preservative free) 0.5 mL injection  10 mg IntraMUSCular BID PRN    benztropine (COGENTIN) tablet 1 mg  1 mg Oral BID PRN    benztropine (COGENTIN) injection 1 mg  1 mg IntraMUSCular BID PRN    acetaminophen (TYLENOL) tablet 650 mg  650 mg Oral Q4H PRN    magnesium hydroxide (MILK OF MAGNESIA) 400 mg/5 mL oral suspension 30 mL  30 mL Oral DAILY PRN    nicotine (NICODERM CQ) 21 mg/24 hr patch 1 Patch  1 Patch TransDERmal DAILY PRN    aspirin chewable tablet 81 mg  81 mg Oral DAILY    famotidine (PEPCID) tablet 20 mg  20 mg Oral DAILY    carvedilol (COREG) tablet 3.125 mg  3.125 mg Oral BID WITH MEALS      SCHEDULED MEDICATIONS:   Current Facility-Administered Medications   Medication Dose Route Frequency    risperiDONE (RisperDAL) tablet 1 mg  1 mg Oral QHS    memantine (NAMENDA) tablet 10 mg  10 mg Oral BID    dilTIAZem ER (CARDIZEM LA) tablet 120 mg  120 mg Oral DAILY    aspirin chewable tablet 81 mg  81 mg Oral DAILY    famotidine (PEPCID) tablet 20 mg  20 mg Oral DAILY    carvedilol (COREG) tablet 3.125 mg  3.125 mg Oral BID WITH MEALS          ASSESSMENT & PLAN     DIAGNOSES REQUIRING ACTIVE TREATMENT AND MONITORING: (reviewed/updated 7/13/2018)  Patient Active Hospital Problem List:   Dementia (6/17/2018)    Assessment: moderate to severe- disoriented, poor memory and irritable. Need total care. No behavior problem and accepting med    Plan:  Ld Maier. Risperdal    Hypotension  A: stable. P:ct with current tx-IM to follow    7/13- waiting for placement- ct with current tx plan. Refused by group home    In summary, Fernando Jackson, is a 66 y.o.  female who presents with a severe exacerbation of the principal diagnosis of Dementia  Patient's condition is /not stable . Patient requires continued inpatient hospitalization for further stabilization, safety monitoring and medication management. I will continue to coordinate the provision of individual, milieu, occupational, group, and substance abuse therapies to address target symptoms/diagnoses as deemed appropriate for the individual patient. A coordinated, multidisplinary treatment team round was conducted with the patient (this team consists of the nurse, psychiatric unit pharmcist,  and writer). Complete current electronic health record for patient has been reviewed today including consultant notes, ancillary staff notes, nurses and psychiatric tech notes. Suicide risk assessment completed and patient deemed to be of low risk for suicide at this time. The following regarding medications was addressed during rounds with patient:   the risks and benefits of the proposed medication. The patient was given the opportunity to ask questions.  Informed consent given to the use of the above medications. Will continue to adjust psychiatric and non-psychiatric medications (see above \"medication\" section and orders section for details) as deemed appropriate & based upon diagnoses and response to treatment. I will continue to order blood tests/labs and diagnostic tests as deemed appropriate and review results as they become available (see orders for details and above listed lab/test results). I will order psychiatric records from previous [facility] to further elucidate the nature of patient's psychopathology and review once available. I will gather additional collateral information from friends, family and o/p treatment team to further elucidate the nature of patient's psychopathology and baselline level of psychiatric functioning. I certify that this patient's inpatient psychiatric hospital services furnished since the previous certification were, and continue to be, required for treatment that could reasonably be expected to improve the patient's condition, or for diagnostic study, and that the patient continues to need, on a daily basis, active treatment furnished directly by or requiring the supervision of inpatient psychiatric facility personnel. In addition, the hospital records show that services furnished were intensive treatment services, admission or related services, or equivalent services.     EXPECTED DISCHARGE DATE/DAY: TBD     DISPOSITION: Home/long term       Signed By:   Yoni Thakur MD  7/13/2018

## 2018-07-13 NOTE — BH NOTES
Problem: Altered Thought Process (Adult/Pediatric)  Goal: *STG: Remains safe in hospital  Outcome: Progressing Towards Goal  Pt is visible in the milieu. Pt is alert but only oriented x 1 to herself. Pt presents as quiet and pleasant. Pt is meds/meals compliant. Pt's appetite is good. Pt is able to sit in the dayroom all afternoon. Pt remains confused and disoriented due to her dementia.  Will continue to monitor q 15 for safety, mood and behavior changes.

## 2018-07-13 NOTE — BH NOTES
GROUP THERAPY PROGRESS NOTE    The patient Jordan Signs a 66 y.o. female is participating in [employer]. Group time: 1 hour    Personal goal for participation: To concentrate on selected task    Goal orientation: social    Group therapy participation: active    Therapeutic interventions reviewed and discussed: Crafts, games, music    Impression of participation: The patient was attentive.     Chas Wolff  7/13/2018  5:33 PM

## 2018-07-13 NOTE — BH NOTES
Problem: Altered Thought Process (Adult/Pediatric)  Goal: *STG: Remains safe in hospital  Outcome: Progressing Towards Goal  Pt is visible in the milieu. Pt is alert but only oriented x 1 to herself. Pt presents as quiet and pleasant. Pt is meds/meals compliant. Pt is able to sit in the dayroom all morning. Pt was present in her treatment team meeting today. Pt remains confused and disoriented due to her dementia. Will continue to monitor q 15 for safety, mood and behavior changes.

## 2018-07-13 NOTE — BH NOTES
GROUP THERAPY PROGRESS NOTE    The patient Ata lew 66 y.o. female is participating in [address] [place] [address]. Group time: 45 minutes    Personal goal for participation: To participate in relaxation activity    Goal orientation:  relaxation    Group therapy participation: active    Therapeutic interventions reviewed and discussed: favorite ways to relax    Impression of participation:  The patient was attentive.     Rhonda Lamar  7/13/2018  1:25 PM

## 2018-07-14 PROCEDURE — 74011250637 HC RX REV CODE- 250/637: Performed by: INTERNAL MEDICINE

## 2018-07-14 PROCEDURE — 65220000003 HC RM SEMIPRIVATE PSYCH

## 2018-07-14 PROCEDURE — 74011250637 HC RX REV CODE- 250/637: Performed by: PSYCHIATRY & NEUROLOGY

## 2018-07-14 RX ORDER — RISPERIDONE 1 MG/ML
1 SOLUTION ORAL
Status: DISCONTINUED | OUTPATIENT
Start: 2018-07-14 | End: 2018-07-30 | Stop reason: HOSPADM

## 2018-07-14 RX ADMIN — ASPIRIN 81 MG 81 MG: 81 TABLET ORAL at 08:34

## 2018-07-14 RX ADMIN — MEMANTINE 10 MG: 10 TABLET ORAL at 08:34

## 2018-07-14 RX ADMIN — MEMANTINE 10 MG: 10 TABLET ORAL at 16:35

## 2018-07-14 RX ADMIN — CARVEDILOL 3.12 MG: 3.12 TABLET, FILM COATED ORAL at 16:35

## 2018-07-14 RX ADMIN — CARVEDILOL 3.12 MG: 3.12 TABLET, FILM COATED ORAL at 08:34

## 2018-07-14 RX ADMIN — FAMOTIDINE 20 MG: 20 TABLET ORAL at 08:34

## 2018-07-14 RX ADMIN — Medication 1 MG: at 21:23

## 2018-07-14 RX ADMIN — DILTIAZEM HYDROCHLORIDE 120 MG: 120 TABLET, EXTENDED RELEASE ORAL at 08:34

## 2018-07-14 NOTE — BH NOTES
Problem: Altered Thought Process (Adult/Pediatric)  Goal: *STG: Remains safe in hospital  Outcome: Progressing Towards Goal  Pt is visible in the milieu. Pt is alert but only oriented x 1 to herself. Pt presents as quiet and pleasant. Pt is meds/meals compliant. Pt's appetite is good. Pt is able to sit in the dayroom all afternoon. Pt remains confused and disoriented due to her dementia. Pt has been assisted with her ADL. Will continue to monitor q 15 for safety, mood and behavior changes.

## 2018-07-14 NOTE — BH NOTES
Pt was escorted to the bathroom. Her pull up was dry. Patient voided a large amount of urine and had a small BM.

## 2018-07-14 NOTE — BH NOTES
PSYCHIATRIC PROGRESS NOTE         Patient Name  Sid Mcfadden   Date of Birth 1940   [facility] [id number]   Medical Record Number  [id number]      Age  66 y.o. PCP Berenice Spears, MD   Admit date:  6/17/2018    Room Number  321/01  @ [facility]   Date of Service  7/14/2018           E & M PROGRESS NOTE:         HISTORY       CC:  \"confused, memory impairment\"  HISTORY OF PRESENT ILLNESS/INTERVAL HISTORY:  (reviewed/updated 7/14/2018). per initial evaluation: The patient, Sid Mcfadden, is a 68 y.o. WHITE [place]  female with a past psychiatric history significant for dementia, who presents at this time with complaints of (and/or evidence of) the following emotional symptoms: memory impairment. Additional symptomatology include disoriented to time and place, poor memory and behavior issues. pt has been incontent and need assistance with ADLs. She is irritable and easily gets loud and labile in mood. She was dc to medical unit for tx of hypotension and now back on U for further tx  The above symptoms have been present for years. These symptoms are of severe severity. These symptoms are constant  in nature. Sid Mcfadden presents/reports/evidences the following emotional symptoms today, 7/14/2018:memory impairment. The above symptoms have been present for years. These symptoms are of moderate severity. The symptoms are constant  in nature. Additional symptomatology and features include confused , disoriented to place and time. Pt is now accepting her med and need assistance with ADLs due to incontinence. No aggressive behavior and affect is bright , sleep is improving, pleasant and co operative. Received no prn.  6/25/18 she is compliant with medications and no anger issues like before and she gets confused and disoriented   6/26/18 she is doing better and mood is better and no anger issues and no SI or HI and slept well last night  6/27/18 she is the same and she is not engaging well and she has no anger issues and slept fairly last night but she was sleepy this morning and not engaging    6/28- remains confused with memory impairment. Pt has been hallucinating and paranoid- hiding behind her bed stating a women has been raped and stabbed. Received prn med. Sleep is better  6/29- episode of paranoid and mostly sun downing. Appear pleasant in the morning and no recollection of night time paranoia. unsteady gait due to sedation  6/30- pleasant and confused during the daytime, eating her breakfast. Still has paranoid behavior at night  7/1- slow progress as less sundowning and less paranoid. Pleasantly confused during the day time with no sig behavior problem  7/2- confused and disoriented with poor memory. Sun downing and worse in the evening with paranoia. 7/3- paranoid at night about a man coming to her room and killing. Fearful but pt is calm, pleasantly confused during daytime. Accepting med and no aggressive behavior  7/4- no sig behavior issue overnight and sleep is improved. Accepting med. Pleasant confused and with memory impairment  7/5- sleep is poor and remains paranoid at night seeing dead people. Need assistance with ADLs. No behavior issue  7/6- disturbed sleep last nigh when code s was called due to pt not responding- later cancelled as pt's vital were within normal and cxr was wnl to rule out aspiration. Pt is currently in bed responding to command and appear to be in no distress. Poor memory and attention  7/7-Pt was seen in her room with the RN. Presents alert, verbal, oriented to self only. Affect is flat. States she is feeling fine. Prn's used-Ambien 5 mg. No AVH or SI/HI. 7/8- Pt is incontinent of bowel and bladder, becomes irritable during ADL care. No significant changes from yesterday. Awaiting placement. 7/9- remains confused and disoriented to time and place. Pleasant and sleep is better. No behavior problem. Accepting med.    7/10- remains pleasant and confused with no behavior problem. Accepting med. Sleep is better. Poor memory and disoriented to time/place. 7/11- pleasant and co operative during the interview. She remains confused with poor memory. No delusional themes and sleep is improving.  7/12- pleasant and co operative. Remains disoriented, confused with poor memory. Slept well without prn med. No paranoid ideation  7/13- pleasant and confused. No behavior issue. Likes to color during group activities. Accepting med. Sleep is better  7/14- no behavior problem overnight. pleasant and with memory impairment. Need total care, bowel and bladder incontinent      SIDE EFFECTS: (reviewed/updated 7/14/2018)  None reported or admitted to. No noted toxicity with use of current med   ALLERGIES:(reviewed/updated 7/14/2018)  Allergies   Allergen Reactions    Cephalosporins Itching    Codeine Hives    Erythromycin Hives    Nubain [Nalbuphine] Hives    Pcn [Penicillins] Hives    Percodan [Oxycodone Hcl-Oxycodone-Asa] Hives    Promethazine Hives    Protonix [Pantoprazole] Diarrhea    Sulfa (Sulfonamide Antibiotics) Hives    Toradol [Ketorolac] Hives    Ultram [Tramadol] Hives    Valium [Diazepam] Hives    Prilosec [Omeprazole] Itching      MEDICATIONS PRIOR TO ADMISSION:(reviewed/updated 7/14/2018)  Prescriptions Prior to Admission   Medication Sig    multivit-minerals/folic acid (WOMEN'S MULTIVITAMIN GUMMIES PO) Take 1 Tab by mouth daily.  QUEtiapine (SEROQUEL) 25 mg tablet Take 12.5 mg by mouth daily.  carvedilol (COREG) 3.125 mg tablet Take 3.125 mg by mouth two (2) times daily (with meals).  diclofenac (VOLTAREN) 1 % gel Apply 2 g to affected area every six (6) hours as needed (arthritis pain).  acetaminophen (TYLENOL) 325 mg tablet Take 650 mg by mouth every eight (8) hours as needed for Pain.  Menthol (ASPERCREME HEAT) 10 % gel by Apply Externally route every eight (8) hours as needed (arthritis).     aspirin 81 mg chewable tablet Take 81 mg by mouth daily. Indications: myocardial infarction prevention    dilTIAZem XR (DILACOR XR) 120 mg XR capsule Take 120 mg by mouth daily. Indications: hypertension, VENTRICULAR RATE CONTROL IN ATRIAL FIBRILLATION    furosemide (LASIX) 20 mg tablet Take 20 mg by mouth daily as needed. Indications: Edema, hypertension    loratadine (CLARITIN) 10 mg tablet Take 10 mg by mouth daily as needed. Indications: Allergic Rhinitis, SNEEZING    famotidine (PEPCID) 20 mg tablet Take 20 mg by mouth daily. Indications: Heartburn    QUEtiapine (SEROQUEL) 25 mg tablet Take 25 mg by mouth nightly. Indications: Generalized Anxiety Disorder      PAST MEDICAL HISTORY: Past medical history from the initial psychiatric evaluation has been reviewed (reviewed/updated 7/14/2018) with no additional updates (I asked patient and no additional past medical history provided). No past medical history on file. No past surgical history on file. SOCIAL HISTORY: Social history from the initial psychiatric evaluation has been reviewed (reviewed/updated 7/14/2018) with no additional updates (I asked patient and no additional social history provided). Social History     Social History    Marital status: SINGLE     Spouse name: N/A    Number of children: N/A    Years of education: N/A     Occupational History    Not on file. Social History Main Topics    Smoking status: Not on file    Smokeless tobacco: Not on file    Alcohol use Not on file    Drug use: Not on file    Sexual activity: Not on file     Other Topics Concern    Not on file     Social History Narrative      FAMILY HISTORY: Family history from the initial psychiatric evaluation has been reviewed (reviewed/updated 7/14/2018) with no additional updates (I asked patient and no additional family history provided). No family history on file.     REVIEW OF SYSTEMS: (reviewed/updated 7/14/2018)  Appetite:poor   Sleep: decreased more than normal   All other Review of Systems: Psychological ROS: positive for - behavioral disorder, concentration difficulties, disorientation and memory difficulties         [facility] (MSE):    MSE FINDINGS ARE WITHIN NORMAL LIMITS (WNL) UNLESS OTHERWISE STATED BELOW. ( ALL OF THE BELOW CATEGORIES OF THE MSE HAVE BEEN REVIEWED (reviewed 7/14/2018) AND UPDATED AS DEEMED APPROPRIATE )  General Presentation age appropriate and casually dressed, unreliable and vague   Orientation disorganized   Vital Signs  See below (reviewed 7/14/2018); Vital Signs (BP, Pulse, & Temp) are within normal limits if not listed below.    Gait and Station Stable/steady, no ataxia   Musculoskeletal System No extrapyramidal symptoms (EPS); no abnormal muscular movements or Tardive Dyskinesia (TD); muscle strength and tone are within normal limits   Language No aphasia or dysarthria   Speech:  hypoverbal   Thought Processes illogical; slow rate of thoughts; poor abstract reasoning/computation   Thought Associations tangential   Thought Content free of delusions, free of hallucinations and preoccupations   Suicidal Ideations none   Homicidal Ideations none   Mood:  Less anxious   Affect:  anxious and increased in intensity   Memory recent  impaired   Memory remote:  impaired   Concentration/Attention:  distractable   Fund of Knowledge below average   Insight:  poor   Reliability poor   Judgment:  poor          VITALS:     Patient Vitals for the past 24 hrs:   Temp Pulse Resp BP   07/14/18 1635 - (!) 57 - 122/55   07/14/18 0642 97 °F (36.1 °C) (!) 56 16 131/63     Wt Readings from Last 3 Encounters:   06/18/18 54.6 kg (120 lb 4.8 oz)     Temp Readings from Last 3 Encounters:   07/14/18 97 °F (36.1 °C)   06/17/18 97.6 °F (36.4 °C)   06/15/18 97.6 °F (36.4 °C)     BP Readings from Last 3 Encounters:   07/14/18 122/55   06/17/18 102/47   06/16/18 99/55     Pulse Readings from Last 3 Encounters:   07/14/18 (!) 57   06/17/18 70   06/16/18 81            DATA LABORATORY DATA:(reviewed/updated 7/14/2018)  No results found for this or any previous visit (from the past 24 hour(s)). No results found for: VALF2, VALAC, VALP, VALPR, DS6, CRBAM, CRBAMP, CARB2, XCRBAM  No results found for: LITHM   RADIOLOGY REPORTS:(reviewed/updated 7/14/2018)  Xr Chest Port    Result Date: 6/17/2018  EXAM:  XR CHEST PORT INDICATION:  Chest Pain COMPARISON:  None. FINDINGS: A portable AP radiograph of the chest was obtained at 720 hours. The patient is on a cardiac monitor. There is minor right basilar atelectasis. There is question of a vague right perihilar airspace process. There is slight diffuse interstitial prominence. Heart size is borderline enlarged. Patient status post median sternotomy. IMPRESSION: 1. There is suggestion of a vague right perihilar airspace process and follow-up studies would be helpful to assess for interval change. There is minor right basilar atelectasis.           MEDICATIONS     ALL MEDICATIONS:   Current Facility-Administered Medications   Medication Dose Route Frequency    risperiDONE (RisperDAL) 1 mg/mL oral solution soln 1 mg  1 mg Oral QHS    memantine (NAMENDA) tablet 10 mg  10 mg Oral BID    dilTIAZem ER (CARDIZEM LA) tablet 120 mg  120 mg Oral DAILY    ziprasidone (GEODON) 10 mg in sterile water (preservative free) 0.5 mL injection  10 mg IntraMUSCular BID PRN    benztropine (COGENTIN) tablet 1 mg  1 mg Oral BID PRN    benztropine (COGENTIN) injection 1 mg  1 mg IntraMUSCular BID PRN    acetaminophen (TYLENOL) tablet 650 mg  650 mg Oral Q4H PRN    magnesium hydroxide (MILK OF MAGNESIA) 400 mg/5 mL oral suspension 30 mL  30 mL Oral DAILY PRN    nicotine (NICODERM CQ) 21 mg/24 hr patch 1 Patch  1 Patch TransDERmal DAILY PRN    aspirin chewable tablet 81 mg  81 mg Oral DAILY    famotidine (PEPCID) tablet 20 mg  20 mg Oral DAILY    carvedilol (COREG) tablet 3.125 mg  3.125 mg Oral BID WITH MEALS      SCHEDULED MEDICATIONS:   Current Facility-Administered Medications   Medication Dose Route Frequency    risperiDONE (RisperDAL) 1 mg/mL oral solution soln 1 mg  1 mg Oral QHS    memantine (NAMENDA) tablet 10 mg  10 mg Oral BID    dilTIAZem ER (CARDIZEM LA) tablet 120 mg  120 mg Oral DAILY    aspirin chewable tablet 81 mg  81 mg Oral DAILY    famotidine (PEPCID) tablet 20 mg  20 mg Oral DAILY    carvedilol (COREG) tablet 3.125 mg  3.125 mg Oral BID WITH MEALS          ASSESSMENT & PLAN     DIAGNOSES REQUIRING ACTIVE TREATMENT AND MONITORING: (reviewed/updated 7/14/2018)  Patient Active Hospital Problem List:   Dementia (6/17/2018)    Assessment: moderate to severe- disoriented, poor memory and irritable. Need total care. No behavior problem and accepting med    Plan:  [place] [facility]. Risperdal    Hypotension  A: stable. P:ct with current tx-IM to follow    7/14- waiting for placement- ct with current tx plan- change tp liq Risperdal as pt having difficulty swallowing pills. Refused by [facility]    In summary, Shon Benitez, is a 66 y.o.  female who presents with a severe exacerbation of the principal diagnosis of Dementia  Patient's condition is /not stable . Patient requires continued inpatient hospitalization for further stabilization, safety monitoring and medication management. I will continue to coordinate the provision of individual, milieu, occupational, group, and substance abuse therapies to address target symptoms/diagnoses as deemed appropriate for the individual patient. A coordinated, multidisplinary treatment team round was conducted with the patient (this team consists of the nurse, psychiatric unit pharmcist,  and writer). Complete current electronic health record for patient has been reviewed today including consultant notes, ancillary staff notes, nurses and psychiatric tech notes. Suicide risk assessment completed and patient deemed to be of low risk for suicide at this time.      The following regarding medications was addressed during rounds with patient:   the risks and benefits of the proposed medication. The patient was given the opportunity to ask questions. Informed consent given to the use of the above medications. Will continue to adjust psychiatric and non-psychiatric medications (see above \"medication\" section and orders section for details) as deemed appropriate & based upon diagnoses and response to treatment. I will continue to order blood tests/labs and diagnostic tests as deemed appropriate and review results as they become available (see orders for details and above listed lab/test results). I will order psychiatric records from previous [facility] hospitals to further elucidate the nature of patient's psychopathology and review once available. I will gather additional collateral information from friends, family and o/p treatment team to further elucidate the nature of patient's psychopathology and baselline level of psychiatric functioning. I certify that this patient's inpatient psychiatric hospital services furnished since the previous certification were, and continue to be, required for treatment that could reasonably be expected to improve the patient's condition, or for diagnostic study, and that the patient continues to need, on a daily basis, active treatment furnished directly by or requiring the supervision of inpatient psychiatric facility personnel. In addition, the hospital records show that services furnished were intensive treatment services, admission or related services, or equivalent services.     EXPECTED DISCHARGE DATE/DAY: TBD     DISPOSITION: Home/[facility]       Signed By:   Warren Cat MD  7/14/2018

## 2018-07-14 NOTE — PROGRESS NOTES
Problem: Dementia-BEHAVIORAL HEALTH (Adult/Pediatric)  Goal: *STG: Remains safe in hospital  Outcome: Progressing Towards Goal  Pt rested quietly in bed with eyes closed. No signs/symptoms of distress, agitation, or anxiety. Will monitor for changes. Q 15 minute checks continue.  Pt slept 7hrs

## 2018-07-15 PROCEDURE — 74011250637 HC RX REV CODE- 250/637: Performed by: INTERNAL MEDICINE

## 2018-07-15 PROCEDURE — 65220000003 HC RM SEMIPRIVATE PSYCH

## 2018-07-15 PROCEDURE — 74011250637 HC RX REV CODE- 250/637: Performed by: PSYCHIATRY & NEUROLOGY

## 2018-07-15 RX ADMIN — MEMANTINE 10 MG: 10 TABLET ORAL at 08:49

## 2018-07-15 RX ADMIN — Medication 1 MG: at 21:31

## 2018-07-15 RX ADMIN — ASPIRIN 81 MG 81 MG: 81 TABLET ORAL at 08:49

## 2018-07-15 RX ADMIN — DILTIAZEM HYDROCHLORIDE 120 MG: 120 TABLET, EXTENDED RELEASE ORAL at 08:49

## 2018-07-15 RX ADMIN — MEMANTINE 10 MG: 10 TABLET ORAL at 17:07

## 2018-07-15 RX ADMIN — CARVEDILOL 3.12 MG: 3.12 TABLET, FILM COATED ORAL at 17:06

## 2018-07-15 RX ADMIN — CARVEDILOL 3.12 MG: 3.12 TABLET, FILM COATED ORAL at 08:49

## 2018-07-15 RX ADMIN — FAMOTIDINE 20 MG: 20 TABLET ORAL at 08:49

## 2018-07-15 NOTE — BH NOTES
Pt rested quietly in bed with eyes closed. No signs/symptoms of distress. Will monitor for changes. Q 15 minute checks continue. 2 hour chart audit done.  Slept   6  hours since 2200

## 2018-07-15 NOTE — PROGRESS NOTES
Problem: Dementia-BEHAVIORAL HEALTH (Adult/Pediatric)  Goal: *STG: Remains safe in hospital  Outcome: Progressing Towards Goal  Pt confused but calm and cooperative this shift. Pt visible in the dayroom engaging in arts and crafts. Pt took all medications as prescribed. Pt awake in her room. No med/beh concerns. Staff will continue to monitor for health and safety.

## 2018-07-15 NOTE — BH NOTES
Problem: Altered Thought Process (Adult/Pediatric)  Goal: *STG: Remains safe in hospital  Outcome: Progressing Towards Goal  Pt is visible in the milieu. Pt is alert but only oriented x 1 to herself some times. Pt presents as quiet and pleasant. Pt is meds/meals compliant. Pt's appetite is good. Pt is able to sit in the dayroom all morning. Pt remains confused and disoriented due to her dementia. Pt has been assisted with her ADL. Will continue to monitor q 15 for safety, mood and behavior changes.

## 2018-07-15 NOTE — BH NOTES
PSYCHIATRIC PROGRESS NOTE         Patient Name  Maria Isabel Stevenson   Date of Birth 1940   [facility] [id number]   Medical Record Number  [id number]      Age  66 y.o. PCP Berenice Spears, MD   Admit date:  6/17/2018    Room Number  321/01  @ [facility]   Date of Service  7/15/2018           E & M PROGRESS NOTE:         HISTORY       CC:  \"confused, memory impairment\"  HISTORY OF PRESENT ILLNESS/INTERVAL HISTORY:  (reviewed/updated 7/15/2018). per initial evaluation: The patient, Maria Isabel Stevenson, is a 68 y.o. WHITE [place]  female with a past psychiatric history significant for dementia, who presents at this time with complaints of (and/or evidence of) the following emotional symptoms: memory impairment. Additional symptomatology include disoriented to time and place, poor memory and behavior issues. pt has been incontent and need assistance with ADLs. She is irritable and easily gets loud and labile in mood. She was dc to medical unit for tx of hypotension and now back on U for further tx  The above symptoms have been present for years. These symptoms are of severe severity. These symptoms are constant  in nature. Maria Isabel Stevenson presents/reports/evidences the following emotional symptoms today, 7/15/2018:memory impairment. The above symptoms have been present for years. These symptoms are of moderate severity. The symptoms are constant  in nature. Additional symptomatology and features include confused , disoriented to place and time. Pt is now accepting her med and need assistance with ADLs due to incontinence. No aggressive behavior and affect is bright , sleep is improving, pleasant and co operative. Received no prn.  6/25/18 she is compliant with medications and no anger issues like before and she gets confused and disoriented   6/26/18 she is doing better and mood is better and no anger issues and no SI or HI and slept well last night  6/27/18 she is the same and she is not engaging well and she has no anger issues and slept fairly last night but she was sleepy this morning and not engaging    6/28- remains confused with memory impairment. Pt has been hallucinating and paranoid- hiding behind her bed stating a women has been raped and stabbed. Received prn med. Sleep is better  6/29- episode of paranoid and mostly sun downing. Appear pleasant in the morning and no recollection of night time paranoia. unsteady gait due to sedation  6/30- pleasant and confused during the daytime, eating her breakfast. Still has paranoid behavior at night  7/1- slow progress as less sundowning and less paranoid. Pleasantly confused during the day time with no sig behavior problem  7/2- confused and disoriented with poor memory. Sun downing and worse in the evening with paranoia. 7/3- paranoid at night about a man coming to her room and killing. Fearful but pt is calm, pleasantly confused during daytime. Accepting med and no aggressive behavior  7/4- no sig behavior issue overnight and sleep is improved. Accepting med. Pleasant confused and with memory impairment  7/5- sleep is poor and remains paranoid at night seeing dead people. Need assistance with ADLs. No behavior issue  7/6- disturbed sleep last nigh when code s was called due to pt not responding- later cancelled as pt's vital were within normal and cxr was wnl to rule out aspiration. Pt is currently in bed responding to command and appear to be in no distress. Poor memory and attention  7/7-Pt was seen in her room with the RN. Presents alert, verbal, oriented to self only. Affect is flat. States she is feeling fine. Prn's used-Ambien 5 mg. No AVH or SI/HI. 7/8- Pt is incontinent of bowel and bladder, becomes irritable during ADL care. No significant changes from yesterday. Awaiting placement. 7/9- remains confused and disoriented to time and place. Pleasant and sleep is better. No behavior problem. Accepting med.    7/10- remains pleasant and confused with no behavior problem. Accepting med. Sleep is better. Poor memory and disoriented to time/place. 7/11- pleasant and co operative during the interview. She remains confused with poor memory. No delusional themes and sleep is improving.  7/12- pleasant and co operative. Remains disoriented, confused with poor memory. Slept well without prn med. No paranoid ideation  7/13- pleasant and confused. No behavior issue. Likes to color during group activities. Accepting med. Sleep is better  7/14- no behavior problem overnight. pleasant and with memory impairment. Need total care, bowel and bladder incontinent  7/15- no sig change in presentation, no behavior problem. Ready for placement. Memory impairment      SIDE EFFECTS: (reviewed/updated 7/15/2018)  None reported or admitted to. No noted toxicity with use of current med   ALLERGIES:(reviewed/updated 7/15/2018)  Allergies   Allergen Reactions    Cephalosporins Itching    Codeine Hives    Erythromycin Hives    Nubain [Nalbuphine] Hives    Pcn [Penicillins] Hives    Percodan [Oxycodone Hcl-Oxycodone-Asa] Hives    Promethazine Hives    Protonix [Pantoprazole] Diarrhea    Sulfa (Sulfonamide Antibiotics) Hives    Toradol [Ketorolac] Hives    Ultram [Tramadol] Hives    Valium [Diazepam] Hives    Prilosec [Omeprazole] Itching      MEDICATIONS PRIOR TO ADMISSION:(reviewed/updated 7/15/2018)  Prescriptions Prior to Admission   Medication Sig    multivit-minerals/folic acid (WOMEN'S MULTIVITAMIN GUMMIES PO) Take 1 Tab by mouth daily.  QUEtiapine (SEROQUEL) 25 mg tablet Take 12.5 mg by mouth daily.  carvedilol (COREG) 3.125 mg tablet Take 3.125 mg by mouth two (2) times daily (with meals).  diclofenac (VOLTAREN) 1 % gel Apply 2 g to affected area every six (6) hours as needed (arthritis pain).  acetaminophen (TYLENOL) 325 mg tablet Take 650 mg by mouth every eight (8) hours as needed for Pain.     Menthol (ASPERCREME HEAT) 10 % gel by Apply Externally route every eight (8) hours as needed (arthritis).  aspirin 81 mg chewable tablet Take 81 mg by mouth daily. Indications: myocardial infarction prevention    dilTIAZem XR (DILACOR XR) 120 mg XR capsule Take 120 mg by mouth daily. Indications: hypertension, VENTRICULAR RATE CONTROL IN ATRIAL FIBRILLATION    furosemide (LASIX) 20 mg tablet Take 20 mg by mouth daily as needed. Indications: Edema, hypertension    loratadine (CLARITIN) 10 mg tablet Take 10 mg by mouth daily as needed. Indications: Allergic Rhinitis, SNEEZING    famotidine (PEPCID) 20 mg tablet Take 20 mg by mouth daily. Indications: Heartburn    QUEtiapine (SEROQUEL) 25 mg tablet Take 25 mg by mouth nightly. Indications: Generalized Anxiety Disorder      PAST MEDICAL HISTORY: Past medical history from the initial psychiatric evaluation has been reviewed (reviewed/updated 7/15/2018) with no additional updates (I asked patient and no additional past medical history provided). No past medical history on file. No past surgical history on file. SOCIAL HISTORY: Social history from the initial psychiatric evaluation has been reviewed (reviewed/updated 7/15/2018) with no additional updates (I asked patient and no additional social history provided). Social History     Social History    Marital status: SINGLE     Spouse name: N/A    Number of children: N/A    Years of education: N/A     Occupational History    Not on file. Social History Main Topics    Smoking status: Not on file    Smokeless tobacco: Not on file    Alcohol use Not on file    Drug use: Not on file    Sexual activity: Not on file     Other Topics Concern    Not on file     Social History Narrative      FAMILY HISTORY: Family history from the initial psychiatric evaluation has been reviewed (reviewed/updated 7/15/2018) with no additional updates (I asked patient and no additional family history provided). No family history on file.     REVIEW OF SYSTEMS: (reviewed/updated 7/15/2018)  Appetite:poor   Sleep: decreased more than normal   All other Review of Systems: Psychological ROS: positive for - behavioral disorder, concentration difficulties, disorientation and memory difficulties         [facility] (MSE):    MSE FINDINGS ARE WITHIN NORMAL LIMITS (WNL) UNLESS OTHERWISE STATED BELOW. ( ALL OF THE BELOW CATEGORIES OF THE MSE HAVE BEEN REVIEWED (reviewed 7/15/2018) AND UPDATED AS DEEMED APPROPRIATE )  General Presentation age appropriate and casually dressed, unreliable and vague   Orientation disorganized   Vital Signs  See below (reviewed 7/15/2018); Vital Signs (BP, Pulse, & Temp) are within normal limits if not listed below.    Gait and Station Stable/steady, no ataxia   Musculoskeletal System No extrapyramidal symptoms (EPS); no abnormal muscular movements or Tardive Dyskinesia (TD); muscle strength and tone are within normal limits   Language No aphasia or dysarthria   Speech:  hypoverbal   Thought Processes illogical; slow rate of thoughts; poor abstract reasoning/computation   Thought Associations tangential   Thought Content free of delusions, free of hallucinations and preoccupations   Suicidal Ideations none   Homicidal Ideations none   Mood:  Less anxious   Affect:  anxious and increased in intensity   Memory recent  impaired   Memory remote:  impaired   Concentration/Attention:  distractable   Fund of Knowledge below average   Insight:  poor   Reliability poor   Judgment:  poor          VITALS:     Patient Vitals for the past 24 hrs:   Pulse BP   07/15/18 1706 65 134/55   07/15/18 0849 (!) 59 126/57     Wt Readings from Last 3 Encounters:   06/18/18 54.6 kg (120 lb 4.8 oz)     Temp Readings from Last 3 Encounters:   07/14/18 97 °F (36.1 °C)   06/17/18 97.6 °F (36.4 °C)   06/15/18 97.6 °F (36.4 °C)     BP Readings from Last 3 Encounters:   07/15/18 134/55   06/17/18 102/47   06/16/18 99/55     Pulse Readings from Last 3 Encounters: 07/15/18 65   06/17/18 70   06/16/18 81            DATA     LABORATORY DATA:(reviewed/updated 7/15/2018)  No results found for this or any previous visit (from the past 24 hour(s)). No results found for: VALF2, VALAC, VALP, VALPR, DS6, CRBAM, CRBAMP, CARB2, XCRBAM  No results found for: LITHM   RADIOLOGY REPORTS:(reviewed/updated 7/15/2018)  Xr Chest Port    Result Date: 6/17/2018  EXAM:  XR CHEST PORT INDICATION:  Chest Pain COMPARISON:  None. FINDINGS: A portable AP radiograph of the chest was obtained at 720 hours. The patient is on a cardiac monitor. There is minor right basilar atelectasis. There is question of a vague right perihilar airspace process. There is slight diffuse interstitial prominence. Heart size is borderline enlarged. Patient status post median sternotomy. IMPRESSION: 1. There is suggestion of a vague right perihilar airspace process and follow-up studies would be helpful to assess for interval change. There is minor right basilar atelectasis.           MEDICATIONS     ALL MEDICATIONS:   Current Facility-Administered Medications   Medication Dose Route Frequency    risperiDONE (RisperDAL) 1 mg/mL oral solution soln 1 mg  1 mg Oral QHS    memantine (NAMENDA) tablet 10 mg  10 mg Oral BID    dilTIAZem ER (CARDIZEM LA) tablet 120 mg  120 mg Oral DAILY    ziprasidone (GEODON) 10 mg in sterile water (preservative free) 0.5 mL injection  10 mg IntraMUSCular BID PRN    benztropine (COGENTIN) tablet 1 mg  1 mg Oral BID PRN    benztropine (COGENTIN) injection 1 mg  1 mg IntraMUSCular BID PRN    acetaminophen (TYLENOL) tablet 650 mg  650 mg Oral Q4H PRN    magnesium hydroxide (MILK OF MAGNESIA) 400 mg/5 mL oral suspension 30 mL  30 mL Oral DAILY PRN    nicotine (NICODERM CQ) 21 mg/24 hr patch 1 Patch  1 Patch TransDERmal DAILY PRN    aspirin chewable tablet 81 mg  81 mg Oral DAILY    famotidine (PEPCID) tablet 20 mg  20 mg Oral DAILY    carvedilol (COREG) tablet 3.125 mg  3.125 mg Oral BID WITH MEALS      SCHEDULED MEDICATIONS:   Current Facility-Administered Medications   Medication Dose Route Frequency    risperiDONE (RisperDAL) 1 mg/mL oral solution soln 1 mg  1 mg Oral QHS    memantine (NAMENDA) tablet 10 mg  10 mg Oral BID    dilTIAZem ER (CARDIZEM LA) tablet 120 mg  120 mg Oral DAILY    aspirin chewable tablet 81 mg  81 mg Oral DAILY    famotidine (PEPCID) tablet 20 mg  20 mg Oral DAILY    carvedilol (COREG) tablet 3.125 mg  3.125 mg Oral BID WITH MEALS          ASSESSMENT & PLAN     DIAGNOSES REQUIRING ACTIVE TREATMENT AND MONITORING: (reviewed/updated 7/15/2018)  Patient Active Hospital Problem List:   Dementia (6/17/2018)    Assessment: moderate to severe- disoriented, poor memory and irritable. Need total care. No behavior problem and accepting med    Plan:  Ed Nash Risperdal    Hypotension  A: stable. P:ct with current tx-IM to follow    7/15- no change- waiting for placement- ct with current tx plan-  liq Risperdal  Refused by halfway    In summary, Una Go, is a 66 y.o.  female who presents with a severe exacerbation of the principal diagnosis of Dementia  Patient's condition is /not stable . Patient requires continued inpatient hospitalization for further stabilization, safety monitoring and medication management. I will continue to coordinate the provision of individual, milieu, occupational, group, and substance abuse therapies to address target symptoms/diagnoses as deemed appropriate for the individual patient. A coordinated, multidisplinary treatment team round was conducted with the patient (this team consists of the nurse, psychiatric unit pharmcist,  and writer). Complete current electronic health record for patient has been reviewed today including consultant notes, ancillary staff notes, nurses and psychiatric tech notes. Suicide risk assessment completed and patient deemed to be of low risk for suicide at this time.      The following regarding medications was addressed during rounds with patient:   the risks and benefits of the proposed medication. The patient was given the opportunity to ask questions. Informed consent given to the use of the above medications. Will continue to adjust psychiatric and non-psychiatric medications (see above \"medication\" section and orders section for details) as deemed appropriate & based upon diagnoses and response to treatment. I will continue to order blood tests/labs and diagnostic tests as deemed appropriate and review results as they become available (see orders for details and above listed lab/test results). I will order psychiatric records from previous [facility] hospitals to further elucidate the nature of patient's psychopathology and review once available. I will gather additional collateral information from friends, family and o/p treatment team to further elucidate the nature of patient's psychopathology and baselline level of psychiatric functioning. I certify that this patient's inpatient psychiatric hospital services furnished since the previous certification were, and continue to be, required for treatment that could reasonably be expected to improve the patient's condition, or for diagnostic study, and that the patient continues to need, on a daily basis, active treatment furnished directly by or requiring the supervision of inpatient psychiatric facility personnel. In addition, the hospital records show that services furnished were intensive treatment services, admission or related services, or equivalent services.     EXPECTED DISCHARGE DATE/DAY: TBD     DISPOSITION: Home/halfway       Signed By:   Darshana Lazcano MD  7/15/2018

## 2018-07-16 PROCEDURE — 74011250637 HC RX REV CODE- 250/637: Performed by: INTERNAL MEDICINE

## 2018-07-16 PROCEDURE — 65220000003 HC RM SEMIPRIVATE PSYCH

## 2018-07-16 PROCEDURE — 74011250637 HC RX REV CODE- 250/637: Performed by: PSYCHIATRY & NEUROLOGY

## 2018-07-16 RX ADMIN — Medication 1 MG: at 21:25

## 2018-07-16 RX ADMIN — ASPIRIN 81 MG 81 MG: 81 TABLET ORAL at 09:57

## 2018-07-16 RX ADMIN — MEMANTINE 10 MG: 10 TABLET ORAL at 16:51

## 2018-07-16 RX ADMIN — MEMANTINE 10 MG: 10 TABLET ORAL at 09:57

## 2018-07-16 RX ADMIN — CARVEDILOL 3.12 MG: 3.12 TABLET, FILM COATED ORAL at 16:51

## 2018-07-16 RX ADMIN — ACETAMINOPHEN 650 MG: 325 TABLET, FILM COATED ORAL at 21:25

## 2018-07-16 RX ADMIN — FAMOTIDINE 20 MG: 20 TABLET ORAL at 09:57

## 2018-07-16 NOTE — BH NOTES
REFLECTIONS GROUP THERAPY PROGRESS NOTE    The patient Lety Zuniga is participating in Reflections Group Therapy. Group time: 30 minutes    Personal goal for participation: Share with group about feelings and concerns throughout the course of the day. Goal orientation: personal    Group therapy participation: active    Therapeutic interventions reviewed and discussed: Yes    Impression of participation:   Positive input.     Carmen Santiago  7/15/2018

## 2018-07-16 NOTE — BH NOTES
7-11 Pt visible in milieu, sits in dayroom quietly and interacts appropriately with peers. Pt has been med and meal compliant, less isolative. Will continue to monitor pt Q 15 minutes for safety.

## 2018-07-16 NOTE — BH NOTES
PSYCHIATRIC PROGRESS NOTE         Patient Name  Maria Isabel Stevenson   Date of Birth 1940   [facility] [id number]   Medical Record Number  [id number]      Age  66 y.o. PCP Berenice Spears, MD   Admit date:  6/17/2018    Room Number  321/01  @ [facility]   Date of Service  7/16/2018           E & M PROGRESS NOTE:         HISTORY       CC:  \"confused, memory impairment\"  HISTORY OF PRESENT ILLNESS/INTERVAL HISTORY:  (reviewed/updated 7/16/2018). per initial evaluation: The patient, Maria Isabel Stevenson, is a 68 y.o. WHITE [place]  female with a past psychiatric history significant for dementia, who presents at this time with complaints of (and/or evidence of) the following emotional symptoms: memory impairment. Additional symptomatology include disoriented to time and place, poor memory and behavior issues. pt has been incontent and need assistance with ADLs. She is irritable and easily gets loud and labile in mood. She was dc to medical unit for tx of hypotension and now back on U for further tx  The above symptoms have been present for years. These symptoms are of severe severity. These symptoms are constant  in nature. Maria Isabel Stevenson presents/reports/evidences the following emotional symptoms today, 7/16/2018:memory impairment. The above symptoms have been present for years. These symptoms are of moderate severity. The symptoms are constant  in nature. Additional symptomatology and features include confused , disoriented to place and time. Pt is now accepting her med and need assistance with ADLs due to incontinence. No aggressive behavior and affect is bright , sleep is improving, pleasant and co operative. Received no prn.  6/25/18 she is compliant with medications and no anger issues like before and she gets confused and disoriented   6/26/18 she is doing better and mood is better and no anger issues and no SI or HI and slept well last night  6/27/18 she is the same and she is not engaging well and she has no anger issues and slept fairly last night but she was sleepy this morning and not engaging    6/28- remains confused with memory impairment. Pt has been hallucinating and paranoid- hiding behind her bed stating a women has been raped and stabbed. Received prn med. Sleep is better  6/29- episode of paranoid and mostly sun downing. Appear pleasant in the morning and no recollection of night time paranoia. unsteady gait due to sedation  6/30- pleasant and confused during the daytime, eating her breakfast. Still has paranoid behavior at night  7/1- slow progress as less sundowning and less paranoid. Pleasantly confused during the day time with no sig behavior problem  7/2- confused and disoriented with poor memory. Sun downing and worse in the evening with paranoia. 7/3- paranoid at night about a man coming to her room and killing. Fearful but pt is calm, pleasantly confused during daytime. Accepting med and no aggressive behavior  7/4- no sig behavior issue overnight and sleep is improved. Accepting med. Pleasant confused and with memory impairment  7/5- sleep is poor and remains paranoid at night seeing dead people. Need assistance with ADLs. No behavior issue  7/6- disturbed sleep last nigh when code s was called due to pt not responding- later cancelled as pt's vital were within normal and cxr was wnl to rule out aspiration. Pt is currently in bed responding to command and appear to be in no distress. Poor memory and attention  7/7-Pt was seen in her room with the RN. Presents alert, verbal, oriented to self only. Affect is flat. States she is feeling fine. Prn's used-Ambien 5 mg. No AVH or SI/HI. 7/8- Pt is incontinent of bowel and bladder, becomes irritable during ADL care. No significant changes from yesterday. Awaiting placement. 7/9- remains confused and disoriented to time and place. Pleasant and sleep is better. No behavior problem. Accepting med.    7/10- remains pleasant and confused with no behavior problem. Accepting med. Sleep is better. Poor memory and disoriented to time/place. 7/11- pleasant and co operative during the interview. She remains confused with poor memory. No delusional themes and sleep is improving.  7/12- pleasant and co operative. Remains disoriented, confused with poor memory. Slept well without prn med. No paranoid ideation  7/13- pleasant and confused. No behavior issue. Likes to color during group activities. Accepting med. Sleep is better  7/14- no behavior problem overnight. pleasant and with memory impairment. Need total care, bowel and bladder incontinent  7/15- no sig change in presentation, no behavior problem. Ready for placement. Memory impairment  7/16- poor memory, need total care, sleep is better and no behavior issue. SIDE EFFECTS: (reviewed/updated 7/16/2018)  None reported or admitted to. No noted toxicity with use of current med   ALLERGIES:(reviewed/updated 7/16/2018)  Allergies   Allergen Reactions    Cephalosporins Itching    Codeine Hives    Erythromycin Hives    Nubain [Nalbuphine] Hives    Pcn [Penicillins] Hives    Percodan [Oxycodone Hcl-Oxycodone-Asa] Hives    Promethazine Hives    Protonix [Pantoprazole] Diarrhea    Sulfa (Sulfonamide Antibiotics) Hives    Toradol [Ketorolac] Hives    Ultram [Tramadol] Hives    Valium [Diazepam] Hives    Prilosec [Omeprazole] Itching      MEDICATIONS PRIOR TO ADMISSION:(reviewed/updated 7/16/2018)  Prescriptions Prior to Admission   Medication Sig    multivit-minerals/folic acid (WOMEN'S MULTIVITAMIN GUMMIES PO) Take 1 Tab by mouth daily.  QUEtiapine (SEROQUEL) 25 mg tablet Take 12.5 mg by mouth daily.  carvedilol (COREG) 3.125 mg tablet Take 3.125 mg by mouth two (2) times daily (with meals).  diclofenac (VOLTAREN) 1 % gel Apply 2 g to affected area every six (6) hours as needed (arthritis pain).     acetaminophen (TYLENOL) 325 mg tablet Take 650 mg by mouth every eight (8) hours as needed for Pain.  Menthol (ASPERCREME HEAT) 10 % gel by Apply Externally route every eight (8) hours as needed (arthritis).  aspirin 81 mg chewable tablet Take 81 mg by mouth daily. Indications: myocardial infarction prevention    dilTIAZem XR (DILACOR XR) 120 mg XR capsule Take 120 mg by mouth daily. Indications: hypertension, VENTRICULAR RATE CONTROL IN ATRIAL FIBRILLATION    furosemide (LASIX) 20 mg tablet Take 20 mg by mouth daily as needed. Indications: Edema, hypertension    loratadine (CLARITIN) 10 mg tablet Take 10 mg by mouth daily as needed. Indications: Allergic Rhinitis, SNEEZING    famotidine (PEPCID) 20 mg tablet Take 20 mg by mouth daily. Indications: Heartburn    QUEtiapine (SEROQUEL) 25 mg tablet Take 25 mg by mouth nightly. Indications: Generalized Anxiety Disorder      PAST MEDICAL HISTORY: Past medical history from the initial psychiatric evaluation has been reviewed (reviewed/updated 7/16/2018) with no additional updates (I asked patient and no additional past medical history provided). No past medical history on file. No past surgical history on file. SOCIAL HISTORY: Social history from the initial psychiatric evaluation has been reviewed (reviewed/updated 7/16/2018) with no additional updates (I asked patient and no additional social history provided). Social History     Social History    Marital status: SINGLE     Spouse name: N/A    Number of children: N/A    Years of education: N/A     Occupational History    Not on file. Social History Main Topics    Smoking status: Not on file    Smokeless tobacco: Not on file    Alcohol use Not on file    Drug use: Not on file    Sexual activity: Not on file     Other Topics Concern    Not on file     Social History Narrative      FAMILY HISTORY: Family history from the initial psychiatric evaluation has been reviewed (reviewed/updated 7/16/2018) with no additional updates (I asked patient and no additional family history provided). No family history on file. REVIEW OF SYSTEMS: (reviewed/updated 7/16/2018)  Appetite:poor   Sleep: decreased more than normal   All other Review of Systems: Psychological ROS: positive for - behavioral disorder, concentration difficulties, disorientation and memory difficulties         [address] [facility] [place] [place]    MSE FINDINGS ARE WITHIN NORMAL LIMITS (WNL) UNLESS OTHERWISE STATED BELOW. ( ALL OF THE BELOW CATEGORIES OF THE MSE HAVE BEEN REVIEWED (reviewed 7/16/2018) AND UPDATED AS DEEMED APPROPRIATE )  General Presentation age appropriate and casually dressed, unreliable and vague   Orientation disorganized   Vital Signs  See below (reviewed 7/16/2018); Vital Signs (BP, Pulse, & Temp) are within normal limits if not listed below.    Gait and Station Stable/steady, no ataxia   Musculoskeletal System No extrapyramidal symptoms (EPS); no abnormal muscular movements or Tardive Dyskinesia (TD); muscle strength and tone are within normal limits   Language No aphasia or dysarthria   Speech:  hypoverbal   Thought Processes illogical; slow rate of thoughts; poor abstract reasoning/computation   Thought Associations tangential   Thought Content free of delusions, free of hallucinations and preoccupations   Suicidal Ideations none   Homicidal Ideations none   Mood:  Less anxious   Affect:  anxious and increased in intensity   Memory recent  impaired   Memory remote:  impaired   Concentration/Attention:  distractable   Fund of Knowledge below average   Insight:  poor   Reliability poor   Judgment:  poor          VITALS:     Patient Vitals for the past 24 hrs:   Pulse Resp BP   07/16/18 0952 69 - 108/44   07/16/18 0950 69 - 108/44   07/16/18 0642 (!) 58 16 132/54   07/15/18 1706 65 - 134/55     Wt Readings from Last 3 Encounters:   06/18/18 54.6 kg (120 lb 4.8 oz)     Temp Readings from Last 3 Encounters:   07/14/18 97 °F (36.1 °C)   06/17/18 97.6 °F (36.4 °C)   06/15/18 97.6 °F (36.4 °C) BP Readings from Last 3 Encounters:   07/16/18 108/44   06/17/18 102/47   06/16/18 99/55     Pulse Readings from Last 3 Encounters:   07/16/18 69   06/17/18 70   06/16/18 81            DATA     LABORATORY DATA:(reviewed/updated 7/16/2018)  No results found for this or any previous visit (from the past 24 hour(s)). No results found for: VALF2, VALAC, VALP, VALPR, DS6, CRBAM, CRBAMP, CARB2, XCRBAM  No results found for: LITHM   RADIOLOGY REPORTS:(reviewed/updated 7/16/2018)  Xr Chest Port    Result Date: 6/17/2018  EXAM:  XR CHEST PORT INDICATION:  Chest Pain COMPARISON:  None. FINDINGS: A portable AP radiograph of the chest was obtained at 720 hours. The patient is on a cardiac monitor. There is minor right basilar atelectasis. There is question of a vague right perihilar airspace process. There is slight diffuse interstitial prominence. Heart size is borderline enlarged. Patient status post median sternotomy. IMPRESSION: 1. There is suggestion of a vague right perihilar airspace process and follow-up studies would be helpful to assess for interval change. There is minor right basilar atelectasis.           MEDICATIONS     ALL MEDICATIONS:   Current Facility-Administered Medications   Medication Dose Route Frequency    risperiDONE (RisperDAL) 1 mg/mL oral solution soln 1 mg  1 mg Oral QHS    memantine (NAMENDA) tablet 10 mg  10 mg Oral BID    dilTIAZem ER (CARDIZEM LA) tablet 120 mg  120 mg Oral DAILY    ziprasidone (GEODON) 10 mg in sterile water (preservative free) 0.5 mL injection  10 mg IntraMUSCular BID PRN    benztropine (COGENTIN) tablet 1 mg  1 mg Oral BID PRN    benztropine (COGENTIN) injection 1 mg  1 mg IntraMUSCular BID PRN    acetaminophen (TYLENOL) tablet 650 mg  650 mg Oral Q4H PRN    magnesium hydroxide (MILK OF MAGNESIA) 400 mg/5 mL oral suspension 30 mL  30 mL Oral DAILY PRN    nicotine (NICODERM CQ) 21 mg/24 hr patch 1 Patch  1 Patch TransDERmal DAILY PRN    aspirin chewable tablet 81 mg  81 mg Oral DAILY    famotidine (PEPCID) tablet 20 mg  20 mg Oral DAILY    carvedilol (COREG) tablet 3.125 mg  3.125 mg Oral BID WITH MEALS      SCHEDULED MEDICATIONS:   Current Facility-Administered Medications   Medication Dose Route Frequency    risperiDONE (RisperDAL) 1 mg/mL oral solution soln 1 mg  1 mg Oral QHS    memantine (NAMENDA) tablet 10 mg  10 mg Oral BID    dilTIAZem ER (CARDIZEM LA) tablet 120 mg  120 mg Oral DAILY    aspirin chewable tablet 81 mg  81 mg Oral DAILY    famotidine (PEPCID) tablet 20 mg  20 mg Oral DAILY    carvedilol (COREG) tablet 3.125 mg  3.125 mg Oral BID WITH MEALS          ASSESSMENT & PLAN     DIAGNOSES REQUIRING ACTIVE TREATMENT AND MONITORING: (reviewed/updated 7/16/2018)  Patient Active Hospital Problem List:   Dementia (6/17/2018)    Assessment: moderate to severe- disoriented, poor memory and irritable. Need total care. No behavior problem and accepting med    Plan:  Dorota Wong. Risperdal    Hypotension  A: low bp  P:ct with current tx-IM to follow    7/16-  waiting for placement- ct with current tx plan-  liq Risperdal.  Waiting for placement. Pt has low BP- request IM to re evaluate    In summary, Victoriano Nix, is a 66 y.o.  female who presents with a severe exacerbation of the principal diagnosis of Dementia  Patient's condition is /not stable . Patient requires continued inpatient hospitalization for further stabilization, safety monitoring and medication management. I will continue to coordinate the provision of individual, milieu, occupational, group, and substance abuse therapies to address target symptoms/diagnoses as deemed appropriate for the individual patient. A coordinated, multidisplinary treatment team round was conducted with the patient (this team consists of the nurse, psychiatric unit pharmcist,  and writer).      Complete current electronic health record for patient has been reviewed today including consultant notes, ancillary staff notes, nurses and psychiatric tech notes. Suicide risk assessment completed and patient deemed to be of low risk for suicide at this time. The following regarding medications was addressed during rounds with patient:   the risks and benefits of the proposed medication. The patient was given the opportunity to ask questions. Informed consent given to the use of the above medications. Will continue to adjust psychiatric and non-psychiatric medications (see above \"medication\" section and orders section for details) as deemed appropriate & based upon diagnoses and response to treatment. I will continue to order blood tests/labs and diagnostic tests as deemed appropriate and review results as they become available (see orders for details and above listed lab/test results). I will order psychiatric records from previous [facility] hospitals to further elucidate the nature of patient's psychopathology and review once available. I will gather additional collateral information from friends, family and o/p treatment team to further elucidate the nature of patient's psychopathology and baselline level of psychiatric functioning. I certify that this patient's inpatient psychiatric hospital services furnished since the previous certification were, and continue to be, required for treatment that could reasonably be expected to improve the patient's condition, or for diagnostic study, and that the patient continues to need, on a daily basis, active treatment furnished directly by or requiring the supervision of inpatient psychiatric facility personnel. In addition, the hospital records show that services furnished were intensive treatment services, admission or related services, or equivalent services.     EXPECTED DISCHARGE DATE/DAY: TBD     DISPOSITION: Home/longterm       Signed By:   Kelly Cline MD  7/16/2018

## 2018-07-16 NOTE — BH NOTES
Pt medication and meals complaint  slept most of the morning ate a late breakfast Pt ate lunch to Pt get along with others Pt behavioral wasn't no problem Pt will remain on Q 15 checks.

## 2018-07-16 NOTE — BH NOTES
[facility] [employer] -  Phone: [phone] was contacted for recommitment and given [facility] crisis information.

## 2018-07-16 NOTE — BH NOTES
Pt rested quietly in bed with eyes closed. No signs/symptoms of distress, agitation, or anxiety. Will monitor for changes. Q 15 minute checks continue. Pt rested quietly in bed with eyes closed. Slept 7 hrs.

## 2018-07-17 PROCEDURE — 74011250637 HC RX REV CODE- 250/637: Performed by: PSYCHIATRY & NEUROLOGY

## 2018-07-17 PROCEDURE — 65220000003 HC RM SEMIPRIVATE PSYCH

## 2018-07-17 PROCEDURE — 74011250637 HC RX REV CODE- 250/637: Performed by: INTERNAL MEDICINE

## 2018-07-17 RX ADMIN — CARVEDILOL 3.12 MG: 3.12 TABLET, FILM COATED ORAL at 08:03

## 2018-07-17 RX ADMIN — MEMANTINE 10 MG: 10 TABLET ORAL at 08:02

## 2018-07-17 RX ADMIN — FAMOTIDINE 20 MG: 20 TABLET ORAL at 08:02

## 2018-07-17 RX ADMIN — CARVEDILOL 3.12 MG: 3.12 TABLET, FILM COATED ORAL at 17:19

## 2018-07-17 RX ADMIN — MEMANTINE 10 MG: 10 TABLET ORAL at 17:19

## 2018-07-17 RX ADMIN — ASPIRIN 81 MG 81 MG: 81 TABLET ORAL at 08:03

## 2018-07-17 RX ADMIN — Medication 1 MG: at 21:39

## 2018-07-17 RX ADMIN — DILTIAZEM HYDROCHLORIDE 120 MG: 120 TABLET, EXTENDED RELEASE ORAL at 08:03

## 2018-07-17 NOTE — PROGRESS NOTES
Follow Up Nutrition Assessment:     INTERVENTIONS/RECOMMENDATIONS:   · Meals/Snacks: General/healthful diet:  Continue regular diet.     ASSESSMENT:   7/17:  Chart reviewed; med noted for dementia on admission. Plans are nursing home placement,  Tolerating regular diet. Had a bout of n/v but this has resolved.     Diet Order: Regular  % Eaten:  100%.    Pertinent Medications: [x]Reviewed []Other  Pertinent Labs: [x]Reviewed []Other  Food Allergies: [x]None []Other   Last BM:                     [x]Active     []Hyperactive  []Hypoactive       [] Absent BS  Skin:               [x] Intact                        [] Incision                     [] Breakdown                                    [] Other:     Anthropometrics:   Height: 5' 1.25\" (155.6 cm)              Weight: 54.6 kg (120 lb 4.8 oz)                     IBW (%IBW):   ( )                  UBW (%UBW):   (  %)   Last Weight Metrics:  Weight Loss Metrics 7/17/2018 6/17/2018   Today's Wt 120 lb 4.8 oz -   BMI - 22.55 kg/m2         BMI: Body mass index is 22.55 kg/(m^2). This BMI is indicative of:   []Underweight    [x]Normal    []Overweight    [] Obesity   [] Extreme Obesity (BMI>40)      Estimated Nutrition Needs (Based on):   1171 Kcals/day (BMR (976) x 1. 2AF) , 55 g (1.0 g/kg bw) Protein  Carbohydrate:  At Least 130 g/day  Fluids: 1200 mL/day (1ml/kcal)     NUTRITION DIAGNOSES:   Problem:  No nutritional diagnosis at this time      Etiology: related to       Signs/Symptoms: as evidenced by         NUTRITION INTERVENTIONS:  Meals/Snacks: General/healthful diet                   GOAL:   PO intake >50% of meals next 5-7 days     LEARNING NEEDS (Diet, Food/Nutrient-Drug Interaction):    [x] None Identified   [] Identified and Education Provided/Documented   [] Identified and Pt declined/was not appropriate      Cultureal, Lutheran, OR Ethnic Dietary Needs:    [x] None Identified   [] Identified and Addressed      [x] Interdisciplinary Care Plan Reviewed/Documented    [x] Discharge Planning:   Continue regular diet     MONITORING /EVALUATION:   Food/Nutrient Intake Outcomes:  Total energy intake  Physical Signs/Symptoms Outcomes: Weight/weight change     NUTRITION RISK:    [x] Patient At Nutritional Risk                         [] High              [] Moderate/Mild           [x]  Low                                       [] Patient Not At Nutritional Risk     PT SEEN FOR:    []  MD Consult:           []Calorie Count                                                                     []Diabetic Diet Education                                                                                                                                    []Diet Education                                                                    []Electrolyte Management                                                                    [x]General Nutrition Management and Supplements                                                                    []Management of Tube Feeding                                                                    []TPN Recommendations    []  RN Referral:                       []MST score >=2                                                                    []Enteral/Parenteral Nutrition PTA                                                                    []Pregnant: Gestational DM or Multigestation                                                                    []Pressure Ulcer/Wound Care needs      []  Low BMI

## 2018-07-17 NOTE — PROGRESS NOTES
General Daily Progress Note    Admit Date: 6/17/2018    Subjective:     Asked to see patient regarding adjustment of antihypertensives due to periods of hypotension. Reviewing BP for the last 24hr seem it is well controlled. Current Facility-Administered Medications   Medication Dose Route Frequency    risperiDONE (RisperDAL) 1 mg/mL oral solution soln 1 mg  1 mg Oral QHS    memantine (NAMENDA) tablet 10 mg  10 mg Oral BID    dilTIAZem ER (CARDIZEM LA) tablet 120 mg  120 mg Oral DAILY    ziprasidone (GEODON) 10 mg in sterile water (preservative free) 0.5 mL injection  10 mg IntraMUSCular BID PRN    benztropine (COGENTIN) tablet 1 mg  1 mg Oral BID PRN    benztropine (COGENTIN) injection 1 mg  1 mg IntraMUSCular BID PRN    acetaminophen (TYLENOL) tablet 650 mg  650 mg Oral Q4H PRN    magnesium hydroxide (MILK OF MAGNESIA) 400 mg/5 mL oral suspension 30 mL  30 mL Oral DAILY PRN    nicotine (NICODERM CQ) 21 mg/24 hr patch 1 Patch  1 Patch TransDERmal DAILY PRN    aspirin chewable tablet 81 mg  81 mg Oral DAILY    famotidine (PEPCID) tablet 20 mg  20 mg Oral DAILY    carvedilol (COREG) tablet 3.125 mg  3.125 mg Oral BID WITH MEALS            Objective:     Patient Vitals for the past 8 hrs:   BP Temp Pulse Resp SpO2   07/17/18 1723 121/83 97.5 °F (36.4 °C) 70 16 97 %   07/17/18 1719 121/83 - 70 - -   07/17/18 1152 130/57 97.4 °F (36.3 °C) 60 16 97 %             Physical Exam:   Visit Vitals    /83 (BP 1 Location: Left arm, BP Patient Position: Sitting)    Pulse 70    Temp 97.5 °F (36.4 °C)    Resp 16    Ht 5' 1.25\" (1.556 m)    Wt 54.9 kg (121 lb)    SpO2 97%    Breastfeeding No    BMI 22.68 kg/m2     General:  Alert, cooperative, no distress, appears stated age. Head:  Normocephalic, without obvious abnormality, atraumatic. Eyes:  Conjunctivae/corneas clear. PERRL, EOMs intact. Lungs:   Clear to auscultation bilaterally. Chest wall:  No tenderness or deformity.    Heart: Regular rate and rhythm, S1, S2 normal, no murmur, click, rub or gallop. Abdomen:   Soft, non-tender. Bowel sounds normal. No masses,  No organomegaly. Extremities: Extremities normal, atraumatic, no cyanosis or edema. Pulses: Deferred. Skin: Skin color, texture, turgor normal. No rashes or lesions             No results found for this or any previous visit (from the past 24 hour(s)). Assessment:     Principal Problem:    Dementia (6/17/2018)    HTN    Plan:     BP appears well controlled. Apply parameters to BP medications so they are held for low BP.

## 2018-07-17 NOTE — PROGRESS NOTES
Problem: Dementia-BEHAVIORAL HEALTH (Adult/Pediatric)  Goal: *STG: Remains safe in hospital  Outcome: Progressing Towards Goal  Pt is visible on the unit, sitting in the dayroom for most of the shift but with minimal interaction with peers and staff. Pt c/o of generalized pain. Administered PRM tylenol. Medication effective. Pt denies any other needs. Appetite is good. Will continue to monitor pt q15 minutes.

## 2018-07-17 NOTE — BH NOTES
Patient remains confused at all times. Thinks she's in [place], [place]. Affect is flat. Patient thought processes display poverty of thoughts. Patient has been visible in the milieu and has been sitting quietly. No aggressive behavior observed. Patient require assistance with ADL's, abut can feed self.

## 2018-07-17 NOTE — BH NOTES
Pt was covered in feces this morning. Staff discovered pt had taken her clothes off and threw them on the floor. Staff x2 was able to get pt up and assist with ADL's. Pt was aggressive with staff and we were not able to get pt to the shower but she did allow us to basin wash her. Nursing supervisor came to assist as her bed was covered in feces as well and NS cleaned the bed. Pt clothes are in the washing machine and may need a double wash for the amount of feces that was on them. Pt will still need a shower as well so between day and evening shift if pt will tolerate. Pt refused am vital check as well. Pt is now in the dayroom watching television.

## 2018-07-17 NOTE — BH NOTES
GROUP THERAPY PROGRESS NOTE    The patient Joy lew 66 y.o. female is participating in [employer]. Group time: 1 hour    Personal goal for participation:  To concentrate on selected task    Goal orientation: social    Group therapy participation: minimal    Therapeutic interventions reviewed and discussed: Crafts, games, music    Impression of participation: The patient was present-pleasant upon approach    Deepak Carrasco  7/17/2018  5:13 PM

## 2018-07-17 NOTE — BH NOTES
GROUP THERAPY PROGRESS NOTE    The patient Chloe Im a 66 y.o. female is participating in [employer].     Group time: 30 minutes    Personal goal for participation: Daily goal setting    Goal orientation: personal    Group therapy participation:     Therapeutic interventions reviewed and discussed:  Yes    Impression of participation:     Dylon Bernard  7/16/2018  9:05 PM

## 2018-07-17 NOTE — BH NOTES
PSYCHIATRIC PROGRESS NOTE         Patient Name  Darshana Santiago   Date of Birth 1940   [facility] [id number]   Medical Record Number  [id number]      Age  66 y.o. PCP Berenice Spears, MD   Admit date:  6/17/2018    Room Number  321/01  @ [facility]   Date of Service  7/17/2018           E & M PROGRESS NOTE:         HISTORY       CC:  \"confused, memory impairment\"  HISTORY OF PRESENT ILLNESS/INTERVAL HISTORY:  (reviewed/updated 7/17/2018). per initial evaluation: The patient, Darshana Santiago, is a 68 y.o. WHITE [place]  female with a past psychiatric history significant for dementia, who presents at this time with complaints of (and/or evidence of) the following emotional symptoms: memory impairment. Additional symptomatology include disoriented to time and place, poor memory and behavior issues. pt has been incontent and need assistance with ADLs. She is irritable and easily gets loud and labile in mood. She was dc to medical unit for tx of hypotension and now back on U for further tx  The above symptoms have been present for years. These symptoms are of severe severity. These symptoms are constant  in nature. Darshana Santiago presents/reports/evidences the following emotional symptoms today, 7/17/2018:memory impairment. The above symptoms have been present for years. These symptoms are of moderate severity. The symptoms are constant  in nature. Additional symptomatology and features include confused , disoriented to place and time. Pt is now accepting her med and need assistance with ADLs due to incontinence. No aggressive behavior and affect is bright , sleep is improving, pleasant and co operative. Received no prn.  6/25/18 she is compliant with medications and no anger issues like before and she gets confused and disoriented   6/26/18 she is doing better and mood is better and no anger issues and no SI or HI and slept well last night  6/27/18 she is the same and she is not engaging well and she has no anger issues and slept fairly last night but she was sleepy this morning and not engaging    6/28- remains confused with memory impairment. Pt has been hallucinating and paranoid- hiding behind her bed stating a women has been raped and stabbed. Received prn med. Sleep is better  6/29- episode of paranoid and mostly sun downing. Appear pleasant in the morning and no recollection of night time paranoia. unsteady gait due to sedation  6/30- pleasant and confused during the daytime, eating her breakfast. Still has paranoid behavior at night  7/1- slow progress as less sundowning and less paranoid. Pleasantly confused during the day time with no sig behavior problem  7/2- confused and disoriented with poor memory. Sun downing and worse in the evening with paranoia. 7/3- paranoid at night about a man coming to her room and killing. Fearful but pt is calm, pleasantly confused during daytime. Accepting med and no aggressive behavior  7/4- no sig behavior issue overnight and sleep is improved. Accepting med. Pleasant confused and with memory impairment  7/5- sleep is poor and remains paranoid at night seeing dead people. Need assistance with ADLs. No behavior issue  7/6- disturbed sleep last nigh when code s was called due to pt not responding- later cancelled as pt's vital were within normal and cxr was wnl to rule out aspiration. Pt is currently in bed responding to command and appear to be in no distress. Poor memory and attention  7/7-Pt was seen in her room with the RN. Presents alert, verbal, oriented to self only. Affect is flat. States she is feeling fine. Prn's used-Ambien 5 mg. No AVH or SI/HI. 7/8- Pt is incontinent of bowel and bladder, becomes irritable during ADL care. No significant changes from yesterday. Awaiting placement. 7/9- remains confused and disoriented to time and place. Pleasant and sleep is better. No behavior problem. Accepting med.    7/10- remains pleasant and confused with no behavior problem. Accepting med. Sleep is better. Poor memory and disoriented to time/place. 7/11- pleasant and co operative during the interview. She remains confused with poor memory. No delusional themes and sleep is improving.  7/12- pleasant and co operative. Remains disoriented, confused with poor memory. Slept well without prn med. No paranoid ideation  7/13- pleasant and confused. No behavior issue. Likes to color during group activities. Accepting med. Sleep is better  7/14- no behavior problem overnight. pleasant and with memory impairment. Need total care, bowel and bladder incontinent  7/15- no sig change in presentation, no behavior problem. Ready for placement. Memory impairment  7/16- poor memory, need total care, sleep is better and no behavior issue. 7/17- irritable when attended for ADLS/incontinence of bladder and bowel. Overall no physical aggression and pleasant and confuse. Accepting med      SIDE EFFECTS: (reviewed/updated 7/17/2018)  None reported or admitted to. No noted toxicity with use of current med   ALLERGIES:(reviewed/updated 7/17/2018)  Allergies   Allergen Reactions    Cephalosporins Itching    Codeine Hives    Erythromycin Hives    Nubain [Nalbuphine] Hives    Pcn [Penicillins] Hives    Percodan [Oxycodone Hcl-Oxycodone-Asa] Hives    Promethazine Hives    Protonix [Pantoprazole] Diarrhea    Sulfa (Sulfonamide Antibiotics) Hives    Toradol [Ketorolac] Hives    Ultram [Tramadol] Hives    Valium [Diazepam] Hives    Prilosec [Omeprazole] Itching      MEDICATIONS PRIOR TO ADMISSION:(reviewed/updated 7/17/2018)  Prescriptions Prior to Admission   Medication Sig    multivit-minerals/folic acid (WOMEN'S MULTIVITAMIN GUMMIES PO) Take 1 Tab by mouth daily.  QUEtiapine (SEROQUEL) 25 mg tablet Take 12.5 mg by mouth daily.  carvedilol (COREG) 3.125 mg tablet Take 3.125 mg by mouth two (2) times daily (with meals).     diclofenac (VOLTAREN) 1 % gel Apply 2 g to affected area every six (6) hours as needed (arthritis pain).  acetaminophen (TYLENOL) 325 mg tablet Take 650 mg by mouth every eight (8) hours as needed for Pain.  Menthol (ASPERCREME HEAT) 10 % gel by Apply Externally route every eight (8) hours as needed (arthritis).  aspirin 81 mg chewable tablet Take 81 mg by mouth daily. Indications: myocardial infarction prevention    dilTIAZem XR (DILACOR XR) 120 mg XR capsule Take 120 mg by mouth daily. Indications: hypertension, VENTRICULAR RATE CONTROL IN ATRIAL FIBRILLATION    furosemide (LASIX) 20 mg tablet Take 20 mg by mouth daily as needed. Indications: Edema, hypertension    loratadine (CLARITIN) 10 mg tablet Take 10 mg by mouth daily as needed. Indications: Allergic Rhinitis, SNEEZING    famotidine (PEPCID) 20 mg tablet Take 20 mg by mouth daily. Indications: Heartburn    QUEtiapine (SEROQUEL) 25 mg tablet Take 25 mg by mouth nightly. Indications: Generalized Anxiety Disorder      PAST MEDICAL HISTORY: Past medical history from the initial psychiatric evaluation has been reviewed (reviewed/updated 7/17/2018) with no additional updates (I asked patient and no additional past medical history provided). No past medical history on file. No past surgical history on file. SOCIAL HISTORY: Social history from the initial psychiatric evaluation has been reviewed (reviewed/updated 7/17/2018) with no additional updates (I asked patient and no additional social history provided). Social History     Social History    Marital status: SINGLE     Spouse name: N/A    Number of children: N/A    Years of education: N/A     Occupational History    Not on file.      Social History Main Topics    Smoking status: Not on file    Smokeless tobacco: Not on file    Alcohol use Not on file    Drug use: Not on file    Sexual activity: Not on file     Other Topics Concern    Not on file     Social History Narrative      FAMILY HISTORY: Family history from the initial psychiatric evaluation has been reviewed (reviewed/updated 7/17/2018) with no additional updates (I asked patient and no additional family history provided). No family history on file. REVIEW OF SYSTEMS: (reviewed/updated 7/17/2018)  Appetite:poor   Sleep: decreased more than normal   All other Review of Systems: Psychological ROS: positive for - behavioral disorder, concentration difficulties, disorientation and memory difficulties         [facility] (MSE):    MSE FINDINGS ARE WITHIN NORMAL LIMITS (WNL) UNLESS OTHERWISE STATED BELOW. ( ALL OF THE BELOW CATEGORIES OF THE MSE HAVE BEEN REVIEWED (reviewed 7/17/2018) AND UPDATED AS DEEMED APPROPRIATE )  General Presentation age appropriate and casually dressed, unreliable and vague   Orientation disorganized   Vital Signs  See below (reviewed 7/17/2018); Vital Signs (BP, Pulse, & Temp) are within normal limits if not listed below.    Gait and Station Stable/steady, no ataxia   Musculoskeletal System No extrapyramidal symptoms (EPS); no abnormal muscular movements or Tardive Dyskinesia (TD); muscle strength and tone are within normal limits   Language No aphasia or dysarthria   Speech:  hypoverbal   Thought Processes illogical; slow rate of thoughts; poor abstract reasoning/computation   Thought Associations tangential   Thought Content free of delusions, free of hallucinations and preoccupations   Suicidal Ideations none   Homicidal Ideations none   Mood:  Less anxious   Affect:  anxious and increased in intensity   Memory recent  impaired   Memory remote:  impaired   Concentration/Attention:  distractable   Fund of Knowledge below average   Insight:  poor   Reliability poor   Judgment:  poor          VITALS:     Patient Vitals for the past 24 hrs:   Temp Pulse Resp BP SpO2   07/16/18 1651 98.2 °F (36.8 °C) 71 16 135/60 96 %     Wt Readings from Last 3 Encounters:   07/17/18 54.9 kg (121 lb)     Temp Readings from Last 3 Encounters:   07/16/18 98.2 °F (36.8 °C)   06/17/18 97.6 °F (36.4 °C)   06/15/18 97.6 °F (36.4 °C)     BP Readings from Last 3 Encounters:   07/16/18 135/60   06/17/18 102/47   06/16/18 99/55     Pulse Readings from Last 3 Encounters:   07/16/18 71   06/17/18 70   06/16/18 81            DATA     LABORATORY DATA:(reviewed/updated 7/17/2018)  No results found for this or any previous visit (from the past 24 hour(s)). No results found for: VALF2, VALAC, VALP, VALPR, DS6, CRBAM, CRBAMP, CARB2, XCRBAM  No results found for: LITHM   RADIOLOGY REPORTS:(reviewed/updated 7/17/2018)  Xr Chest Port    Result Date: 6/17/2018  EXAM:  XR CHEST PORT INDICATION:  Chest Pain COMPARISON:  None. FINDINGS: A portable AP radiograph of the chest was obtained at 720 hours. The patient is on a cardiac monitor. There is minor right basilar atelectasis. There is question of a vague right perihilar airspace process. There is slight diffuse interstitial prominence. Heart size is borderline enlarged. Patient status post median sternotomy. IMPRESSION: 1. There is suggestion of a vague right perihilar airspace process and follow-up studies would be helpful to assess for interval change. There is minor right basilar atelectasis.           MEDICATIONS     ALL MEDICATIONS:   Current Facility-Administered Medications   Medication Dose Route Frequency    risperiDONE (RisperDAL) 1 mg/mL oral solution soln 1 mg  1 mg Oral QHS    memantine (NAMENDA) tablet 10 mg  10 mg Oral BID    dilTIAZem ER (CARDIZEM LA) tablet 120 mg  120 mg Oral DAILY    ziprasidone (GEODON) 10 mg in sterile water (preservative free) 0.5 mL injection  10 mg IntraMUSCular BID PRN    benztropine (COGENTIN) tablet 1 mg  1 mg Oral BID PRN    benztropine (COGENTIN) injection 1 mg  1 mg IntraMUSCular BID PRN    acetaminophen (TYLENOL) tablet 650 mg  650 mg Oral Q4H PRN    magnesium hydroxide (MILK OF MAGNESIA) 400 mg/5 mL oral suspension 30 mL  30 mL Oral DAILY PRN    nicotine (NICODERM CQ) 21 mg/24 hr patch 1 Patch  1 Patch TransDERmal DAILY PRN    aspirin chewable tablet 81 mg  81 mg Oral DAILY    famotidine (PEPCID) tablet 20 mg  20 mg Oral DAILY    carvedilol (COREG) tablet 3.125 mg  3.125 mg Oral BID WITH MEALS      SCHEDULED MEDICATIONS:   Current Facility-Administered Medications   Medication Dose Route Frequency    risperiDONE (RisperDAL) 1 mg/mL oral solution soln 1 mg  1 mg Oral QHS    memantine (NAMENDA) tablet 10 mg  10 mg Oral BID    dilTIAZem ER (CARDIZEM LA) tablet 120 mg  120 mg Oral DAILY    aspirin chewable tablet 81 mg  81 mg Oral DAILY    famotidine (PEPCID) tablet 20 mg  20 mg Oral DAILY    carvedilol (COREG) tablet 3.125 mg  3.125 mg Oral BID WITH MEALS          ASSESSMENT & PLAN     DIAGNOSES REQUIRING ACTIVE TREATMENT AND MONITORING: (reviewed/updated 7/17/2018)  Patient Active Hospital Problem List:   Dementia (6/17/2018)    Assessment: moderate to severe- disoriented, poor memory and irritable. Need total care. No behavior problem and accepting med    Plan:  Grey Lund. Risperdal    Hypotension  A: low bp  P:ct with current tx-IM to follow    7/17-  waiting for placement- ct with current tx plan-behavior plan when attending to ADLs  Pt has low BP- request IM to re evaluate    In summary, Stephanie Monsalve, is a 66 y.o.  female who presents with a severe exacerbation of the principal diagnosis of Dementia  Patient's condition is /not stable . Patient requires continued inpatient hospitalization for further stabilization, safety monitoring and medication management. I will continue to coordinate the provision of individual, milieu, occupational, group, and substance abuse therapies to address target symptoms/diagnoses as deemed appropriate for the individual patient. A coordinated, multidisplinary treatment team round was conducted with the patient (this team consists of the nurse, psychiatric unit pharmcist,  and writer).      Complete current electronic health record for patient has been reviewed today including consultant notes, ancillary staff notes, nurses and psychiatric tech notes. Suicide risk assessment completed and patient deemed to be of low risk for suicide at this time. The following regarding medications was addressed during rounds with patient:   the risks and benefits of the proposed medication. The patient was given the opportunity to ask questions. Informed consent given to the use of the above medications. Will continue to adjust psychiatric and non-psychiatric medications (see above \"medication\" section and orders section for details) as deemed appropriate & based upon diagnoses and response to treatment. I will continue to order blood tests/labs and diagnostic tests as deemed appropriate and review results as they become available (see orders for details and above listed lab/test results). I will order psychiatric records from previous [facility] hospitals to further elucidate the nature of patient's psychopathology and review once available. I will gather additional collateral information from friends, family and o/p treatment team to further elucidate the nature of patient's psychopathology and baselline level of psychiatric functioning. I certify that this patient's inpatient psychiatric hospital services furnished since the previous certification were, and continue to be, required for treatment that could reasonably be expected to improve the patient's condition, or for diagnostic study, and that the patient continues to need, on a daily basis, active treatment furnished directly by or requiring the supervision of inpatient psychiatric facility personnel. In addition, the hospital records show that services furnished were intensive treatment services, admission or related services, or equivalent services.     EXPECTED DISCHARGE DATE/DAY: TBD     DISPOSITION: Home/VICENTE       Signed By:   Elisabeth Yepez MD  7/17/2018

## 2018-07-17 NOTE — BH NOTES
GROUP THERAPY PROGRESS NOTE    The patient Nisreen Velasquez a 66 y.o. female is participating in [address] [place] [address]. Group time: 45 minutes    Personal goal for participation:  To participate in chair yoga routine    Goal orientation:  relaxation    Group therapy participation: minimal    Therapeutic interventions reviewed and discussed: benefits of yoga/other relaxation techniques    Impression of participation:  The patient was present-elected to watch-did engage in discussion with prompting    Lane Preciado  7/17/2018  1:24 PM

## 2018-07-18 PROCEDURE — 65220000003 HC RM SEMIPRIVATE PSYCH

## 2018-07-18 PROCEDURE — 74011250637 HC RX REV CODE- 250/637: Performed by: PSYCHIATRY & NEUROLOGY

## 2018-07-18 PROCEDURE — 74011250637 HC RX REV CODE- 250/637: Performed by: INTERNAL MEDICINE

## 2018-07-18 RX ORDER — DILTIAZEM HYDROCHLORIDE 120 MG/1
120 CAPSULE, COATED, EXTENDED RELEASE ORAL DAILY
Status: DISCONTINUED | OUTPATIENT
Start: 2018-07-19 | End: 2018-07-30 | Stop reason: HOSPADM

## 2018-07-18 RX ADMIN — Medication 1 MG: at 21:45

## 2018-07-18 RX ADMIN — FAMOTIDINE 20 MG: 20 TABLET ORAL at 08:07

## 2018-07-18 RX ADMIN — DILTIAZEM HYDROCHLORIDE 120 MG: 120 TABLET, EXTENDED RELEASE ORAL at 08:07

## 2018-07-18 RX ADMIN — MEMANTINE 10 MG: 10 TABLET ORAL at 08:07

## 2018-07-18 RX ADMIN — CARVEDILOL 3.12 MG: 3.12 TABLET, FILM COATED ORAL at 08:07

## 2018-07-18 RX ADMIN — MEMANTINE 10 MG: 10 TABLET ORAL at 18:46

## 2018-07-18 RX ADMIN — CARVEDILOL 3.12 MG: 3.12 TABLET, FILM COATED ORAL at 18:46

## 2018-07-18 RX ADMIN — ASPIRIN 81 MG 81 MG: 81 TABLET ORAL at 08:07

## 2018-07-18 NOTE — BH NOTES
GROUP THERAPY PROGRESS NOTE    The patient Stephy Sood a 66 y.o. female is participating in [employer]. Group time: 1 hour    Personal goal for participation:  To concentrate on selected task    Goal orientation: social    Group therapy participation: minimal    Therapeutic interventions reviewed and discussed: Crafts, games, music    Impression of participation: The patient was present-arrived late    Fernando Cha  7/18/2018  5:46 PM

## 2018-07-18 NOTE — BH NOTES
GROUP THERAPY PROGRESS NOTE    The patient Shon lew 66 y.o. female is participating in [employer]. Group time: 45 minutes    Personal goal for participation:  To participate in mental health [employer] game    Goal orientation:  personal    Group therapy participation: active    Therapeutic interventions reviewed and discussed: choices in recovery    Impression of participation:  The patient was attentive-required prompting    Julia Santos  7/18/2018  2:24 PM

## 2018-07-18 NOTE — BH NOTES
Pt observed resting quietly, respirations even and unlabored. No s/s distress noted, no complaints voiced. Pt slept 6.5 hours. Will continue Q 15 minute monitoring for safety.

## 2018-07-18 NOTE — PROGRESS NOTES
Mr. Ankit Andrews was present in Spirituality Group about Forgiveness on [facility]     Columba Cramer M.Div.    Paging Service [phone])

## 2018-07-18 NOTE — BH NOTES
7-11 Pt sitting quietly in her bed. Pt was encouraged to go to the day room to be with peers and was offered snack. Pt declined. Pt is calm, quiet and cooperative with staff. No s/s distress noted, no complaints voiced. Will continue to encourage pt to socialize with peers. Will continue Q 15 minute monitoring for safety.

## 2018-07-19 PROCEDURE — 65220000003 HC RM SEMIPRIVATE PSYCH

## 2018-07-19 PROCEDURE — 74011250637 HC RX REV CODE- 250/637: Performed by: PSYCHIATRY & NEUROLOGY

## 2018-07-19 PROCEDURE — 74011250637 HC RX REV CODE- 250/637: Performed by: INTERNAL MEDICINE

## 2018-07-19 RX ADMIN — MEMANTINE 10 MG: 10 TABLET ORAL at 10:40

## 2018-07-19 RX ADMIN — FAMOTIDINE 20 MG: 20 TABLET ORAL at 10:40

## 2018-07-19 RX ADMIN — ASPIRIN 81 MG 81 MG: 81 TABLET ORAL at 10:40

## 2018-07-19 RX ADMIN — CARVEDILOL 3.12 MG: 3.12 TABLET, FILM COATED ORAL at 10:40

## 2018-07-19 RX ADMIN — CARVEDILOL 3.12 MG: 3.12 TABLET, FILM COATED ORAL at 18:42

## 2018-07-19 RX ADMIN — MEMANTINE 10 MG: 10 TABLET ORAL at 18:42

## 2018-07-19 RX ADMIN — DILTIAZEM HYDROCHLORIDE 120 MG: 120 CAPSULE, COATED, EXTENDED RELEASE ORAL at 10:40

## 2018-07-19 RX ADMIN — Medication 1 MG: at 21:29

## 2018-07-19 NOTE — PROGRESS NOTES
Problem: Dementia-BEHAVIORAL HEALTH (Adult/Pediatric)  Goal: *LTG: Obtains optimum level of functioning  Outcome: Not Progressing Towards Goal  Pt slept about 1 hour in 15 min increments overnight. Pt was up in bed, smiling at staff, sitting quietly and looking at belongings. Staff will continue to monitor for health and safety.

## 2018-07-19 NOTE — BH NOTES
PSYCHIATRIC PROGRESS NOTE         Patient Name  Niesha Talley   Date of Birth 1940   [facility] [id number]   Medical Record Number  [id number]      Age  66 y.o. PCP Berenice Spears, MD   Admit date:  6/17/2018    Room Number  321/01  @ [facility]   Date of Service  7/19/2018           E & M PROGRESS NOTE:         HISTORY       CC:  \"confused, memory impairment\"  HISTORY OF PRESENT ILLNESS/INTERVAL HISTORY:  (reviewed/updated 7/19/2018). per initial evaluation: The patient, Niesha Talley, is a 68 y.o. WHITE [place]  female with a past psychiatric history significant for dementia, who presents at this time with complaints of (and/or evidence of) the following emotional symptoms: memory impairment. Additional symptomatology include disoriented to time and place, poor memory and behavior issues. pt has been incontent and need assistance with ADLs. She is irritable and easily gets loud and labile in mood. She was dc to medical unit for tx of hypotension and now back on U for further tx  The above symptoms have been present for years. These symptoms are of severe severity. These symptoms are constant  in nature. Niesha Talley presents/reports/evidences the following emotional symptoms today, 7/19/2018:memory impairment. The above symptoms have been present for years. These symptoms are of moderate severity. The symptoms are constant  in nature. Additional symptomatology and features include confused , disoriented to place and time. Pt is now accepting her med and need assistance with ADLs due to incontinence. No aggressive behavior and affect is bright , sleep is improving, pleasant and co operative. Received no prn.  6/25/18 she is compliant with medications and no anger issues like before and she gets confused and disoriented   6/26/18 she is doing better and mood is better and no anger issues and no SI or HI and slept well last night  6/27/18 she is the same and she is not engaging well and she has no anger issues and slept fairly last night but she was sleepy this morning and not engaging    6/28- remains confused with memory impairment. Pt has been hallucinating and paranoid- hiding behind her bed stating a women has been raped and stabbed. Received prn med. Sleep is better  6/29- episode of paranoid and mostly sun downing. Appear pleasant in the morning and no recollection of night time paranoia. unsteady gait due to sedation  6/30- pleasant and confused during the daytime, eating her breakfast. Still has paranoid behavior at night  7/1- slow progress as less sundowning and less paranoid. Pleasantly confused during the day time with no sig behavior problem  7/2- confused and disoriented with poor memory. Sun downing and worse in the evening with paranoia. 7/3- paranoid at night about a man coming to her room and killing. Fearful but pt is calm, pleasantly confused during daytime. Accepting med and no aggressive behavior  7/4- no sig behavior issue overnight and sleep is improved. Accepting med. Pleasant confused and with memory impairment  7/5- sleep is poor and remains paranoid at night seeing dead people. Need assistance with ADLs. No behavior issue  7/6- disturbed sleep last nigh when code s was called due to pt not responding- later cancelled as pt's vital were within normal and cxr was wnl to rule out aspiration. Pt is currently in bed responding to command and appear to be in no distress. Poor memory and attention  7/7-Pt was seen in her room with the RN. Presents alert, verbal, oriented to self only. Affect is flat. States she is feeling fine. Prn's used-Ambien 5 mg. No AVH or SI/HI. 7/8- Pt is incontinent of bowel and bladder, becomes irritable during ADL care. No significant changes from yesterday. Awaiting placement. 7/9- remains confused and disoriented to time and place. Pleasant and sleep is better. No behavior problem. Accepting med.    7/10- remains pleasant and confused with no behavior problem. Accepting med. Sleep is better. Poor memory and disoriented to time/place. 7/11- pleasant and co operative during the interview. She remains confused with poor memory. No delusional themes and sleep is improving.  7/12- pleasant and co operative. Remains disoriented, confused with poor memory. Slept well without prn med. No paranoid ideation  7/13- pleasant and confused. No behavior issue. Likes to color during group activities. Accepting med. Sleep is better  7/14- no behavior problem overnight. pleasant and with memory impairment. Need total care, bowel and bladder incontinent  7/15- no sig change in presentation, no behavior problem. Ready for placement. Memory impairment  7/16- poor memory, need total care, sleep is better and no behavior issue. 7/17- irritable when attended for ADLS/incontinence of bladder and bowel. Overall no physical aggression and pleasant and confuse. Accepting med  7/18- no sig behavior issue overnight. Accepting med and sleep is  Better. Pleasant and with memory impairment  7/19- stable presentation with brighter affect and memory impairment. Likes coloring . Sleep is erratic but appear rested. Accepting med. Need total care      SIDE EFFECTS: (reviewed/updated 7/19/2018)  None reported or admitted to.   No noted toxicity with use of current med   ALLERGIES:(reviewed/updated 7/19/2018)  Allergies   Allergen Reactions    Cephalosporins Itching    Codeine Hives    Erythromycin Hives    Nubain [Nalbuphine] Hives    Pcn [Penicillins] Hives    Percodan [Oxycodone Hcl-Oxycodone-Asa] Hives    Promethazine Hives    Protonix [Pantoprazole] Diarrhea    Sulfa (Sulfonamide Antibiotics) Hives    Toradol [Ketorolac] Hives    Ultram [Tramadol] Hives    Valium [Diazepam] Hives    Prilosec [Omeprazole] Itching      MEDICATIONS PRIOR TO ADMISSION:(reviewed/updated 7/19/2018)  Prescriptions Prior to Admission   Medication Sig    multivit-minerals/folic acid [facility] MULTIVITAMIN GUMMIES PO) Take 1 Tab by mouth daily.  QUEtiapine (SEROQUEL) 25 mg tablet Take 12.5 mg by mouth daily.  carvedilol (COREG) 3.125 mg tablet Take 3.125 mg by mouth two (2) times daily (with meals).  diclofenac (VOLTAREN) 1 % gel Apply 2 g to affected area every six (6) hours as needed (arthritis pain).  acetaminophen (TYLENOL) 325 mg tablet Take 650 mg by mouth every eight (8) hours as needed for Pain.  Menthol (ASPERCREME HEAT) 10 % gel by Apply Externally route every eight (8) hours as needed (arthritis).  aspirin 81 mg chewable tablet Take 81 mg by mouth daily. Indications: myocardial infarction prevention    dilTIAZem XR (DILACOR XR) 120 mg XR capsule Take 120 mg by mouth daily. Indications: hypertension, VENTRICULAR RATE CONTROL IN ATRIAL FIBRILLATION    furosemide (LASIX) 20 mg tablet Take 20 mg by mouth daily as needed. Indications: Edema, hypertension    loratadine (CLARITIN) 10 mg tablet Take 10 mg by mouth daily as needed. Indications: Allergic Rhinitis, SNEEZING    famotidine (PEPCID) 20 mg tablet Take 20 mg by mouth daily. Indications: Heartburn    QUEtiapine (SEROQUEL) 25 mg tablet Take 25 mg by mouth nightly. Indications: Generalized Anxiety Disorder      PAST MEDICAL HISTORY: Past medical history from the initial psychiatric evaluation has been reviewed (reviewed/updated 7/19/2018) with no additional updates (I asked patient and no additional past medical history provided). No past medical history on file. No past surgical history on file. SOCIAL HISTORY: Social history from the initial psychiatric evaluation has been reviewed (reviewed/updated 7/19/2018) with no additional updates (I asked patient and no additional social history provided). Social History     Social History    Marital status: SINGLE     Spouse name: N/A    Number of children: N/A    Years of education: N/A     Occupational History    Not on file.      Social History Main Topics    Smoking status: Not on file    Smokeless tobacco: Not on file    Alcohol use Not on file    Drug use: Not on file    Sexual activity: Not on file     Other Topics Concern    Not on file     Social History Narrative      FAMILY HISTORY: Family history from the initial psychiatric evaluation has been reviewed (reviewed/updated 7/19/2018) with no additional updates (I asked patient and no additional family history provided). No family history on file. REVIEW OF SYSTEMS: (reviewed/updated 7/19/2018)  Appetite:poor   Sleep: decreased more than normal   All other Review of Systems: Psychological ROS: positive for - behavioral disorder, concentration difficulties, disorientation and memory difficulties         [facility] (MSE):    MSE FINDINGS ARE WITHIN NORMAL LIMITS (WNL) UNLESS OTHERWISE STATED BELOW. ( ALL OF THE BELOW CATEGORIES OF THE MSE HAVE BEEN REVIEWED (reviewed 7/19/2018) AND UPDATED AS DEEMED APPROPRIATE )  General Presentation age appropriate and casually dressed, unreliable and vague   Orientation disorganized   Vital Signs  See below (reviewed 7/19/2018); Vital Signs (BP, Pulse, & Temp) are within normal limits if not listed below.    Gait and Station Stable/steady, no ataxia   Musculoskeletal System No extrapyramidal symptoms (EPS); no abnormal muscular movements or Tardive Dyskinesia (TD); muscle strength and tone are within normal limits   Language No aphasia or dysarthria   Speech:  hypoverbal   Thought Processes illogical; slow rate of thoughts; poor abstract reasoning/computation   Thought Associations tangential   Thought Content free of delusions, free of hallucinations and preoccupations   Suicidal Ideations none   Homicidal Ideations none   Mood:  Less anxious   Affect:  anxious and increased in intensity   Memory recent  impaired   Memory remote:  impaired   Concentration/Attention:  distractable   Fund of Knowledge below average   Insight:  poor Reliability poor   Judgment:  poor          VITALS:     Patient Vitals for the past 24 hrs:   Temp Pulse Resp BP SpO2   07/19/18 1040 - - 16 119/80 -   07/19/18 0654 - (!) 58 16 120/55 -   07/18/18 2008 97.5 °F (36.4 °C) (!) 55 16 140/65 97 %   07/18/18 1846 - (!) 59 - 137/58 -     Wt Readings from Last 3 Encounters:   07/17/18 54.9 kg (121 lb)     Temp Readings from Last 3 Encounters:   06/17/18 97.6 °F (36.4 °C)   06/15/18 97.6 °F (36.4 °C)     BP Readings from Last 3 Encounters:   07/19/18 119/80   06/17/18 102/47   06/16/18 99/55     Pulse Readings from Last 3 Encounters:   07/19/18 (!) 58   06/17/18 70   06/16/18 81            DATA     LABORATORY DATA:(reviewed/updated 7/19/2018)  No results found for this or any previous visit (from the past 24 hour(s)). No results found for: VALF2, VALAC, VALP, VALPR, DS6, CRBAM, CRBAMP, CARB2, XCRBAM  No results found for: LITHM   RADIOLOGY REPORTS:(reviewed/updated 7/19/2018)  Xr Chest Port    Result Date: 6/17/2018  EXAM:  XR CHEST PORT INDICATION:  Chest Pain COMPARISON:  None. FINDINGS: A portable AP radiograph of the chest was obtained at 720 hours. The patient is on a cardiac monitor. There is minor right basilar atelectasis. There is question of a vague right perihilar airspace process. There is slight diffuse interstitial prominence. Heart size is borderline enlarged. Patient status post median sternotomy. IMPRESSION: 1. There is suggestion of a vague right perihilar airspace process and follow-up studies would be helpful to assess for interval change. There is minor right basilar atelectasis.           MEDICATIONS     ALL MEDICATIONS:   Current Facility-Administered Medications   Medication Dose Route Frequency    dilTIAZem CD (CARDIZEM CD) capsule 120 mg  120 mg Oral DAILY    risperiDONE (RisperDAL) 1 mg/mL oral solution soln 1 mg  1 mg Oral QHS    memantine (NAMENDA) tablet 10 mg  10 mg Oral BID    ziprasidone (GEODON) 10 mg in sterile water (preservative free) 0.5 mL injection  10 mg IntraMUSCular BID PRN    benztropine (COGENTIN) tablet 1 mg  1 mg Oral BID PRN    benztropine (COGENTIN) injection 1 mg  1 mg IntraMUSCular BID PRN    acetaminophen (TYLENOL) tablet 650 mg  650 mg Oral Q4H PRN    magnesium hydroxide (MILK OF MAGNESIA) 400 mg/5 mL oral suspension 30 mL  30 mL Oral DAILY PRN    nicotine (NICODERM CQ) 21 mg/24 hr patch 1 Patch  1 Patch TransDERmal DAILY PRN    aspirin chewable tablet 81 mg  81 mg Oral DAILY    famotidine (PEPCID) tablet 20 mg  20 mg Oral DAILY    carvedilol (COREG) tablet 3.125 mg  3.125 mg Oral BID WITH MEALS      SCHEDULED MEDICATIONS:   Current Facility-Administered Medications   Medication Dose Route Frequency    dilTIAZem CD (CARDIZEM CD) capsule 120 mg  120 mg Oral DAILY    risperiDONE (RisperDAL) 1 mg/mL oral solution soln 1 mg  1 mg Oral QHS    memantine (NAMENDA) tablet 10 mg  10 mg Oral BID    aspirin chewable tablet 81 mg  81 mg Oral DAILY    famotidine (PEPCID) tablet 20 mg  20 mg Oral DAILY    carvedilol (COREG) tablet 3.125 mg  3.125 mg Oral BID WITH MEALS          ASSESSMENT & PLAN     DIAGNOSES REQUIRING ACTIVE TREATMENT AND MONITORING: (reviewed/updated 7/19/2018)  Patient Active Hospital Problem List:   Dementia (6/17/2018)    Assessment: moderate to severe- disoriented, poor memory and irritable. Need total care. No behavior problem and accepting med    Plan:  [facility]. Risperdal    Hypotension  A: low bp  P:ct with current tx-IM to follow    7/19- no change-  waiting for placement- ct with current tx plan-  Evaluated by IM for HTN- on med- stable    In summary, Nisreen Velasquez, is a 66 y.o.  female who presents with a severe exacerbation of the principal diagnosis of Dementia  Patient's condition is /not stable . Patient requires continued inpatient hospitalization for further stabilization, safety monitoring and medication management.   I will continue to coordinate the provision of individual, milieu, occupational, group, and substance abuse therapies to address target symptoms/diagnoses as deemed appropriate for the individual patient. A coordinated, multidisplinary treatment team round was conducted with the patient (this team consists of the nurse, psychiatric unit pharmcist,  and writer). Complete current electronic health record for patient has been reviewed today including consultant notes, ancillary staff notes, nurses and psychiatric tech notes. Suicide risk assessment completed and patient deemed to be of low risk for suicide at this time. The following regarding medications was addressed during rounds with patient:   the risks and benefits of the proposed medication. The patient was given the opportunity to ask questions. Informed consent given to the use of the above medications. Will continue to adjust psychiatric and non-psychiatric medications (see above \"medication\" section and orders section for details) as deemed appropriate & based upon diagnoses and response to treatment. I will continue to order blood tests/labs and diagnostic tests as deemed appropriate and review results as they become available (see orders for details and above listed lab/test results). I will order psychiatric records from previous [facility] hospitals to further elucidate the nature of patient's psychopathology and review once available. I will gather additional collateral information from friends, family and o/p treatment team to further elucidate the nature of patient's psychopathology and baselline level of psychiatric functioning.          I certify that this patient's inpatient psychiatric hospital services furnished since the previous certification were, and continue to be, required for treatment that could reasonably be expected to improve the patient's condition, or for diagnostic study, and that the patient continues to need, on a daily basis, active treatment furnished directly by or requiring the supervision of inpatient psychiatric facility personnel. In addition, the hospital records show that services furnished were intensive treatment services, admission or related services, or equivalent services.     EXPECTED DISCHARGE DATE/DAY: TBD     DISPOSITION: Home/[facility]       Signed By:   Tony Mcleod MD  7/19/2018

## 2018-07-19 NOTE — BH NOTES
PSYCHIATRIC PROGRESS NOTE         Patient Name  Darshana Santiago   Date of Birth 1940   [facility] [id number]   Medical Record Number  [id number]      Age  66 y.o. PCP Berenice Spears, MD   Admit date:  6/17/2018    Room Number  321/01  @ [facility]   Date of Service  7/18/2018           E & M PROGRESS NOTE:         HISTORY       CC:  \"confused, memory impairment\"  HISTORY OF PRESENT ILLNESS/INTERVAL HISTORY:  (reviewed/updated 7/18/2018). per initial evaluation: The patient, Darshana Santiago, is a 68 y.o. WHITE [place]  female with a past psychiatric history significant for dementia, who presents at this time with complaints of (and/or evidence of) the following emotional symptoms: memory impairment. Additional symptomatology include disoriented to time and place, poor memory and behavior issues. pt has been incontent and need assistance with ADLs. She is irritable and easily gets loud and labile in mood. She was dc to medical unit for tx of hypotension and now back on U for further tx  The above symptoms have been present for years. These symptoms are of severe severity. These symptoms are constant  in nature. Darshana Santiago presents/reports/evidences the following emotional symptoms today, 7/18/2018:memory impairment. The above symptoms have been present for years. These symptoms are of moderate severity. The symptoms are constant  in nature. Additional symptomatology and features include confused , disoriented to place and time. Pt is now accepting her med and need assistance with ADLs due to incontinence. No aggressive behavior and affect is bright , sleep is improving, pleasant and co operative. Received no prn.  6/25/18 she is compliant with medications and no anger issues like before and she gets confused and disoriented   6/26/18 she is doing better and mood is better and no anger issues and no SI or HI and slept well last night  6/27/18 she is the same and she is not engaging well and she has no anger issues and slept fairly last night but she was sleepy this morning and not engaging    6/28- remains confused with memory impairment. Pt has been hallucinating and paranoid- hiding behind her bed stating a women has been raped and stabbed. Received prn med. Sleep is better  6/29- episode of paranoid and mostly sun downing. Appear pleasant in the morning and no recollection of night time paranoia. unsteady gait due to sedation  6/30- pleasant and confused during the daytime, eating her breakfast. Still has paranoid behavior at night  7/1- slow progress as less sundowning and less paranoid. Pleasantly confused during the day time with no sig behavior problem  7/2- confused and disoriented with poor memory. Sun downing and worse in the evening with paranoia. 7/3- paranoid at night about a man coming to her room and killing. Fearful but pt is calm, pleasantly confused during daytime. Accepting med and no aggressive behavior  7/4- no sig behavior issue overnight and sleep is improved. Accepting med. Pleasant confused and with memory impairment  7/5- sleep is poor and remains paranoid at night seeing dead people. Need assistance with ADLs. No behavior issue  7/6- disturbed sleep last nigh when code s was called due to pt not responding- later cancelled as pt's vital were within normal and cxr was wnl to rule out aspiration. Pt is currently in bed responding to command and appear to be in no distress. Poor memory and attention  7/7-Pt was seen in her room with the RN. Presents alert, verbal, oriented to self only. Affect is flat. States she is feeling fine. Prn's used-Ambien 5 mg. No AVH or SI/HI. 7/8- Pt is incontinent of bowel and bladder, becomes irritable during ADL care. No significant changes from yesterday. Awaiting placement. 7/9- remains confused and disoriented to time and place. Pleasant and sleep is better. No behavior problem. Accepting med.    7/10- remains pleasant and confused with no behavior problem. Accepting med. Sleep is better. Poor memory and disoriented to time/place. 7/11- pleasant and co operative during the interview. She remains confused with poor memory. No delusional themes and sleep is improving.  7/12- pleasant and co operative. Remains disoriented, confused with poor memory. Slept well without prn med. No paranoid ideation  7/13- pleasant and confused. No behavior issue. Likes to color during group activities. Accepting med. Sleep is better  7/14- no behavior problem overnight. pleasant and with memory impairment. Need total care, bowel and bladder incontinent  7/15- no sig change in presentation, no behavior problem. Ready for placement. Memory impairment  7/16- poor memory, need total care, sleep is better and no behavior issue. 7/17- irritable when attended for ADLS/incontinence of bladder and bowel. Overall no physical aggression and pleasant and confuse. Accepting med  7/18- no sig behavior issue overnight. Accepting med and sleep is  Better. Pleasant and with memory impairmant      SIDE EFFECTS: (reviewed/updated 7/18/2018)  None reported or admitted to. No noted toxicity with use of current med   ALLERGIES:(reviewed/updated 7/18/2018)  Allergies   Allergen Reactions    Cephalosporins Itching    Codeine Hives    Erythromycin Hives    Nubain [Nalbuphine] Hives    Pcn [Penicillins] Hives    Percodan [Oxycodone Hcl-Oxycodone-Asa] Hives    Promethazine Hives    Protonix [Pantoprazole] Diarrhea    Sulfa (Sulfonamide Antibiotics) Hives    Toradol [Ketorolac] Hives    Ultram [Tramadol] Hives    Valium [Diazepam] Hives    Prilosec [Omeprazole] Itching      MEDICATIONS PRIOR TO ADMISSION:(reviewed/updated 7/18/2018)  Prescriptions Prior to Admission   Medication Sig    multivit-minerals/folic acid (WOMEN'S MULTIVITAMIN GUMMIES PO) Take 1 Tab by mouth daily.  QUEtiapine (SEROQUEL) 25 mg tablet Take 12.5 mg by mouth daily.     carvedilol (COREG) 3.125 mg tablet Take 3.125 mg by mouth two (2) times daily (with meals).  diclofenac (VOLTAREN) 1 % gel Apply 2 g to affected area every six (6) hours as needed (arthritis pain).  acetaminophen (TYLENOL) 325 mg tablet Take 650 mg by mouth every eight (8) hours as needed for Pain.  Menthol (ASPERCREME HEAT) 10 % gel by Apply Externally route every eight (8) hours as needed (arthritis).  aspirin 81 mg chewable tablet Take 81 mg by mouth daily. Indications: myocardial infarction prevention    dilTIAZem XR (DILACOR XR) 120 mg XR capsule Take 120 mg by mouth daily. Indications: hypertension, VENTRICULAR RATE CONTROL IN ATRIAL FIBRILLATION    furosemide (LASIX) 20 mg tablet Take 20 mg by mouth daily as needed. Indications: Edema, hypertension    loratadine (CLARITIN) 10 mg tablet Take 10 mg by mouth daily as needed. Indications: Allergic Rhinitis, SNEEZING    famotidine (PEPCID) 20 mg tablet Take 20 mg by mouth daily. Indications: Heartburn    QUEtiapine (SEROQUEL) 25 mg tablet Take 25 mg by mouth nightly. Indications: Generalized Anxiety Disorder      PAST MEDICAL HISTORY: Past medical history from the initial psychiatric evaluation has been reviewed (reviewed/updated 7/18/2018) with no additional updates (I asked patient and no additional past medical history provided). No past medical history on file. No past surgical history on file. SOCIAL HISTORY: Social history from the initial psychiatric evaluation has been reviewed (reviewed/updated 7/18/2018) with no additional updates (I asked patient and no additional social history provided). Social History     Social History    Marital status: SINGLE     Spouse name: N/A    Number of children: N/A    Years of education: N/A     Occupational History    Not on file.      Social History Main Topics    Smoking status: Not on file    Smokeless tobacco: Not on file    Alcohol use Not on file    Drug use: Not on file    Sexual activity: Not on file     Other Topics Concern    Not on file     Social History Narrative      FAMILY HISTORY: Family history from the initial psychiatric evaluation has been reviewed (reviewed/updated 7/18/2018) with no additional updates (I asked patient and no additional family history provided). No family history on file. REVIEW OF SYSTEMS: (reviewed/updated 7/18/2018)  Appetite:poor   Sleep: decreased more than normal   All other Review of Systems: Psychological ROS: positive for - behavioral disorder, concentration difficulties, disorientation and memory difficulties         [address] [facility] (MSE):    MSE FINDINGS ARE WITHIN NORMAL LIMITS (WNL) UNLESS OTHERWISE STATED BELOW. ( ALL OF THE BELOW CATEGORIES OF THE MSE HAVE BEEN REVIEWED (reviewed 7/18/2018) AND UPDATED AS DEEMED APPROPRIATE )  General Presentation age appropriate and casually dressed, unreliable and vague   Orientation disorganized   Vital Signs  See below (reviewed 7/18/2018); Vital Signs (BP, Pulse, & Temp) are within normal limits if not listed below.    Gait and Station Stable/steady, no ataxia   Musculoskeletal System No extrapyramidal symptoms (EPS); no abnormal muscular movements or Tardive Dyskinesia (TD); muscle strength and tone are within normal limits   Language No aphasia or dysarthria   Speech:  hypoverbal   Thought Processes illogical; slow rate of thoughts; poor abstract reasoning/computation   Thought Associations tangential   Thought Content free of delusions, free of hallucinations and preoccupations   Suicidal Ideations none   Homicidal Ideations none   Mood:  Less anxious   Affect:  anxious and increased in intensity   Memory recent  impaired   Memory remote:  impaired   Concentration/Attention:  distractable   Fund of Knowledge below average   Insight:  poor   Reliability poor   Judgment:  poor          VITALS:     Patient Vitals for the past 24 hrs:   Temp Pulse Resp BP SpO2   07/18/18 2008 97.5 °F (36.4 °C) (!) 55 16 140/65 97 %   07/18/18 1846 - (!) 59 - 137/58 -   07/18/18 0645 97 °F (36.1 °C) (!) 59 18 146/60 -     Wt Readings from Last 3 Encounters:   07/17/18 54.9 kg (121 lb)     Temp Readings from Last 3 Encounters:   07/18/18 97.5 °F (36.4 °C)   06/17/18 97.6 °F (36.4 °C)   06/15/18 97.6 °F (36.4 °C)     BP Readings from Last 3 Encounters:   07/18/18 140/65   06/17/18 102/47   06/16/18 99/55     Pulse Readings from Last 3 Encounters:   07/18/18 (!) 55   06/17/18 70   06/16/18 81            DATA     LABORATORY DATA:(reviewed/updated 7/18/2018)  No results found for this or any previous visit (from the past 24 hour(s)). No results found for: VALF2, VALAC, VALP, VALPR, DS6, CRBAM, CRBAMP, CARB2, XCRBAM  No results found for: LITHM   RADIOLOGY REPORTS:(reviewed/updated 7/18/2018)  Xr Chest Port    Result Date: 6/17/2018  EXAM:  XR CHEST PORT INDICATION:  Chest Pain COMPARISON:  None. FINDINGS: A portable AP radiograph of the chest was obtained at 720 hours. The patient is on a cardiac monitor. There is minor right basilar atelectasis. There is question of a vague right perihilar airspace process. There is slight diffuse interstitial prominence. Heart size is borderline enlarged. Patient status post median sternotomy. IMPRESSION: 1. There is suggestion of a vague right perihilar airspace process and follow-up studies would be helpful to assess for interval change. There is minor right basilar atelectasis.           MEDICATIONS     ALL MEDICATIONS:   Current Facility-Administered Medications   Medication Dose Route Frequency    [START ON 7/19/2018] dilTIAZem CD (CARDIZEM CD) capsule 120 mg  120 mg Oral DAILY    risperiDONE (RisperDAL) 1 mg/mL oral solution soln 1 mg  1 mg Oral QHS    memantine (NAMENDA) tablet 10 mg  10 mg Oral BID    ziprasidone (GEODON) 10 mg in sterile water (preservative free) 0.5 mL injection  10 mg IntraMUSCular BID PRN    benztropine (COGENTIN) tablet 1 mg  1 mg Oral BID PRN    benztropine (COGENTIN) injection 1 mg  1 mg IntraMUSCular BID PRN    acetaminophen (TYLENOL) tablet 650 mg  650 mg Oral Q4H PRN    magnesium hydroxide (MILK OF MAGNESIA) 400 mg/5 mL oral suspension 30 mL  30 mL Oral DAILY PRN    nicotine (NICODERM CQ) 21 mg/24 hr patch 1 Patch  1 Patch TransDERmal DAILY PRN    aspirin chewable tablet 81 mg  81 mg Oral DAILY    famotidine (PEPCID) tablet 20 mg  20 mg Oral DAILY    carvedilol (COREG) tablet 3.125 mg  3.125 mg Oral BID WITH MEALS      SCHEDULED MEDICATIONS:   Current Facility-Administered Medications   Medication Dose Route Frequency    [START ON 2018] dilTIAZem CD (CARDIZEM CD) capsule 120 mg  120 mg Oral DAILY    risperiDONE (RisperDAL) 1 mg/mL oral solution soln 1 mg  1 mg Oral QHS    memantine (NAMENDA) tablet 10 mg  10 mg Oral BID    aspirin chewable tablet 81 mg  81 mg Oral DAILY    famotidine (PEPCID) tablet 20 mg  20 mg Oral DAILY    carvedilol (COREG) tablet 3.125 mg  3.125 mg Oral BID WITH MEALS          ASSESSMENT & PLAN     DIAGNOSES REQUIRING ACTIVE TREATMENT AND MONITORING: (reviewed/updated 2018)  Patient Active Hospital Problem List:   Dementia (2018)    Assessment: moderate to severe- disoriented, poor memory and irritable. Need total care. No behavior problem and accepting med    Plan:  Radha Hernandez. Risperdal    Hypotension  A: low bp  P:ct with current tx-IM to follow    - no change-  waiting for placement- ct with current tx plan-behavior plan when attending to ADLs  Evaluated by IM for HTN- on med    In summary, Jose Alfredo Dobbins, is a 66 y.o.  female who presents with a severe exacerbation of the principal diagnosis of Dementia  Patient's condition is /not stable . Patient requires continued inpatient hospitalization for further stabilization, safety monitoring and medication management.   I will continue to coordinate the provision of individual, milieu, occupational, group, and substance abuse therapies to address target symptoms/diagnoses as deemed appropriate for the individual patient. A coordinated, multidisplinary treatment team round was conducted with the patient (this team consists of the nurse, psychiatric unit pharmcist,  and writer). Complete current electronic health record for patient has been reviewed today including consultant notes, ancillary staff notes, nurses and psychiatric tech notes. Suicide risk assessment completed and patient deemed to be of low risk for suicide at this time. The following regarding medications was addressed during rounds with patient:   the risks and benefits of the proposed medication. The patient was given the opportunity to ask questions. Informed consent given to the use of the above medications. Will continue to adjust psychiatric and non-psychiatric medications (see above \"medication\" section and orders section for details) as deemed appropriate & based upon diagnoses and response to treatment. I will continue to order blood tests/labs and diagnostic tests as deemed appropriate and review results as they become available (see orders for details and above listed lab/test results). I will order psychiatric records from previous [facility] hospitals to further elucidate the nature of patient's psychopathology and review once available. I will gather additional collateral information from friends, family and o/p treatment team to further elucidate the nature of patient's psychopathology and baselline level of psychiatric functioning.          I certify that this patient's inpatient psychiatric hospital services furnished since the previous certification were, and continue to be, required for treatment that could reasonably be expected to improve the patient's condition, or for diagnostic study, and that the patient continues to need, on a daily basis, active treatment furnished directly by or requiring the supervision of inpatient psychiatric facility personnel. In addition, the hospital records show that services furnished were intensive treatment services, admission or related services, or equivalent services.     EXPECTED DISCHARGE DATE/DAY: TBD     DISPOSITION: Home/assisted       Signed By:   Elisabeth Yepez MD  7/18/2018

## 2018-07-19 NOTE — PROGRESS NOTES
GROUP THERAPY PROGRESS NOTE      Earnstine Charisse was not present for medication group.       [address]  Student, Class of 1877

## 2018-07-19 NOTE — PROGRESS NOTES
Problem: Dementia-BEHAVIORAL HEALTH (Adult/Pediatric)  Goal: *STG: Remains safe in hospital  Outcome: Progressing Towards Goal  Pt alert, oriented to self on the unit. Pt visible in the potter and dayroom. Pt calm and cooperative, mood depressed with flat affect. VSS. No medical/beh concerns reported. Staff will continue to monitor for health and safety.

## 2018-07-19 NOTE — BH NOTES
Pt is oriented only to self.  Pt is attending groups and meal compliant.  Pt spends time in milieu with staff and peers - was coloring cat pictures and watching tv. No behavior issues.  resent pt's updated MAR and nursing notes to [facility] who do not currently have openings and she remains on wait list.    is awaiting a call back from [facility], Maddie Harris and [facility]enport are still reviewing pt's file. Pt's family has been updated and encouraged to look into options and be open to looking further out of area as a temporary placement while on wait list to facilities in her home town area.

## 2018-07-20 PROCEDURE — 74011250637 HC RX REV CODE- 250/637: Performed by: INTERNAL MEDICINE

## 2018-07-20 PROCEDURE — 74011250637 HC RX REV CODE- 250/637: Performed by: PSYCHIATRY & NEUROLOGY

## 2018-07-20 PROCEDURE — 65220000003 HC RM SEMIPRIVATE PSYCH

## 2018-07-20 RX ADMIN — CARVEDILOL 3.12 MG: 3.12 TABLET, FILM COATED ORAL at 08:52

## 2018-07-20 RX ADMIN — MEMANTINE 10 MG: 10 TABLET ORAL at 08:52

## 2018-07-20 RX ADMIN — Medication 1 MG: at 21:39

## 2018-07-20 RX ADMIN — MEMANTINE 10 MG: 10 TABLET ORAL at 18:20

## 2018-07-20 RX ADMIN — DILTIAZEM HYDROCHLORIDE 120 MG: 120 CAPSULE, COATED, EXTENDED RELEASE ORAL at 08:52

## 2018-07-20 RX ADMIN — CARVEDILOL 3.12 MG: 3.12 TABLET, FILM COATED ORAL at 18:20

## 2018-07-20 RX ADMIN — FAMOTIDINE 20 MG: 20 TABLET ORAL at 08:52

## 2018-07-20 RX ADMIN — ASPIRIN 81 MG 81 MG: 81 TABLET ORAL at 08:52

## 2018-07-20 NOTE — BH NOTES
PSYCHIATRIC PROGRESS NOTE         Patient Name  Ellen Arana   Date of Birth 1940   [facility] [id number]   Medical Record Number  [id number]      Age  66 y.o. PCP Berenice Spears, MD   Admit date:  6/17/2018    Room Number  321/01  @ [facility]   Date of Service  7/20/2018           E & M PROGRESS NOTE:         HISTORY       CC:  \"confused, memory impairment\"  HISTORY OF PRESENT ILLNESS/INTERVAL HISTORY:  (reviewed/updated 7/20/2018). per initial evaluation: The patient, Ellen Arana, is a 68 y.o. WHITE [place]  female with a past psychiatric history significant for dementia, who presents at this time with complaints of (and/or evidence of) the following emotional symptoms: memory impairment. Additional symptomatology include disoriented to time and place, poor memory and behavior issues. pt has been incontent and need assistance with ADLs. She is irritable and easily gets loud and labile in mood. She was dc to medical unit for tx of hypotension and now back on U for further tx  The above symptoms have been present for years. These symptoms are of severe severity. These symptoms are constant  in nature. Ellen Arana presents/reports/evidences the following emotional symptoms today, 7/20/2018:memory impairment. The above symptoms have been present for years. These symptoms are of moderate severity. The symptoms are constant  in nature. Additional symptomatology and features include confused , disoriented to place and time. Pt is now accepting her med and need assistance with ADLs due to incontinence. No aggressive behavior and affect is bright , sleep is improving, pleasant and co operative. Received no prn.  6/25/18 she is compliant with medications and no anger issues like before and she gets confused and disoriented   6/26/18 she is doing better and mood is better and no anger issues and no SI or HI and slept well last night  6/27/18 she is the same and she is not engaging well and she has no anger issues and slept fairly last night but she was sleepy this morning and not engaging    6/28- remains confused with memory impairment. Pt has been hallucinating and paranoid- hiding behind her bed stating a women has been raped and stabbed. Received prn med. Sleep is better  6/29- episode of paranoid and mostly sun downing. Appear pleasant in the morning and no recollection of night time paranoia. unsteady gait due to sedation  6/30- pleasant and confused during the daytime, eating her breakfast. Still has paranoid behavior at night  7/1- slow progress as less sundowning and less paranoid. Pleasantly confused during the day time with no sig behavior problem  7/2- confused and disoriented with poor memory. Sun downing and worse in the evening with paranoia. 7/3- paranoid at night about a man coming to her room and killing. Fearful but pt is calm, pleasantly confused during daytime. Accepting med and no aggressive behavior  7/4- no sig behavior issue overnight and sleep is improved. Accepting med. Pleasant confused and with memory impairment  7/5- sleep is poor and remains paranoid at night seeing dead people. Need assistance with ADLs. No behavior issue  7/6- disturbed sleep last nigh when code s was called due to pt not responding- later cancelled as pt's vital were within normal and cxr was wnl to rule out aspiration. Pt is currently in bed responding to command and appear to be in no distress. Poor memory and attention  7/7-Pt was seen in her room with the RN. Presents alert, verbal, oriented to self only. Affect is flat. States she is feeling fine. Prn's used-Ambien 5 mg. No AVH or SI/HI. 7/8- Pt is incontinent of bowel and bladder, becomes irritable during ADL care. No significant changes from yesterday. Awaiting placement. 7/9- remains confused and disoriented to time and place. Pleasant and sleep is better. No behavior problem. Accepting med.    7/10- remains pleasant and confused with no behavior problem. Accepting med. Sleep is better. Poor memory and disoriented to time/place. 7/11- pleasant and co operative during the interview. She remains confused with poor memory. No delusional themes and sleep is improving.  7/12- pleasant and co operative. Remains disoriented, confused with poor memory. Slept well without prn med. No paranoid ideation  7/13- pleasant and confused. No behavior issue. Likes to color during group activities. Accepting med. Sleep is better  7/14- no behavior problem overnight. pleasant and with memory impairment. Need total care, bowel and bladder incontinent  7/15- no sig change in presentation, no behavior problem. Ready for placement. Memory impairment  7/16- poor memory, need total care, sleep is better and no behavior issue. 7/17- irritable when attended for ADLS/incontinence of bladder and bowel. Overall no physical aggression and pleasant and confuse. Accepting med  7/18- no sig behavior issue overnight. Accepting med and sleep is  Better. Pleasant and with memory impairment  7/19- stable presentation with brighter affect and memory impairment. Likes coloring . Sleep is erratic but appear rested. Accepting med. Need total care  7/20- no sig change in presentation. Remains confused with memory impairment. No behavior issue      SIDE EFFECTS: (reviewed/updated 7/20/2018)  None reported or admitted to.   No noted toxicity with use of current med   ALLERGIES:(reviewed/updated 7/20/2018)  Allergies   Allergen Reactions    Cephalosporins Itching    Codeine Hives    Erythromycin Hives    Nubain [Nalbuphine] Hives    Pcn [Penicillins] Hives    Percodan [Oxycodone Hcl-Oxycodone-Asa] Hives    Promethazine Hives    Protonix [Pantoprazole] Diarrhea    Sulfa (Sulfonamide Antibiotics) Hives    Toradol [Ketorolac] Hives    Ultram [Tramadol] Hives    Valium [Diazepam] Hives    Prilosec [Omeprazole] Itching      MEDICATIONS PRIOR TO ADMISSION:(reviewed/updated 7/20/2018)  Prescriptions Prior to Admission   Medication Sig    multivit-minerals/folic acid (WOMEN'S MULTIVITAMIN GUMMIES PO) Take 1 Tab by mouth daily.  QUEtiapine (SEROQUEL) 25 mg tablet Take 12.5 mg by mouth daily.  carvedilol (COREG) 3.125 mg tablet Take 3.125 mg by mouth two (2) times daily (with meals).  diclofenac (VOLTAREN) 1 % gel Apply 2 g to affected area every six (6) hours as needed (arthritis pain).  acetaminophen (TYLENOL) 325 mg tablet Take 650 mg by mouth every eight (8) hours as needed for Pain.  Menthol (ASPERCREME HEAT) 10 % gel by Apply Externally route every eight (8) hours as needed (arthritis).  aspirin 81 mg chewable tablet Take 81 mg by mouth daily. Indications: myocardial infarction prevention    dilTIAZem XR (DILACOR XR) 120 mg XR capsule Take 120 mg by mouth daily. Indications: hypertension, VENTRICULAR RATE CONTROL IN ATRIAL FIBRILLATION    furosemide (LASIX) 20 mg tablet Take 20 mg by mouth daily as needed. Indications: Edema, hypertension    loratadine (CLARITIN) 10 mg tablet Take 10 mg by mouth daily as needed. Indications: Allergic Rhinitis, SNEEZING    famotidine (PEPCID) 20 mg tablet Take 20 mg by mouth daily. Indications: Heartburn    QUEtiapine (SEROQUEL) 25 mg tablet Take 25 mg by mouth nightly. Indications: Generalized Anxiety Disorder      PAST MEDICAL HISTORY: Past medical history from the initial psychiatric evaluation has been reviewed (reviewed/updated 7/20/2018) with no additional updates (I asked patient and no additional past medical history provided). No past medical history on file. No past surgical history on file. SOCIAL HISTORY: Social history from the initial psychiatric evaluation has been reviewed (reviewed/updated 7/20/2018) with no additional updates (I asked patient and no additional social history provided).    Social History     Social History    Marital status: SINGLE     Spouse name: N/A    Number of children: N/A    Years of education: N/A     Occupational History    Not on file. Social History Main Topics    Smoking status: Not on file    Smokeless tobacco: Not on file    Alcohol use Not on file    Drug use: Not on file    Sexual activity: Not on file     Other Topics Concern    Not on file     Social History Narrative      FAMILY HISTORY: Family history from the initial psychiatric evaluation has been reviewed (reviewed/updated 7/20/2018) with no additional updates (I asked patient and no additional family history provided). No family history on file. REVIEW OF SYSTEMS: (reviewed/updated 7/20/2018)  Appetite:poor   Sleep: decreased more than normal   All other Review of Systems: Psychological ROS: positive for - behavioral disorder, concentration difficulties, disorientation and memory difficulties         [facility] (MSE):    MSE FINDINGS ARE WITHIN NORMAL LIMITS (WNL) UNLESS OTHERWISE STATED BELOW. ( ALL OF THE BELOW CATEGORIES OF THE MSE HAVE BEEN REVIEWED (reviewed 7/20/2018) AND UPDATED AS DEEMED APPROPRIATE )  General Presentation age appropriate and casually dressed, unreliable and vague   Orientation disorganized   Vital Signs  See below (reviewed 7/20/2018); Vital Signs (BP, Pulse, & Temp) are within normal limits if not listed below.    Gait and Station Stable/steady, no ataxia   Musculoskeletal System No extrapyramidal symptoms (EPS); no abnormal muscular movements or Tardive Dyskinesia (TD); muscle strength and tone are within normal limits   Language No aphasia or dysarthria   Speech:  hypoverbal   Thought Processes illogical; slow rate of thoughts; poor abstract reasoning/computation   Thought Associations tangential   Thought Content free of delusions, free of hallucinations and preoccupations   Suicidal Ideations none   Homicidal Ideations none   Mood:  Less anxious   Affect:  anxious and increased in intensity   Memory recent  impaired   Memory remote:  impaired Concentration/Attention:  distractable   Fund of Knowledge below average   Insight:  poor   Reliability poor   Judgment:  poor          VITALS:     Patient Vitals for the past 24 hrs:   Pulse Resp BP SpO2   07/20/18 0852 66 - 122/53 -   07/19/18 1842 66 16 111/54 100 %     Wt Readings from Last 3 Encounters:   07/17/18 54.9 kg (121 lb)     Temp Readings from Last 3 Encounters:   06/17/18 97.6 °F (36.4 °C)   06/15/18 97.6 °F (36.4 °C)     BP Readings from Last 3 Encounters:   07/20/18 122/53   06/17/18 102/47   06/16/18 99/55     Pulse Readings from Last 3 Encounters:   07/20/18 66   06/17/18 70   06/16/18 81            DATA     LABORATORY DATA:(reviewed/updated 7/20/2018)  No results found for this or any previous visit (from the past 24 hour(s)). No results found for: VALF2, VALAC, VALP, VALPR, DS6, CRBAM, CRBAMP, CARB2, XCRBAM  No results found for: LITHM   RADIOLOGY REPORTS:(reviewed/updated 7/20/2018)  Xr Chest Port    Result Date: 6/17/2018  EXAM:  XR CHEST PORT INDICATION:  Chest Pain COMPARISON:  None. FINDINGS: A portable AP radiograph of the chest was obtained at 720 hours. The patient is on a cardiac monitor. There is minor right basilar atelectasis. There is question of a vague right perihilar airspace process. There is slight diffuse interstitial prominence. Heart size is borderline enlarged. Patient status post median sternotomy. IMPRESSION: 1. There is suggestion of a vague right perihilar airspace process and follow-up studies would be helpful to assess for interval change. There is minor right basilar atelectasis.           MEDICATIONS     ALL MEDICATIONS:   Current Facility-Administered Medications   Medication Dose Route Frequency    dilTIAZem CD (CARDIZEM CD) capsule 120 mg  120 mg Oral DAILY    risperiDONE (RisperDAL) 1 mg/mL oral solution soln 1 mg  1 mg Oral QHS    memantine (NAMENDA) tablet 10 mg  10 mg Oral BID    ziprasidone (GEODON) 10 mg in sterile water (preservative free) 0.5 mL injection  10 mg IntraMUSCular BID PRN    benztropine (COGENTIN) tablet 1 mg  1 mg Oral BID PRN    benztropine (COGENTIN) injection 1 mg  1 mg IntraMUSCular BID PRN    acetaminophen (TYLENOL) tablet 650 mg  650 mg Oral Q4H PRN    magnesium hydroxide (MILK OF MAGNESIA) 400 mg/5 mL oral suspension 30 mL  30 mL Oral DAILY PRN    nicotine (NICODERM CQ) 21 mg/24 hr patch 1 Patch  1 Patch TransDERmal DAILY PRN    aspirin chewable tablet 81 mg  81 mg Oral DAILY    famotidine (PEPCID) tablet 20 mg  20 mg Oral DAILY    carvedilol (COREG) tablet 3.125 mg  3.125 mg Oral BID WITH MEALS      SCHEDULED MEDICATIONS:   Current Facility-Administered Medications   Medication Dose Route Frequency    dilTIAZem CD (CARDIZEM CD) capsule 120 mg  120 mg Oral DAILY    risperiDONE (RisperDAL) 1 mg/mL oral solution soln 1 mg  1 mg Oral QHS    memantine (NAMENDA) tablet 10 mg  10 mg Oral BID    aspirin chewable tablet 81 mg  81 mg Oral DAILY    famotidine (PEPCID) tablet 20 mg  20 mg Oral DAILY    carvedilol (COREG) tablet 3.125 mg  3.125 mg Oral BID WITH MEALS          ASSESSMENT & PLAN     DIAGNOSES REQUIRING ACTIVE TREATMENT AND MONITORING: (reviewed/updated 7/20/2018)  Patient Active Hospital Problem List:   Dementia (6/17/2018)    Assessment: moderate to severe- disoriented, poor memory and irritable. Need total care. No behavior problem and accepting med    Plan:  Gabrielle Pong. Risperdal    Hypotension  A: low bp  P:ct with current tx-IM to follow    7/20- stable with no change-  waiting for placement- ct with current tx plan-  Evaluated by IM for HTN- on med- stable    In summary, Mariana De Santiago, is a 66 y.o.  female who presents with a severe exacerbation of the principal diagnosis of Dementia  Patient's condition is /not stable . Patient requires continued inpatient hospitalization for further stabilization, safety monitoring and medication management.   I will continue to coordinate the provision of individual, milieu, occupational, group, and substance abuse therapies to address target symptoms/diagnoses as deemed appropriate for the individual patient. A coordinated, multidisplinary treatment team round was conducted with the patient (this team consists of the nurse, psychiatric unit pharmcist,  and writer). Complete current electronic health record for patient has been reviewed today including consultant notes, ancillary staff notes, nurses and psychiatric tech notes. Suicide risk assessment completed and patient deemed to be of low risk for suicide at this time. The following regarding medications was addressed during rounds with patient:   the risks and benefits of the proposed medication. The patient was given the opportunity to ask questions. Informed consent given to the use of the above medications. Will continue to adjust psychiatric and non-psychiatric medications (see above \"medication\" section and orders section for details) as deemed appropriate & based upon diagnoses and response to treatment. I will continue to order blood tests/labs and diagnostic tests as deemed appropriate and review results as they become available (see orders for details and above listed lab/test results). I will order psychiatric records from previous [facility] hospitals to further elucidate the nature of patient's psychopathology and review once available. I will gather additional collateral information from friends, family and o/p treatment team to further elucidate the nature of patient's psychopathology and baselline level of psychiatric functioning.          I certify that this patient's inpatient psychiatric hospital services furnished since the previous certification were, and continue to be, required for treatment that could reasonably be expected to improve the patient's condition, or for diagnostic study, and that the patient continues to need, on a daily basis, active treatment furnished directly by or requiring the supervision of inpatient psychiatric facility personnel. In addition, the hospital records show that services furnished were intensive treatment services, admission or related services, or equivalent services.     EXPECTED DISCHARGE DATE/DAY: TBD     DISPOSITION: Home/skilled nursing       Signed By:   Didier Henson MD  7/20/2018

## 2018-07-20 NOTE — BH NOTES
GROUP THERAPY PROGRESS NOTE    The patient Lamin lew 66 y.o. female is participating in [address] [place] [address]. Group time: 45 minutes    Personal goal for participation:  To participate in relaxation activity    Goal orientation:  relaxation    Group therapy participation: minimal    Therapeutic interventions reviewed and discussed: favorite ways to relax    Impression of participation:  The patient was present-arrived late    Kate Ruvalcaba  7/20/2018  12:49 PM

## 2018-07-20 NOTE — PROGRESS NOTES
Problem: Dementia-BEHAVIORAL HEALTH (Adult/Pediatric)  Goal: *STG: Participates in treatment plan  Outcome: Progressing Towards Goal  Pt slept 6 hours. No concerns overnight. Staff will continue to monitor for health and safety.

## 2018-07-20 NOTE — PROGRESS NOTES
Patient alert and oriented to person only. Calm and cooperative with staff. Confused and disoriented to time/place/situation. Patient visible in milieu for meals and groups but keeps to self. Denies thoughts of harming self or others. Denies auditory, visual and tactile hallucinations. Patient incontinent and requires assistance with ADLs. Medication and meal compliant. Will continue to monitor and assess.

## 2018-07-20 NOTE — PROGRESS NOTES
Problem: Dementia-BEHAVIORAL HEALTH (Adult/Pediatric)  Goal: *STG: Remains safe in hospital  Outcome: Progressing Towards Goal  Pt alert and visible in the dayroom. Pt appears depressed with flat affect. Pt is meal and medication compliant. No med/beh concerns reported. Staff will continue to monitor for health and safety.

## 2018-07-21 PROCEDURE — 74011250637 HC RX REV CODE- 250/637: Performed by: INTERNAL MEDICINE

## 2018-07-21 PROCEDURE — 65220000003 HC RM SEMIPRIVATE PSYCH

## 2018-07-21 PROCEDURE — 74011250637 HC RX REV CODE- 250/637: Performed by: PSYCHIATRY & NEUROLOGY

## 2018-07-21 RX ADMIN — ASPIRIN 81 MG 81 MG: 81 TABLET ORAL at 09:23

## 2018-07-21 RX ADMIN — FAMOTIDINE 20 MG: 20 TABLET ORAL at 09:23

## 2018-07-21 RX ADMIN — CARVEDILOL 3.12 MG: 3.12 TABLET, FILM COATED ORAL at 09:23

## 2018-07-21 RX ADMIN — MEMANTINE 10 MG: 10 TABLET ORAL at 09:23

## 2018-07-21 RX ADMIN — DILTIAZEM HYDROCHLORIDE 120 MG: 120 CAPSULE, COATED, EXTENDED RELEASE ORAL at 09:23

## 2018-07-21 RX ADMIN — Medication 1 MG: at 21:01

## 2018-07-21 RX ADMIN — MEMANTINE 10 MG: 10 TABLET ORAL at 17:46

## 2018-07-21 RX ADMIN — CARVEDILOL 3.12 MG: 3.12 TABLET, FILM COATED ORAL at 17:46

## 2018-07-21 NOTE — BH NOTES
Problem: Depressed Mood (Adult/Pediatric)  Goal: *STG: Remains safe in hospital  Outcome: Progressing Towards Goal  Pt is mostly isolative from the milieu. Pt presents as quiet, isolative but cooperative. Pt is meds/meals compliant. Pt was present in her treatment team meeting today. Pt remains confused and disoriented due to dementia. Pt is incontinent. Pt has been helped with her ADL. Will continue to monitor q 15 for safety, mood and behavior changes.

## 2018-07-21 NOTE — BH NOTES
PSYCHIATRIC PROGRESS NOTE         Patient Name  Ellen Arana   Date of Birth 1940   [facility] [id number]   Medical Record Number  [id number]      Age  66 y.o. PCP Berenice Spears, MD   Admit date:  6/17/2018    Room Number  321/01  @ [facility]   Date of Service  7/21/2018           E & M PROGRESS NOTE:         HISTORY       CC:  \"confused, memory impairment\"  HISTORY OF PRESENT ILLNESS/INTERVAL HISTORY:  (reviewed/updated 7/21/2018). per initial evaluation: The patient, Ellen Arana, is a 68 y.o. WHITE [place]  female with a past psychiatric history significant for dementia, who presents at this time with complaints of (and/or evidence of) the following emotional symptoms: memory impairment. Additional symptomatology include disoriented to time and place, poor memory and behavior issues. pt has been incontent and need assistance with ADLs. She is irritable and easily gets loud and labile in mood. She was dc to medical unit for tx of hypotension and now back on U for further tx  The above symptoms have been present for years. These symptoms are of severe severity. These symptoms are constant  in nature. Ellen Arana presents/reports/evidences the following emotional symptoms today, 7/21/2018:memory impairment. The above symptoms have been present for years. These symptoms are of moderate severity. The symptoms are constant  in nature. Additional symptomatology and features include confused , disoriented to place and time. Pt is now accepting her med and need assistance with ADLs due to incontinence. No aggressive behavior and affect is bright , sleep is improving, pleasant and co operative. Received no prn.  6/25/18 she is compliant with medications and no anger issues like before and she gets confused and disoriented   6/26/18 she is doing better and mood is better and no anger issues and no SI or HI and slept well last night  6/27/18 she is the same and she is not engaging well and she has no anger issues and slept fairly last night but she was sleepy this morning and not engaging    6/28- remains confused with memory impairment. Pt has been hallucinating and paranoid- hiding behind her bed stating a women has been raped and stabbed. Received prn med. Sleep is better  6/29- episode of paranoid and mostly sun downing. Appear pleasant in the morning and no recollection of night time paranoia. unsteady gait due to sedation  6/30- pleasant and confused during the daytime, eating her breakfast. Still has paranoid behavior at night  7/1- slow progress as less sundowning and less paranoid. Pleasantly confused during the day time with no sig behavior problem  7/2- confused and disoriented with poor memory. Sun downing and worse in the evening with paranoia. 7/3- paranoid at night about a man coming to her room and killing. Fearful but pt is calm, pleasantly confused during daytime. Accepting med and no aggressive behavior  7/4- no sig behavior issue overnight and sleep is improved. Accepting med. Pleasant confused and with memory impairment  7/5- sleep is poor and remains paranoid at night seeing dead people. Need assistance with ADLs. No behavior issue  7/6- disturbed sleep last nigh when code s was called due to pt not responding- later cancelled as pt's vital were within normal and cxr was wnl to rule out aspiration. Pt is currently in bed responding to command and appear to be in no distress. Poor memory and attention  7/7-Pt was seen in her room with the RN. Presents alert, verbal, oriented to self only. Affect is flat. States she is feeling fine. Prn's used-Ambien 5 mg. No AVH or SI/HI. 7/8- Pt is incontinent of bowel and bladder, becomes irritable during ADL care. No significant changes from yesterday. Awaiting placement. 7/9- remains confused and disoriented to time and place. Pleasant and sleep is better. No behavior problem. Accepting med.    7/10- remains pleasant and confused with no behavior problem. Accepting med. Sleep is better. Poor memory and disoriented to time/place. 7/11- pleasant and co operative during the interview. She remains confused with poor memory. No delusional themes and sleep is improving.  7/12- pleasant and co operative. Remains disoriented, confused with poor memory. Slept well without prn med. No paranoid ideation  7/13- pleasant and confused. No behavior issue. Likes to color during group activities. Accepting med. Sleep is better  7/14- no behavior problem overnight. pleasant and with memory impairment. Need total care, bowel and bladder incontinent  7/15- no sig change in presentation, no behavior problem. Ready for placement. Memory impairment  7/16- poor memory, need total care, sleep is better and no behavior issue. 7/17- irritable when attended for ADLS/incontinence of bladder and bowel. Overall no physical aggression and pleasant and confuse. Accepting med  7/18- no sig behavior issue overnight. Accepting med and sleep is  Better. Pleasant and with memory impairment  7/19- stable presentation with brighter affect and memory impairment. Likes coloring . Sleep is erratic but appear rested. Accepting med. Need total care  7/20- no sig change in presentation. Remains confused with memory impairment. No behavior issue  7/21/18: seen today in her room, she was laying in bed, staff reported she did not eat her breakfast,no major behavioral issues,confuse due to cognitive impairment. Accpting med. SIDE EFFECTS: (reviewed/updated 7/21/2018)  None reported or admitted to.   No noted toxicity with use of current med   ALLERGIES:(reviewed/updated 7/21/2018)  Allergies   Allergen Reactions    Cephalosporins Itching    Codeine Hives    Erythromycin Hives    Nubain [Nalbuphine] Hives    Pcn [Penicillins] Hives    Percodan [Oxycodone Hcl-Oxycodone-Asa] Hives    Promethazine Hives    Protonix [Pantoprazole] Diarrhea    Sulfa (Sulfonamide Antibiotics) Hives    Toradol [Ketorolac] Hives    Ultram [Tramadol] Hives    Valium [Diazepam] Hives    Prilosec [Omeprazole] Itching      MEDICATIONS PRIOR TO ADMISSION:(reviewed/updated 7/21/2018)  Prescriptions Prior to Admission   Medication Sig    multivit-minerals/folic acid (WOMEN'S MULTIVITAMIN GUMMIES PO) Take 1 Tab by mouth daily.  QUEtiapine (SEROQUEL) 25 mg tablet Take 12.5 mg by mouth daily.  carvedilol (COREG) 3.125 mg tablet Take 3.125 mg by mouth two (2) times daily (with meals).  diclofenac (VOLTAREN) 1 % gel Apply 2 g to affected area every six (6) hours as needed (arthritis pain).  acetaminophen (TYLENOL) 325 mg tablet Take 650 mg by mouth every eight (8) hours as needed for Pain.  Menthol (ASPERCREME HEAT) 10 % gel by Apply Externally route every eight (8) hours as needed (arthritis).  aspirin 81 mg chewable tablet Take 81 mg by mouth daily. Indications: myocardial infarction prevention    dilTIAZem XR (DILACOR XR) 120 mg XR capsule Take 120 mg by mouth daily. Indications: hypertension, VENTRICULAR RATE CONTROL IN ATRIAL FIBRILLATION    furosemide (LASIX) 20 mg tablet Take 20 mg by mouth daily as needed. Indications: Edema, hypertension    loratadine (CLARITIN) 10 mg tablet Take 10 mg by mouth daily as needed. Indications: Allergic Rhinitis, SNEEZING    famotidine (PEPCID) 20 mg tablet Take 20 mg by mouth daily. Indications: Heartburn    QUEtiapine (SEROQUEL) 25 mg tablet Take 25 mg by mouth nightly. Indications: Generalized Anxiety Disorder      PAST MEDICAL HISTORY: Past medical history from the initial psychiatric evaluation has been reviewed (reviewed/updated 7/21/2018) with no additional updates (I asked patient and no additional past medical history provided). No past medical history on file. No past surgical history on file.    SOCIAL HISTORY: Social history from the initial psychiatric evaluation has been reviewed (reviewed/updated 7/21/2018) with no additional updates (I asked patient and no additional social history provided). Social History     Social History    Marital status: SINGLE     Spouse name: N/A    Number of children: N/A    Years of education: N/A     Occupational History    Not on file. Social History Main Topics    Smoking status: Not on file    Smokeless tobacco: Not on file    Alcohol use Not on file    Drug use: Not on file    Sexual activity: Not on file     Other Topics Concern    Not on file     Social History Narrative      FAMILY HISTORY: Family history from the initial psychiatric evaluation has been reviewed (reviewed/updated 7/21/2018) with no additional updates (I asked patient and no additional family history provided). No family history on file. REVIEW OF SYSTEMS: (reviewed/updated 7/21/2018)  Appetite:poor   Sleep: decreased more than normal   All other Review of Systems: Psychological ROS: positive for - behavioral disorder, concentration difficulties, disorientation and memory difficulties         [facility] ([facility]):    [facility] FINDINGS ARE WITHIN NORMAL LIMITS (WNL) UNLESS OTHERWISE STATED BELOW. ( ALL OF THE BELOW CATEGORIES OF THE MSE HAVE BEEN REVIEWED (reviewed 7/21/2018) AND UPDATED AS DEEMED APPROPRIATE )  General Presentation age appropriate and casually dressed, unreliable and vague   Orientation disorganized   Vital Signs  See below (reviewed 7/21/2018); Vital Signs (BP, Pulse, & Temp) are within normal limits if not listed below.    Gait and Station Stable/steady, no ataxia   Musculoskeletal System No extrapyramidal symptoms (EPS); no abnormal muscular movements or Tardive Dyskinesia (TD); muscle strength and tone are within normal limits   Language No aphasia or dysarthria   Speech:  hypoverbal   Thought Processes illogical; slow rate of thoughts; poor abstract reasoning/computation   Thought Associations tangential   Thought Content free of delusions, free of hallucinations and preoccupations Suicidal Ideations none   Homicidal Ideations none   Mood:  Less anxious   Affect:  anxious and increased in intensity   Memory recent  impaired   Memory remote:  impaired   Concentration/Attention:  distractable   Fund of Knowledge below average   Insight:  poor   Reliability poor   Judgment:  poor          VITALS:     Patient Vitals for the past 24 hrs:   Temp Pulse Resp BP SpO2   07/21/18 1751 - 64 16 137/45 -   07/21/18 0639 97.2 °F (36.2 °C) (!) 59 16 142/63 98 %     Wt Readings from Last 3 Encounters:   07/17/18 54.9 kg (121 lb)     Temp Readings from Last 3 Encounters:   07/21/18 97.2 °F (36.2 °C)   06/17/18 97.6 °F (36.4 °C)   06/15/18 97.6 °F (36.4 °C)     BP Readings from Last 3 Encounters:   07/21/18 137/45   06/17/18 102/47   06/16/18 99/55     Pulse Readings from Last 3 Encounters:   07/21/18 64   06/17/18 70   06/16/18 81            DATA     LABORATORY DATA:(reviewed/updated 7/21/2018)  No results found for this or any previous visit (from the past 24 hour(s)). No results found for: VALF2, VALAC, VALP, VALPR, DS6, CRBAM, CRBAMP, CARB2, XCRBAM  No results found for: LITHM   RADIOLOGY REPORTS:(reviewed/updated 7/21/2018)  Xr Chest Port    Result Date: 6/17/2018  EXAM:  XR CHEST PORT INDICATION:  Chest Pain COMPARISON:  None. FINDINGS: A portable AP radiograph of the chest was obtained at 720 hours. The patient is on a cardiac monitor. There is minor right basilar atelectasis. There is question of a vague right perihilar airspace process. There is slight diffuse interstitial prominence. Heart size is borderline enlarged. Patient status post median sternotomy. IMPRESSION: 1. There is suggestion of a vague right perihilar airspace process and follow-up studies would be helpful to assess for interval change. There is minor right basilar atelectasis.           MEDICATIONS     ALL MEDICATIONS:   Current Facility-Administered Medications   Medication Dose Route Frequency    dilTIAZem CD (CARDIZEM CD) capsule 120 mg  120 mg Oral DAILY    risperiDONE (RisperDAL) 1 mg/mL oral solution soln 1 mg  1 mg Oral QHS    memantine (NAMENDA) tablet 10 mg  10 mg Oral BID    ziprasidone (GEODON) 10 mg in sterile water (preservative free) 0.5 mL injection  10 mg IntraMUSCular BID PRN    benztropine (COGENTIN) tablet 1 mg  1 mg Oral BID PRN    benztropine (COGENTIN) injection 1 mg  1 mg IntraMUSCular BID PRN    acetaminophen (TYLENOL) tablet 650 mg  650 mg Oral Q4H PRN    magnesium hydroxide (MILK OF MAGNESIA) 400 mg/5 mL oral suspension 30 mL  30 mL Oral DAILY PRN    nicotine (NICODERM CQ) 21 mg/24 hr patch 1 Patch  1 Patch TransDERmal DAILY PRN    aspirin chewable tablet 81 mg  81 mg Oral DAILY    famotidine (PEPCID) tablet 20 mg  20 mg Oral DAILY    carvedilol (COREG) tablet 3.125 mg  3.125 mg Oral BID WITH MEALS      SCHEDULED MEDICATIONS:   Current Facility-Administered Medications   Medication Dose Route Frequency    dilTIAZem CD (CARDIZEM CD) capsule 120 mg  120 mg Oral DAILY    risperiDONE (RisperDAL) 1 mg/mL oral solution soln 1 mg  1 mg Oral QHS    memantine (NAMENDA) tablet 10 mg  10 mg Oral BID    aspirin chewable tablet 81 mg  81 mg Oral DAILY    famotidine (PEPCID) tablet 20 mg  20 mg Oral DAILY    carvedilol (COREG) tablet 3.125 mg  3.125 mg Oral BID WITH MEALS          ASSESSMENT & PLAN     DIAGNOSES REQUIRING ACTIVE TREATMENT AND MONITORING: (reviewed/updated 7/21/2018)  Patient Active Hospital Problem List:   Dementia (6/17/2018)    Assessment: moderate to severe- disoriented, poor memory and irritable. Need total care. No behavior problem and accepting med    Plan:  Yash Wells. Risperdal    Hypotension  A: low bp  P:ct with current tx-IM to follow    7/20- stable with no change-  waiting for placement- ct with current tx plan-  7/21/18: Stable ,waiting for placement.   Evaluated by IM for HTN- on med- stable    In summary, Jordan Jennings, is a 66 y.o.  female who presents with a severe exacerbation of the principal diagnosis of Dementia  Patient's condition is /not stable . Patient requires continued inpatient hospitalization for further stabilization, safety monitoring and medication management. I will continue to coordinate the provision of individual, milieu, occupational, group, and substance abuse therapies to address target symptoms/diagnoses as deemed appropriate for the individual patient. A coordinated, multidisplinary treatment team round was conducted with the patient (this team consists of the nurse, psychiatric unit pharmcist,  and writer). Complete current electronic health record for patient has been reviewed today including consultant notes, ancillary staff notes, nurses and psychiatric tech notes. Suicide risk assessment completed and patient deemed to be of low risk for suicide at this time. The following regarding medications was addressed during rounds with patient:   the risks and benefits of the proposed medication. The patient was given the opportunity to ask questions. Informed consent given to the use of the above medications. Will continue to adjust psychiatric and non-psychiatric medications (see above \"medication\" section and orders section for details) as deemed appropriate & based upon diagnoses and response to treatment. I will continue to order blood tests/labs and diagnostic tests as deemed appropriate and review results as they become available (see orders for details and above listed lab/test results). I will order psychiatric records from previous [facility] hospitals to further elucidate the nature of patient's psychopathology and review once available. I will gather additional collateral information from friends, family and o/p treatment team to further elucidate the nature of patient's psychopathology and baselline level of psychiatric functioning.          I certify that this patient's inpatient psychiatric hospital services furnished since the previous certification were, and continue to be, required for treatment that could reasonably be expected to improve the patient's condition, or for diagnostic study, and that the patient continues to need, on a daily basis, active treatment furnished directly by or requiring the supervision of inpatient psychiatric facility personnel. In addition, the hospital records show that services furnished were intensive treatment services, admission or related services, or equivalent services.     EXPECTED DISCHARGE DATE/DAY: TBD     DISPOSITION: Home/[facility]       Signed By:   Kirit Curtis MD  7/21/2018

## 2018-07-21 NOTE — PROGRESS NOTES
Problem: Dementia-BEHAVIORAL HEALTH (Adult/Pediatric)  Goal: *STG: Remains safe in hospital  Outcome: Progressing Towards Goal  Pt remains d/o and confused. Calm and Cooperative. Appropriate and visible in the milieu. Attending groups. Mood is good. Meds/meal compliant. No PRNs given this shift. No behavioral issues. Denies suicidal and homicidal ideations. Denies auditory and visual hallucinations. Will continue to monitor pt with q15 min rounds for safety.

## 2018-07-22 PROCEDURE — 74011250637 HC RX REV CODE- 250/637: Performed by: PSYCHIATRY & NEUROLOGY

## 2018-07-22 PROCEDURE — 65220000003 HC RM SEMIPRIVATE PSYCH

## 2018-07-22 PROCEDURE — 74011250637 HC RX REV CODE- 250/637: Performed by: INTERNAL MEDICINE

## 2018-07-22 RX ADMIN — MEMANTINE 10 MG: 10 TABLET ORAL at 18:02

## 2018-07-22 RX ADMIN — FAMOTIDINE 20 MG: 20 TABLET ORAL at 11:15

## 2018-07-22 RX ADMIN — ASPIRIN 81 MG 81 MG: 81 TABLET ORAL at 11:14

## 2018-07-22 RX ADMIN — CARVEDILOL 3.12 MG: 3.12 TABLET, FILM COATED ORAL at 18:02

## 2018-07-22 RX ADMIN — MEMANTINE 10 MG: 10 TABLET ORAL at 11:15

## 2018-07-22 RX ADMIN — CARVEDILOL 3.12 MG: 3.12 TABLET, FILM COATED ORAL at 11:14

## 2018-07-22 RX ADMIN — Medication 1 MG: at 21:20

## 2018-07-22 NOTE — PROGRESS NOTES
Problem: Dementia-BEHAVIORAL HEALTH (Adult/Pediatric)  Goal: *STG: Remains safe in hospital  Outcome: Progressing Towards Goal  Pt remains d/o. Calm and Cooperative. Flat affect. Appropriate and visible in the milieu. Attending groups. Mood is labile. Meds/meal compliant. No PRNs given this shift. No behavioral issues. Denies suicidal and homicidal ideations. Denies auditory and visual hallucinations. Will continue to monitor pt with q15 min rounds for safety.

## 2018-07-22 NOTE — BH NOTES
Problem: Depressed Mood (Adult/Pediatric)  Goal: *STG: Remains safe in hospital  Outcome: Progressing Towards Goal  Pt is mostly isolative from the milieu this morning. Pt presents as quiet, isolative but cooperative. Pt is meds/meals compliant. Pt was present in her treatment team meeting today. Pt remains confused and disoriented due to dementia. Pt is incontinent. Pt has been helped with her ADL.  Will continue to monitor q 15 for safety, mood and behavior changes.

## 2018-07-22 NOTE — BH NOTES
PSYCHIATRIC PROGRESS NOTE         Patient Name  Karri Garcia   Date of Birth 1940   [facility] [id number]   Medical Record Number  [id number]      Age  66 y.o. PCP Berenice Spears, MD   Admit date:  6/17/2018    Room Number  321/01  @ [facility]   Date of Service  7/22/2018           E & M PROGRESS NOTE:         HISTORY       CC:  \"confused, memory impairment\"  HISTORY OF PRESENT ILLNESS/INTERVAL HISTORY:  (reviewed/updated 7/22/2018). per initial evaluation: The patient, Karri Garcia, is a 68 y.o. WHITE [place]  female with a past psychiatric history significant for dementia, who presents at this time with complaints of (and/or evidence of) the following emotional symptoms: memory impairment. Additional symptomatology include disoriented to time and place, poor memory and behavior issues. pt has been incontent and need assistance with ADLs. She is irritable and easily gets loud and labile in mood. She was dc to medical unit for tx of hypotension and now back on U for further tx  The above symptoms have been present for years. These symptoms are of severe severity. These symptoms are constant  in nature. Karri Garcia presents/reports/evidences the following emotional symptoms today, 7/22/2018:memory impairment. The above symptoms have been present for years. These symptoms are of moderate severity. The symptoms are constant  in nature. Additional symptomatology and features include confused , disoriented to place and time. Pt is now accepting her med and need assistance with ADLs due to incontinence. No aggressive behavior and affect is bright , sleep is improving, pleasant and co operative. Received no prn.  6/25/18 she is compliant with medications and no anger issues like before and she gets confused and disoriented   6/26/18 she is doing better and mood is better and no anger issues and no SI or HI and slept well last night  6/27/18 she is the same and she is not engaging well and she has no anger issues and slept fairly last night but she was sleepy this morning and not engaging    6/28- remains confused with memory impairment. Pt has been hallucinating and paranoid- hiding behind her bed stating a women has been raped and stabbed. Received prn med. Sleep is better  6/29- episode of paranoid and mostly sun downing. Appear pleasant in the morning and no recollection of night time paranoia. unsteady gait due to sedation  6/30- pleasant and confused during the daytime, eating her breakfast. Still has paranoid behavior at night  7/1- slow progress as less sundowning and less paranoid. Pleasantly confused during the day time with no sig behavior problem  7/2- confused and disoriented with poor memory. Sun downing and worse in the evening with paranoia. 7/3- paranoid at night about a man coming to her room and killing. Fearful but pt is calm, pleasantly confused during daytime. Accepting med and no aggressive behavior  7/4- no sig behavior issue overnight and sleep is improved. Accepting med. Pleasant confused and with memory impairment  7/5- sleep is poor and remains paranoid at night seeing dead people. Need assistance with ADLs. No behavior issue  7/6- disturbed sleep last nigh when code s was called due to pt not responding- later cancelled as pt's vital were within normal and cxr was wnl to rule out aspiration. Pt is currently in bed responding to command and appear to be in no distress. Poor memory and attention  7/7-Pt was seen in her room with the RN. Presents alert, verbal, oriented to self only. Affect is flat. States she is feeling fine. Prn's used-Ambien 5 mg. No AVH or SI/HI. 7/8- Pt is incontinent of bowel and bladder, becomes irritable during ADL care. No significant changes from yesterday. Awaiting placement. 7/9- remains confused and disoriented to time and place. Pleasant and sleep is better. No behavior problem. Accepting med.    7/10- remains pleasant and confused with no behavior problem. Accepting med. Sleep is better. Poor memory and disoriented to time/place. 7/11- pleasant and co operative during the interview. She remains confused with poor memory. No delusional themes and sleep is improving.  7/12- pleasant and co operative. Remains disoriented, confused with poor memory. Slept well without prn med. No paranoid ideation  7/13- pleasant and confused. No behavior issue. Likes to color during group activities. Accepting med. Sleep is better  7/14- no behavior problem overnight. pleasant and with memory impairment. Need total care, bowel and bladder incontinent  7/15- no sig change in presentation, no behavior problem. Ready for placement. Memory impairment  7/16- poor memory, need total care, sleep is better and no behavior issue. 7/17- irritable when attended for ADLS/incontinence of bladder and bowel. Overall no physical aggression and pleasant and confuse. Accepting med  7/18- no sig behavior issue overnight. Accepting med and sleep is  Better. Pleasant and with memory impairment  7/19- stable presentation with brighter affect and memory impairment. Likes coloring . Sleep is erratic but appear rested. Accepting med. Need total care  7/20- no sig change in presentation. Remains confused with memory impairment. No behavior issue  7/21/18: seen today in her room, she was laying in bed, staff reported she did not eat her breakfast,no major behavioral issues,confuse due to cognitive impairment. Accpting med.  7/22/18: No significant change noted,accepting po medications, sleep and appetite is good, no agitation or aggression. SIDE EFFECTS: (reviewed/updated 7/22/2018)  None reported or admitted to.   No noted toxicity with use of current med   ALLERGIES:(reviewed/updated 7/22/2018)  Allergies   Allergen Reactions    Cephalosporins Itching    Codeine Hives    Erythromycin Hives    Nubain [Nalbuphine] Hives    Pcn [Penicillins] Hives    Percodan [Oxycodone Hcl-Oxycodone-Asa] Hives    Promethazine Hives    Protonix [Pantoprazole] Diarrhea    Sulfa (Sulfonamide Antibiotics) Hives    Toradol [Ketorolac] Hives    Ultram [Tramadol] Hives    Valium [Diazepam] Hives    Prilosec [Omeprazole] Itching      MEDICATIONS PRIOR TO ADMISSION:(reviewed/updated 7/22/2018)  Prescriptions Prior to Admission   Medication Sig    multivit-minerals/folic acid (WOMEN'S MULTIVITAMIN GUMMIES PO) Take 1 Tab by mouth daily.  QUEtiapine (SEROQUEL) 25 mg tablet Take 12.5 mg by mouth daily.  carvedilol (COREG) 3.125 mg tablet Take 3.125 mg by mouth two (2) times daily (with meals).  diclofenac (VOLTAREN) 1 % gel Apply 2 g to affected area every six (6) hours as needed (arthritis pain).  acetaminophen (TYLENOL) 325 mg tablet Take 650 mg by mouth every eight (8) hours as needed for Pain.  Menthol (ASPERCREME HEAT) 10 % gel by Apply Externally route every eight (8) hours as needed (arthritis).  aspirin 81 mg chewable tablet Take 81 mg by mouth daily. Indications: myocardial infarction prevention    dilTIAZem XR (DILACOR XR) 120 mg XR capsule Take 120 mg by mouth daily. Indications: hypertension, VENTRICULAR RATE CONTROL IN ATRIAL FIBRILLATION    furosemide (LASIX) 20 mg tablet Take 20 mg by mouth daily as needed. Indications: Edema, hypertension    loratadine (CLARITIN) 10 mg tablet Take 10 mg by mouth daily as needed. Indications: Allergic Rhinitis, SNEEZING    famotidine (PEPCID) 20 mg tablet Take 20 mg by mouth daily. Indications: Heartburn    QUEtiapine (SEROQUEL) 25 mg tablet Take 25 mg by mouth nightly. Indications: Generalized Anxiety Disorder      PAST MEDICAL HISTORY: Past medical history from the initial psychiatric evaluation has been reviewed (reviewed/updated 7/22/2018) with no additional updates (I asked patient and no additional past medical history provided). No past medical history on file. No past surgical history on file.    SOCIAL HISTORY: Social history from the initial psychiatric evaluation has been reviewed (reviewed/updated 7/22/2018) with no additional updates (I asked patient and no additional social history provided). Social History     Social History    Marital status: SINGLE     Spouse name: N/A    Number of children: N/A    Years of education: N/A     Occupational History    Not on file. Social History Main Topics    Smoking status: Not on file    Smokeless tobacco: Not on file    Alcohol use Not on file    Drug use: Not on file    Sexual activity: Not on file     Other Topics Concern    Not on file     Social History Narrative      FAMILY HISTORY: Family history from the initial psychiatric evaluation has been reviewed (reviewed/updated 7/22/2018) with no additional updates (I asked patient and no additional family history provided). No family history on file. REVIEW OF SYSTEMS: (reviewed/updated 7/22/2018)  Appetite:poor   Sleep: decreased more than normal   All other Review of Systems: Psychological ROS: positive for - behavioral disorder, concentration difficulties, disorientation and memory difficulties         [facility] ([facility]):    [facility] FINDINGS ARE WITHIN NORMAL LIMITS (WNL) UNLESS OTHERWISE STATED BELOW. ( ALL OF THE BELOW CATEGORIES OF THE [facility] HAVE BEEN REVIEWED (reviewed 7/22/2018) AND UPDATED AS DEEMED APPROPRIATE )  General Presentation age appropriate and casually dressed, unreliable and vague   Orientation disorganized   Vital Signs  See below (reviewed 7/22/2018); Vital Signs (BP, Pulse, & Temp) are within normal limits if not listed below.    Gait and Station Stable/steady, no ataxia   Musculoskeletal System No extrapyramidal symptoms (EPS); no abnormal muscular movements or Tardive Dyskinesia (TD); muscle strength and tone are within normal limits   Language No aphasia or dysarthria   Speech:  hypoverbal   Thought Processes illogical; slow rate of thoughts; poor abstract reasoning/computation   Thought Associations tangential   Thought Content free of delusions, free of hallucinations and preoccupations   Suicidal Ideations none   Homicidal Ideations none   Mood:  Less anxious   Affect:  anxious and increased in intensity   Memory recent  impaired   Memory remote:  impaired   Concentration/Attention:  distractable   Fund of Knowledge below average   Insight:  poor   Reliability poor   Judgment:  poor          VITALS:     Patient Vitals for the past 24 hrs:   Temp Pulse Resp BP   07/22/18 1802 - 65 - 130/59   07/22/18 1237 - 69 18 122/52   07/22/18 1114 - 60 - 137/47   07/22/18 0700 97 °F (36.1 °C) (!) 53 18 152/54     Wt Readings from Last 3 Encounters:   07/17/18 54.9 kg (121 lb)     Temp Readings from Last 3 Encounters:   07/22/18 97 °F (36.1 °C)   06/17/18 97.6 °F (36.4 °C)   06/15/18 97.6 °F (36.4 °C)     BP Readings from Last 3 Encounters:   07/22/18 130/59   06/17/18 102/47   06/16/18 99/55     Pulse Readings from Last 3 Encounters:   07/22/18 65   06/17/18 70   06/16/18 81            DATA     LABORATORY DATA:(reviewed/updated 7/22/2018)  No results found for this or any previous visit (from the past 24 hour(s)). No results found for: VALF2, VALAC, VALP, VALPR, DS6, CRBAM, CRBAMP, CARB2, XCRBAM  No results found for: LITHM   RADIOLOGY REPORTS:(reviewed/updated 7/22/2018)  Xr Chest Port    Result Date: 6/17/2018  EXAM:  XR CHEST PORT INDICATION:  Chest Pain COMPARISON:  None. FINDINGS: A portable AP radiograph of the chest was obtained at 720 hours. The patient is on a cardiac monitor. There is minor right basilar atelectasis. There is question of a vague right perihilar airspace process. There is slight diffuse interstitial prominence. Heart size is borderline enlarged. Patient status post median sternotomy. IMPRESSION: 1. There is suggestion of a vague right perihilar airspace process and follow-up studies would be helpful to assess for interval change.  There is minor right basilar atelectasis. MEDICATIONS     ALL MEDICATIONS:   Current Facility-Administered Medications   Medication Dose Route Frequency    dilTIAZem CD (CARDIZEM CD) capsule 120 mg  120 mg Oral DAILY    risperiDONE (RisperDAL) 1 mg/mL oral solution soln 1 mg  1 mg Oral QHS    memantine (NAMENDA) tablet 10 mg  10 mg Oral BID    ziprasidone (GEODON) 10 mg in sterile water (preservative free) 0.5 mL injection  10 mg IntraMUSCular BID PRN    benztropine (COGENTIN) tablet 1 mg  1 mg Oral BID PRN    benztropine (COGENTIN) injection 1 mg  1 mg IntraMUSCular BID PRN    acetaminophen (TYLENOL) tablet 650 mg  650 mg Oral Q4H PRN    magnesium hydroxide (MILK OF MAGNESIA) 400 mg/5 mL oral suspension 30 mL  30 mL Oral DAILY PRN    nicotine (NICODERM CQ) 21 mg/24 hr patch 1 Patch  1 Patch TransDERmal DAILY PRN    aspirin chewable tablet 81 mg  81 mg Oral DAILY    famotidine (PEPCID) tablet 20 mg  20 mg Oral DAILY    carvedilol (COREG) tablet 3.125 mg  3.125 mg Oral BID WITH MEALS      SCHEDULED MEDICATIONS:   Current Facility-Administered Medications   Medication Dose Route Frequency    dilTIAZem CD (CARDIZEM CD) capsule 120 mg  120 mg Oral DAILY    risperiDONE (RisperDAL) 1 mg/mL oral solution soln 1 mg  1 mg Oral QHS    memantine (NAMENDA) tablet 10 mg  10 mg Oral BID    aspirin chewable tablet 81 mg  81 mg Oral DAILY    famotidine (PEPCID) tablet 20 mg  20 mg Oral DAILY    carvedilol (COREG) tablet 3.125 mg  3.125 mg Oral BID WITH MEALS          ASSESSMENT & PLAN     DIAGNOSES REQUIRING ACTIVE TREATMENT AND MONITORING: (reviewed/updated 7/22/2018)  Patient Active Hospital Problem List:   Dementia (6/17/2018)    Assessment: moderate to severe- disoriented, poor memory and irritable. Need total care. No behavior problem and accepting med    Plan:  Dylan Atwood.  Risperdal    Hypotension  A: low bp  P:ct with current tx-IM to follow    7/20- stable with no change-  waiting for placement- ct with current tx plan-  7/21/18: Stable ,waiting for placement. 7/22/18: Adron Los for placement   Evaluated by IM for HTN- on med- stable    In summary, Lavon Youngblood, is a 66 y.o.  female who presents with a severe exacerbation of the principal diagnosis of Dementia  Patient's condition is /not stable . Patient requires continued inpatient hospitalization for further stabilization, safety monitoring and medication management. I will continue to coordinate the provision of individual, milieu, occupational, group, and substance abuse therapies to address target symptoms/diagnoses as deemed appropriate for the individual patient. A coordinated, multidisplinary treatment team round was conducted with the patient (this team consists of the nurse, psychiatric unit pharmcist,  and writer). Complete current electronic health record for patient has been reviewed today including consultant notes, ancillary staff notes, nurses and psychiatric tech notes. Suicide risk assessment completed and patient deemed to be of low risk for suicide at this time. The following regarding medications was addressed during rounds with patient:   the risks and benefits of the proposed medication. The patient was given the opportunity to ask questions. Informed consent given to the use of the above medications. Will continue to adjust psychiatric and non-psychiatric medications (see above \"medication\" section and orders section for details) as deemed appropriate & based upon diagnoses and response to treatment. I will continue to order blood tests/labs and diagnostic tests as deemed appropriate and review results as they become available (see orders for details and above listed lab/test results). I will order psychiatric records from previous [facility] hospitals to further elucidate the nature of patient's psychopathology and review once available.     I will gather additional collateral information from friends, family and o/p treatment team to further elucidate the nature of patient's psychopathology and baselline level of psychiatric functioning. I certify that this patient's inpatient psychiatric hospital services furnished since the previous certification were, and continue to be, required for treatment that could reasonably be expected to improve the patient's condition, or for diagnostic study, and that the patient continues to need, on a daily basis, active treatment furnished directly by or requiring the supervision of inpatient psychiatric facility personnel. In addition, the hospital records show that services furnished were intensive treatment services, admission or related services, or equivalent services.     EXPECTED DISCHARGE DATE/DAY: TBD     DISPOSITION: Home/halfway       Signed By:   Inna Crane MD  7/22/2018

## 2018-07-23 PROCEDURE — 74011250637 HC RX REV CODE- 250/637: Performed by: INTERNAL MEDICINE

## 2018-07-23 PROCEDURE — 74011250637 HC RX REV CODE- 250/637: Performed by: PSYCHIATRY & NEUROLOGY

## 2018-07-23 PROCEDURE — 65220000003 HC RM SEMIPRIVATE PSYCH

## 2018-07-23 RX ADMIN — CARVEDILOL 3.12 MG: 3.12 TABLET, FILM COATED ORAL at 17:13

## 2018-07-23 RX ADMIN — CARVEDILOL 3.12 MG: 3.12 TABLET, FILM COATED ORAL at 08:12

## 2018-07-23 RX ADMIN — Medication 1 MG: at 21:11

## 2018-07-23 RX ADMIN — MEMANTINE 10 MG: 10 TABLET ORAL at 17:12

## 2018-07-23 RX ADMIN — DILTIAZEM HYDROCHLORIDE 120 MG: 120 CAPSULE, COATED, EXTENDED RELEASE ORAL at 08:12

## 2018-07-23 RX ADMIN — FAMOTIDINE 20 MG: 20 TABLET ORAL at 08:12

## 2018-07-23 RX ADMIN — MEMANTINE 10 MG: 10 TABLET ORAL at 08:12

## 2018-07-23 RX ADMIN — ASPIRIN 81 MG 81 MG: 81 TABLET ORAL at 08:12

## 2018-07-23 NOTE — BH NOTES
7-11 Pt has been visible in the milieu, watches peers but has little interaction. Pt is med and meal compliant. Incontinence care provided. Calm, quiet and cooperative with staff. Will continue to monitor Q 15 minutes for safety.

## 2018-07-23 NOTE — BH NOTES
Patient continues to be alert to name only. Patient is incontinent. Patient is meal/med compliant. She attends groups and participate in selected activities according to mental ability.

## 2018-07-23 NOTE — PROGRESS NOTES
Problem: Dementia-BEHAVIORAL HEALTH (Adult/Pediatric)  Goal: *STG: Remains safe in hospital  Outcome: Progressing Towards Goal  Patient remains confused and oriented to name only. Participatory, with minimal interaction, in some groups/activities. Meal and emdication compliant. No evidence of SI/HI. No evidence of delusions. Assisted with incontinence care. Continues on q15 min safety checks. Will continue to monitor and continue plan of care.

## 2018-07-23 NOTE — BH NOTES
Pt sat quietly in bed for most of the night. Respirations even and unlabored. No s/s distress noted, no complaints voiced. Pt slept 1 hour. Will continue Q 15 minute monitoring for safety.

## 2018-07-23 NOTE — BH NOTES
GROUP THERAPY PROGRESS NOTE    The patient Bina lew 66 y.o. female is participating in [employer]. Group time: 1 hour    Personal goal for participation:  To concentrate on selected task    Goal orientation: social    Group therapy participation: minimal    Therapeutic interventions reviewed and discussed: Crafts, games, music    Impression of participation: The patient was present-elected to watch    UGE  7/23/2018  5:26 PM

## 2018-07-24 PROCEDURE — 74011250637 HC RX REV CODE- 250/637: Performed by: PSYCHIATRY & NEUROLOGY

## 2018-07-24 PROCEDURE — 65220000003 HC RM SEMIPRIVATE PSYCH

## 2018-07-24 PROCEDURE — 74011250637 HC RX REV CODE- 250/637: Performed by: INTERNAL MEDICINE

## 2018-07-24 RX ADMIN — MEMANTINE 10 MG: 10 TABLET ORAL at 17:29

## 2018-07-24 RX ADMIN — DILTIAZEM HYDROCHLORIDE 120 MG: 120 CAPSULE, COATED, EXTENDED RELEASE ORAL at 07:46

## 2018-07-24 RX ADMIN — CARVEDILOL 3.12 MG: 3.12 TABLET, FILM COATED ORAL at 17:28

## 2018-07-24 RX ADMIN — MEMANTINE 10 MG: 10 TABLET ORAL at 07:46

## 2018-07-24 RX ADMIN — ASPIRIN 81 MG 81 MG: 81 TABLET ORAL at 07:46

## 2018-07-24 RX ADMIN — FAMOTIDINE 20 MG: 20 TABLET ORAL at 07:46

## 2018-07-24 RX ADMIN — Medication 1 MG: at 21:08

## 2018-07-24 RX ADMIN — CARVEDILOL 3.12 MG: 3.12 TABLET, FILM COATED ORAL at 07:46

## 2018-07-24 NOTE — BH NOTES
GROUP THERAPY PROGRESS NOTE    The patient Keeley lew 66 y.o. female is participating in [address] [place] [address].      Group time: 45 minutes    Personal goal for participation: To develop a personal plan for success    Goal orientation:  personal    Group therapy participation: active    Therapeutic interventions reviewed and discussed: positive coping strategies/goals    Impression of participation:  The patient was attentive-required prompting    Brandon Jacob  7/24/2018  1:21 PM

## 2018-07-24 NOTE — BH NOTES
PSYCHIATRIC PROGRESS NOTE         Patient Name  Lamin Ricks   Date of Birth 1940   [facility] [id number]   Medical Record Number  [id number]      Age  66 y.o. PCP Berenice Spears, MD   Admit date:  6/17/2018    Room Number  321/01  @ [facility]   Date of Service  7/23/2018           E & M PROGRESS NOTE:         HISTORY       CC:  \"confused, memory impairment\"  HISTORY OF PRESENT ILLNESS/INTERVAL HISTORY:  (reviewed/updated 7/23/2018). per initial evaluation: The patient, Lamin Ricks, is a 68 y.o. WHITE [place]  female with a past psychiatric history significant for dementia, who presents at this time with complaints of (and/or evidence of) the following emotional symptoms: memory impairment. Additional symptomatology include disoriented to time and place, poor memory and behavior issues. pt has been incontent and need assistance with ADLs. She is irritable and easily gets loud and labile in mood. She was dc to medical unit for tx of hypotension and now back on U for further tx  The above symptoms have been present for years. These symptoms are of severe severity. These symptoms are constant  in nature. Lamin Ricks presents/reports/evidences the following emotional symptoms today, 7/23/2018:memory impairment. The above symptoms have been present for years. These symptoms are of moderate severity. The symptoms are constant  in nature. Additional symptomatology and features include confused , disoriented to place and time. Pt is now accepting her med and need assistance with ADLs due to incontinence. No aggressive behavior and affect is bright , sleep is improving, pleasant and co operative. Received no prn.  6/25/18 she is compliant with medications and no anger issues like before and she gets confused and disoriented   6/26/18 she is doing better and mood is better and no anger issues and no SI or HI and slept well last night  6/27/18 she is the same and she is not engaging well and she has no anger issues and slept fairly last night but she was sleepy this morning and not engaging    6/28- remains confused with memory impairment. Pt has been hallucinating and paranoid- hiding behind her bed stating a women has been raped and stabbed. Received prn med. Sleep is better  6/29- episode of paranoid and mostly sun downing. Appear pleasant in the morning and no recollection of night time paranoia. unsteady gait due to sedation  6/30- pleasant and confused during the daytime, eating her breakfast. Still has paranoid behavior at night  7/1- slow progress as less sundowning and less paranoid. Pleasantly confused during the day time with no sig behavior problem  7/2- confused and disoriented with poor memory. Sun downing and worse in the evening with paranoia. 7/3- paranoid at night about a man coming to her room and killing. Fearful but pt is calm, pleasantly confused during daytime. Accepting med and no aggressive behavior  7/4- no sig behavior issue overnight and sleep is improved. Accepting med. Pleasant confused and with memory impairment  7/5- sleep is poor and remains paranoid at night seeing dead people. Need assistance with ADLs. No behavior issue  7/6- disturbed sleep last nigh when code s was called due to pt not responding- later cancelled as pt's vital were within normal and cxr was wnl to rule out aspiration. Pt is currently in bed responding to command and appear to be in no distress. Poor memory and attention  7/7-Pt was seen in her room with the RN. Presents alert, verbal, oriented to self only. Affect is flat. States she is feeling fine. Prn's used-Ambien 5 mg. No AVH or SI/HI. 7/8- Pt is incontinent of bowel and bladder, becomes irritable during ADL care. No significant changes from yesterday. Awaiting placement. 7/9- remains confused and disoriented to time and place. Pleasant and sleep is better. No behavior problem. Accepting med.    7/10- remains pleasant and confused with no behavior problem. Accepting med. Sleep is better. Poor memory and disoriented to time/place. 7/11- pleasant and co operative during the interview. She remains confused with poor memory. No delusional themes and sleep is improving.  7/12- pleasant and co operative. Remains disoriented, confused with poor memory. Slept well without prn med. No paranoid ideation  7/13- pleasant and confused. No behavior issue. Likes to color during group activities. Accepting med. Sleep is better  7/14- no behavior problem overnight. pleasant and with memory impairment. Need total care, bowel and bladder incontinent  7/15- no sig change in presentation, no behavior problem. Ready for placement. Memory impairment  7/16- poor memory, need total care, sleep is better and no behavior issue. 7/17- irritable when attended for ADLS/incontinence of bladder and bowel. Overall no physical aggression and pleasant and confuse. Accepting med  7/18- no sig behavior issue overnight. Accepting med and sleep is  Better. Pleasant and with memory impairment  7/19- stable presentation with brighter affect and memory impairment. Likes coloring . Sleep is erratic but appear rested. Accepting med. Need total care  7/20- no sig change in presentation. Remains confused with memory impairment. No behavior issue  7/21/18: seen today in her room, she was laying in bed, staff reported she did not eat her breakfast,no major behavioral issues,confuse due to cognitive impairment. Accpting med.  7/22/18: No significant change noted,accepting po medications, sleep and appetite is good, no agitation or aggression. 7/23/18- Ms. Ida Clayton presents to rounds with flat affect, but engaging and communicative. Pleasant. Eating meals. Stable behavior. SIDE EFFECTS: (reviewed/updated 7/23/2018)  None reported or admitted to.   No noted toxicity with use of current med   ALLERGIES:(reviewed/updated 7/23/2018)  Allergies   Allergen Reactions    Cephalosporins Itching    Codeine Hives    Erythromycin Hives    Nubain [Nalbuphine] Hives    Pcn [Penicillins] Hives    Percodan [Oxycodone Hcl-Oxycodone-Asa] Hives    Promethazine Hives    Protonix [Pantoprazole] Diarrhea    Sulfa (Sulfonamide Antibiotics) Hives    Toradol [Ketorolac] Hives    Ultram [Tramadol] Hives    Valium [Diazepam] Hives    Prilosec [Omeprazole] Itching      MEDICATIONS PRIOR TO ADMISSION:(reviewed/updated 7/23/2018)  Prescriptions Prior to Admission   Medication Sig    multivit-minerals/folic acid (WOMEN'S MULTIVITAMIN GUMMIES PO) Take 1 Tab by mouth daily.  QUEtiapine (SEROQUEL) 25 mg tablet Take 12.5 mg by mouth daily.  carvedilol (COREG) 3.125 mg tablet Take 3.125 mg by mouth two (2) times daily (with meals).  diclofenac (VOLTAREN) 1 % gel Apply 2 g to affected area every six (6) hours as needed (arthritis pain).  acetaminophen (TYLENOL) 325 mg tablet Take 650 mg by mouth every eight (8) hours as needed for Pain.  Menthol (ASPERCREME HEAT) 10 % gel by Apply Externally route every eight (8) hours as needed (arthritis).  aspirin 81 mg chewable tablet Take 81 mg by mouth daily. Indications: myocardial infarction prevention    dilTIAZem XR (DILACOR XR) 120 mg XR capsule Take 120 mg by mouth daily. Indications: hypertension, VENTRICULAR RATE CONTROL IN ATRIAL FIBRILLATION    furosemide (LASIX) 20 mg tablet Take 20 mg by mouth daily as needed. Indications: Edema, hypertension    loratadine (CLARITIN) 10 mg tablet Take 10 mg by mouth daily as needed. Indications: Allergic Rhinitis, SNEEZING    famotidine (PEPCID) 20 mg tablet Take 20 mg by mouth daily. Indications: Heartburn    QUEtiapine (SEROQUEL) 25 mg tablet Take 25 mg by mouth nightly.  Indications: Generalized Anxiety Disorder      PAST MEDICAL HISTORY: Past medical history from the initial psychiatric evaluation has been reviewed (reviewed/updated 7/23/2018) with no additional updates (I asked patient and no additional past medical history provided). No past medical history on file. No past surgical history on file. SOCIAL HISTORY: Social history from the initial psychiatric evaluation has been reviewed (reviewed/updated 7/23/2018) with no additional updates (I asked patient and no additional social history provided). Social History     Social History    Marital status: SINGLE     Spouse name: N/A    Number of children: N/A    Years of education: N/A     Occupational History    Not on file. Social History Main Topics    Smoking status: Not on file    Smokeless tobacco: Not on file    Alcohol use Not on file    Drug use: Not on file    Sexual activity: Not on file     Other Topics Concern    Not on file     Social History Narrative      FAMILY HISTORY: Family history from the initial psychiatric evaluation has been reviewed (reviewed/updated 7/23/2018) with no additional updates (I asked patient and no additional family history provided). No family history on file. REVIEW OF SYSTEMS: (reviewed/updated 7/23/2018)  Appetite:poor   Sleep: decreased more than normal   All other Review of Systems: Psychological ROS: positive for - behavioral disorder, concentration difficulties, disorientation and memory difficulties         [facility] ([facility]    [facility] FINDINGS ARE WITHIN NORMAL LIMITS (WNL) UNLESS OTHERWISE STATED BELOW. ( ALL OF THE BELOW CATEGORIES OF THE [facility] HAVE BEEN REVIEWED (reviewed 7/23/2018) AND UPDATED AS DEEMED APPROPRIATE )  General Presentation age appropriate and casually dressed, unreliable and vague   Orientation disorganized   Vital Signs  See below (reviewed 7/23/2018); Vital Signs (BP, Pulse, & Temp) are within normal limits if not listed below.    Gait and Station Stable/steady, no ataxia   Musculoskeletal System No extrapyramidal symptoms (EPS); no abnormal muscular movements or Tardive Dyskinesia (TD); muscle strength and tone are within normal limits   Language No aphasia or dysarthria   Speech:  hypoverbal   Thought Processes illogical; slow rate of thoughts; poor abstract reasoning/computation   Thought Associations tangential   Thought Content free of delusions, free of hallucinations and preoccupations   Suicidal Ideations none   Homicidal Ideations none   Mood:  Less anxious   Affect:  anxious and increased in intensity   Memory recent  impaired   Memory remote:  impaired   Concentration/Attention:  distractable   Fund of Knowledge below average   Insight:  poor   Reliability poor   Judgment:  poor          VITALS:     Patient Vitals for the past 24 hrs:   Temp Pulse Resp BP   07/23/18 1735 - 68 17 138/54   07/23/18 0647 - 67 16 125/45     Wt Readings from Last 3 Encounters:   07/17/18 54.9 kg (121 lb)     Temp Readings from Last 3 Encounters:   06/17/18 97.6 °F (36.4 °C)   06/15/18 97.6 °F (36.4 °C)     BP Readings from Last 3 Encounters:   07/23/18 138/54   06/17/18 102/47   06/16/18 99/55     Pulse Readings from Last 3 Encounters:   07/23/18 68   06/17/18 70   06/16/18 81            DATA     LABORATORY DATA:(reviewed/updated 7/23/2018)  No results found for this or any previous visit (from the past 24 hour(s)). No results found for: VALF2, VALAC, VALP, VALPR, DS6, CRBAM, CRBAMP, CARB2, XCRBAM  No results found for: LITHM   RADIOLOGY REPORTS:(reviewed/updated 7/23/2018)  Xr Chest Port    Result Date: 6/17/2018  EXAM:  XR CHEST PORT INDICATION:  Chest Pain COMPARISON:  None. FINDINGS: A portable AP radiograph of the chest was obtained at 720 hours. The patient is on a cardiac monitor. There is minor right basilar atelectasis. There is question of a vague right perihilar airspace process. There is slight diffuse interstitial prominence. Heart size is borderline enlarged. Patient status post median sternotomy. IMPRESSION: 1. There is suggestion of a vague right perihilar airspace process and follow-up studies would be helpful to assess for interval change. There is minor right basilar atelectasis. MEDICATIONS     ALL MEDICATIONS:   Current Facility-Administered Medications   Medication Dose Route Frequency    dilTIAZem CD (CARDIZEM CD) capsule 120 mg  120 mg Oral DAILY    risperiDONE (RisperDAL) 1 mg/mL oral solution soln 1 mg  1 mg Oral QHS    memantine (NAMENDA) tablet 10 mg  10 mg Oral BID    ziprasidone (GEODON) 10 mg in sterile water (preservative free) 0.5 mL injection  10 mg IntraMUSCular BID PRN    benztropine (COGENTIN) tablet 1 mg  1 mg Oral BID PRN    benztropine (COGENTIN) injection 1 mg  1 mg IntraMUSCular BID PRN    acetaminophen (TYLENOL) tablet 650 mg  650 mg Oral Q4H PRN    magnesium hydroxide (MILK OF MAGNESIA) 400 mg/5 mL oral suspension 30 mL  30 mL Oral DAILY PRN    nicotine (NICODERM CQ) 21 mg/24 hr patch 1 Patch  1 Patch TransDERmal DAILY PRN    aspirin chewable tablet 81 mg  81 mg Oral DAILY    famotidine (PEPCID) tablet 20 mg  20 mg Oral DAILY    carvedilol (COREG) tablet 3.125 mg  3.125 mg Oral BID WITH MEALS      SCHEDULED MEDICATIONS:   Current Facility-Administered Medications   Medication Dose Route Frequency    dilTIAZem CD (CARDIZEM CD) capsule 120 mg  120 mg Oral DAILY    risperiDONE (RisperDAL) 1 mg/mL oral solution soln 1 mg  1 mg Oral QHS    memantine (NAMENDA) tablet 10 mg  10 mg Oral BID    aspirin chewable tablet 81 mg  81 mg Oral DAILY    famotidine (PEPCID) tablet 20 mg  20 mg Oral DAILY    carvedilol (COREG) tablet 3.125 mg  3.125 mg Oral BID WITH MEALS          ASSESSMENT & PLAN     DIAGNOSES REQUIRING ACTIVE TREATMENT AND MONITORING: (reviewed/updated 7/23/2018)  Patient Active Hospital Problem List:   Dementia (6/17/2018)    Assessment: moderate to severe- disoriented, poor memory and irritable. Need total care. No behavior problem and accepting med    Plan:  Kadeem Patterson.  Risperdal    Hypotension  A: low bp  P:ct with current tx-IM to follow    7/20- stable with no change-  waiting for placement- ct with current tx plan-  7/21/18: Stable ,waiting for placement. 7/22/18: [facility] for placement   Evaluated by IM for HTN- on med- stable    In summary, Nisreen Velasquez, is a 66 y.o.  female who presents with a severe exacerbation of the principal diagnosis of Dementia  Patient's condition is /not stable . Patient requires continued inpatient hospitalization for further stabilization, safety monitoring and medication management. I will continue to coordinate the provision of individual, milieu, occupational, group, and substance abuse therapies to address target symptoms/diagnoses as deemed appropriate for the individual patient. A coordinated, multidisplinary treatment team round was conducted with the patient (this team consists of the nurse, psychiatric unit pharmcist,  and writer). Complete current electronic health record for patient has been reviewed today including consultant notes, ancillary staff notes, nurses and psychiatric tech notes. Suicide risk assessment completed and patient deemed to be of low risk for suicide at this time. The following regarding medications was addressed during rounds with patient:   the risks and benefits of the proposed medication. The patient was given the opportunity to ask questions. Informed consent given to the use of the above medications. Will continue to adjust psychiatric and non-psychiatric medications (see above \"medication\" section and orders section for details) as deemed appropriate & based upon diagnoses and response to treatment. I will continue to order blood tests/labs and diagnostic tests as deemed appropriate and review results as they become available (see orders for details and above listed lab/test results). I will order psychiatric records from previous [facility] hospitals to further elucidate the nature of patient's psychopathology and review once available.     I will gather additional collateral information from friends, family and o/p treatment team to further elucidate the nature of patient's psychopathology and baselline level of psychiatric functioning. I certify that this patient's inpatient psychiatric hospital services furnished since the previous certification were, and continue to be, required for treatment that could reasonably be expected to improve the patient's condition, or for diagnostic study, and that the patient continues to need, on a daily basis, active treatment furnished directly by or requiring the supervision of inpatient psychiatric facility personnel. In addition, the hospital records show that services furnished were intensive treatment services, admission or related services, or equivalent services.     EXPECTED DISCHARGE DATE/DAY: TBD     DISPOSITION: Home/[facility]       Signed By:   Leny Murray MD  7/23/2018

## 2018-07-24 NOTE — BH NOTES
Pt is oriented only to self.  Pt is attending groups and meal compliant.  Pt spends time in milieu with staff and peers - was coloring and watching tv.  No behavior issues.   resent pt's updated MAR and nursing notes to [facility] where pt is on the wait list and is awaiting call back from [facility] [place].

## 2018-07-24 NOTE — BH NOTES
GROUP THERAPY PROGRESS NOTE    The patient Aaliyah Trujillo a 66 y.o. female is participating in [employer]. Group time: 1 hour    Personal goal for participation: To concentrate on selected task    Goal orientation: social    Group therapy participation: active    Therapeutic interventions reviewed and discussed: Crafts, games, music    Impression of participation: The patient was attentive.     Eric Damon  7/24/2018  6:03 PM

## 2018-07-24 NOTE — BH NOTES
Pt. Is up sitting at table upon assessment. Compliant with meals & medications. Incontinent of UA. Using incontinent pants. Sits at bedside during time in room. Affect flat, unemotional.  Appropriate upon approach.

## 2018-07-24 NOTE — BH NOTES
Pt observed resting quietly, respirations even and unlabored. No s/s distress noted, no complaints voiced. Pt slept 6 hours. Will continue Q 15 minute monitoring for safety.

## 2018-07-24 NOTE — BH NOTES
PSYCHIATRIC PROGRESS NOTE         Patient Name  Eh Sánchez   Date of Birth 1940   [facility] [id number]   Medical Record Number  [id number]      Age  66 y.o. PCP Berenice Spears, MD   Admit date:  6/17/2018    Room Number  322/01  @ [facility]   Date of Service  7/24/2018           E & M PROGRESS NOTE:         HISTORY       CC:  \"confused, memory impairment\"  HISTORY OF PRESENT ILLNESS/INTERVAL HISTORY:  (reviewed/updated 7/24/2018). per initial evaluation: The patient, Eh Sánchez, is a 68 y.o. WHITE [place]  female with a past psychiatric history significant for dementia, who presents at this time with complaints of (and/or evidence of) the following emotional symptoms: memory impairment. Additional symptomatology include disoriented to time and place, poor memory and behavior issues. pt has been incontent and need assistance with ADLs. She is irritable and easily gets loud and labile in mood. She was dc to medical unit for tx of hypotension and now back on U for further tx  The above symptoms have been present for years. These symptoms are of severe severity. These symptoms are constant  in nature. Eh Sánchez presents/reports/evidences the following emotional symptoms today, 7/24/2018:memory impairment. The above symptoms have been present for years. These symptoms are of moderate severity. The symptoms are constant  in nature. Additional symptomatology and features include confused , disoriented to place and time. Pt is now accepting her med and need assistance with ADLs due to incontinence. No aggressive behavior and affect is bright , sleep is improving, pleasant and co operative. Received no prn.  6/25/18 she is compliant with medications and no anger issues like before and she gets confused and disoriented   6/26/18 she is doing better and mood is better and no anger issues and no SI or HI and slept well last night  6/27/18 she is the same and she is not engaging well and she has no anger issues and slept fairly last night but she was sleepy this morning and not engaging    6/28- remains confused with memory impairment. Pt has been hallucinating and paranoid- hiding behind her bed stating a women has been raped and stabbed. Received prn med. Sleep is better  6/29- episode of paranoid and mostly sun downing. Appear pleasant in the morning and no recollection of night time paranoia. unsteady gait due to sedation  6/30- pleasant and confused during the daytime, eating her breakfast. Still has paranoid behavior at night  7/1- slow progress as less sundowning and less paranoid. Pleasantly confused during the day time with no sig behavior problem  7/2- confused and disoriented with poor memory. Sun downing and worse in the evening with paranoia. 7/3- paranoid at night about a man coming to her room and killing. Fearful but pt is calm, pleasantly confused during daytime. Accepting med and no aggressive behavior  7/4- no sig behavior issue overnight and sleep is improved. Accepting med. Pleasant confused and with memory impairment  7/5- sleep is poor and remains paranoid at night seeing dead people. Need assistance with ADLs. No behavior issue  7/6- disturbed sleep last nigh when code s was called due to pt not responding- later cancelled as pt's vital were within normal and cxr was wnl to rule out aspiration. Pt is currently in bed responding to command and appear to be in no distress. Poor memory and attention  7/7-Pt was seen in her room with the RN. Presents alert, verbal, oriented to self only. Affect is flat. States she is feeling fine. Prn's used-Ambien 5 mg. No AVH or SI/HI. 7/8- Pt is incontinent of bowel and bladder, becomes irritable during ADL care. No significant changes from yesterday. Awaiting placement. 7/9- remains confused and disoriented to time and place. Pleasant and sleep is better. No behavior problem. Accepting med.    7/10- remains pleasant and confused with no behavior problem. Accepting med. Sleep is better. Poor memory and disoriented to time/place. 7/11- pleasant and co operative during the interview. She remains confused with poor memory. No delusional themes and sleep is improving.  7/12- pleasant and co operative. Remains disoriented, confused with poor memory. Slept well without prn med. No paranoid ideation  7/13- pleasant and confused. No behavior issue. Likes to color during group activities. Accepting med. Sleep is better  7/14- no behavior problem overnight. pleasant and with memory impairment. Need total care, bowel and bladder incontinent  7/15- no sig change in presentation, no behavior problem. Ready for placement. Memory impairment  7/16- poor memory, need total care, sleep is better and no behavior issue. 7/17- irritable when attended for ADLS/incontinence of bladder and bowel. Overall no physical aggression and pleasant and confuse. Accepting med  7/18- no sig behavior issue overnight. Accepting med and sleep is  Better. Pleasant and with memory impairment  7/19- stable presentation with brighter affect and memory impairment. Likes coloring . Sleep is erratic but appear rested. Accepting med. Need total care  7/20- no sig change in presentation. Remains confused with memory impairment. No behavior issue  7/21/18: seen today in her room, she was laying in bed, staff reported she did not eat her breakfast,no major behavioral issues,confuse due to cognitive impairment. Accpting med.  7/22/18: No significant change noted,accepting po medications, sleep and appetite is good, no agitation or aggression. 7/23/18- Ms. Marcus Foster presents to rounds with flat affect, but engaging and communicative. Pleasant. Eating meals. Stable behavior. 7/24- pleasant and co operative. Appropriate smile and remains confused with poor memory. No behavior issue. SIDE EFFECTS: (reviewed/updated 7/24/2018)  None reported or admitted to.   No noted toxicity with use of current med   ALLERGIES:(reviewed/updated 7/24/2018)  Allergies   Allergen Reactions    Cephalosporins Itching    Codeine Hives    Erythromycin Hives    Nubain [Nalbuphine] Hives    Pcn [Penicillins] Hives    Percodan [Oxycodone Hcl-Oxycodone-Asa] Hives    Promethazine Hives    Protonix [Pantoprazole] Diarrhea    Sulfa (Sulfonamide Antibiotics) Hives    Toradol [Ketorolac] Hives    Ultram [Tramadol] Hives    Valium [Diazepam] Hives    Prilosec [Omeprazole] Itching      MEDICATIONS PRIOR TO ADMISSION:(reviewed/updated 7/24/2018)  Prescriptions Prior to Admission   Medication Sig    multivit-minerals/folic acid (WOMEN'S MULTIVITAMIN GUMMIES PO) Take 1 Tab by mouth daily.  QUEtiapine (SEROQUEL) 25 mg tablet Take 12.5 mg by mouth daily.  carvedilol (COREG) 3.125 mg tablet Take 3.125 mg by mouth two (2) times daily (with meals).  diclofenac (VOLTAREN) 1 % gel Apply 2 g to affected area every six (6) hours as needed (arthritis pain).  acetaminophen (TYLENOL) 325 mg tablet Take 650 mg by mouth every eight (8) hours as needed for Pain.  Menthol (ASPERCREME HEAT) 10 % gel by Apply Externally route every eight (8) hours as needed (arthritis).  aspirin 81 mg chewable tablet Take 81 mg by mouth daily. Indications: myocardial infarction prevention    dilTIAZem XR (DILACOR XR) 120 mg XR capsule Take 120 mg by mouth daily. Indications: hypertension, VENTRICULAR RATE CONTROL IN ATRIAL FIBRILLATION    furosemide (LASIX) 20 mg tablet Take 20 mg by mouth daily as needed. Indications: Edema, hypertension    loratadine (CLARITIN) 10 mg tablet Take 10 mg by mouth daily as needed. Indications: Allergic Rhinitis, SNEEZING    famotidine (PEPCID) 20 mg tablet Take 20 mg by mouth daily. Indications: Heartburn    QUEtiapine (SEROQUEL) 25 mg tablet Take 25 mg by mouth nightly.  Indications: Generalized Anxiety Disorder      PAST MEDICAL HISTORY: Past medical history from the initial psychiatric evaluation has been reviewed (reviewed/updated 7/24/2018) with no additional updates (I asked patient and no additional past medical history provided). No past medical history on file. No past surgical history on file. SOCIAL HISTORY: Social history from the initial psychiatric evaluation has been reviewed (reviewed/updated 7/24/2018) with no additional updates (I asked patient and no additional social history provided). Social History     Social History    Marital status: SINGLE     Spouse name: N/A    Number of children: N/A    Years of education: N/A     Occupational History    Not on file. Social History Main Topics    Smoking status: Not on file    Smokeless tobacco: Not on file    Alcohol use Not on file    Drug use: Not on file    Sexual activity: Not on file     Other Topics Concern    Not on file     Social History Narrative      FAMILY HISTORY: Family history from the initial psychiatric evaluation has been reviewed (reviewed/updated 7/24/2018) with no additional updates (I asked patient and no additional family history provided). No family history on file. REVIEW OF SYSTEMS: (reviewed/updated 7/24/2018)  Appetite:poor   Sleep: decreased more than normal   All other Review of Systems: Psychological ROS: positive for - behavioral disorder, concentration difficulties, disorientation and memory difficulties         [facility] (MSE):    MSE FINDINGS ARE WITHIN NORMAL LIMITS (WNL) UNLESS OTHERWISE STATED BELOW. ( ALL OF THE BELOW CATEGORIES OF THE [facility] HAVE BEEN REVIEWED (reviewed 7/24/2018) AND UPDATED AS DEEMED APPROPRIATE )  General Presentation age appropriate and casually dressed, unreliable and vague   Orientation disorganized   Vital Signs  See below (reviewed 7/24/2018); Vital Signs (BP, Pulse, & Temp) are within normal limits if not listed below.    Gait and Station Stable/steady, no ataxia   Musculoskeletal System No extrapyramidal symptoms (EPS); no abnormal muscular movements or Tardive Dyskinesia (TD); muscle strength and tone are within normal limits   Language No aphasia or dysarthria   Speech:  hypoverbal   Thought Processes illogical; slow rate of thoughts; poor abstract reasoning/computation   Thought Associations tangential   Thought Content free of delusions, free of hallucinations and preoccupations   Suicidal Ideations none   Homicidal Ideations none   Mood:  Less anxious   Affect:  anxious and increased in intensity   Memory recent  impaired   Memory remote:  impaired   Concentration/Attention:  distractable   Fund of Knowledge below average   Insight:  poor   Reliability poor   Judgment:  poor          VITALS:     Patient Vitals for the past 24 hrs:   Temp Pulse Resp BP   07/24/18 0608 97.9 °F (36.6 °C) (!) 59 16 123/55   07/23/18 1735 - 68 17 138/54     Wt Readings from Last 3 Encounters:   07/17/18 54.9 kg (121 lb)     Temp Readings from Last 3 Encounters:   07/24/18 97.9 °F (36.6 °C)   06/17/18 97.6 °F (36.4 °C)   06/15/18 97.6 °F (36.4 °C)     BP Readings from Last 3 Encounters:   07/24/18 123/55   06/17/18 102/47   06/16/18 99/55     Pulse Readings from Last 3 Encounters:   07/24/18 (!) 59   06/17/18 70   06/16/18 81            DATA     LABORATORY DATA:(reviewed/updated 7/24/2018)  No results found for this or any previous visit (from the past 24 hour(s)). No results found for: VALF2, VALAC, VALP, VALPR, DS6, CRBAM, CRBAMP, CARB2, XCRBAM  No results found for: LITHM   RADIOLOGY REPORTS:(reviewed/updated 7/24/2018)  Xr Chest Port    Result Date: 6/17/2018  EXAM:  XR CHEST PORT INDICATION:  Chest Pain COMPARISON:  None. FINDINGS: A portable AP radiograph of the chest was obtained at 720 hours. The patient is on a cardiac monitor. There is minor right basilar atelectasis. There is question of a vague right perihilar airspace process. There is slight diffuse interstitial prominence. Heart size is borderline enlarged.   Patient status post median sternotomy. IMPRESSION: 1. There is suggestion of a vague right perihilar airspace process and follow-up studies would be helpful to assess for interval change. There is minor right basilar atelectasis. MEDICATIONS     ALL MEDICATIONS:   Current Facility-Administered Medications   Medication Dose Route Frequency    dilTIAZem CD (CARDIZEM CD) capsule 120 mg  120 mg Oral DAILY    risperiDONE (RisperDAL) 1 mg/mL oral solution soln 1 mg  1 mg Oral QHS    memantine (NAMENDA) tablet 10 mg  10 mg Oral BID    ziprasidone (GEODON) 10 mg in sterile water (preservative free) 0.5 mL injection  10 mg IntraMUSCular BID PRN    benztropine (COGENTIN) tablet 1 mg  1 mg Oral BID PRN    benztropine (COGENTIN) injection 1 mg  1 mg IntraMUSCular BID PRN    acetaminophen (TYLENOL) tablet 650 mg  650 mg Oral Q4H PRN    magnesium hydroxide (MILK OF MAGNESIA) 400 mg/5 mL oral suspension 30 mL  30 mL Oral DAILY PRN    nicotine (NICODERM CQ) 21 mg/24 hr patch 1 Patch  1 Patch TransDERmal DAILY PRN    aspirin chewable tablet 81 mg  81 mg Oral DAILY    famotidine (PEPCID) tablet 20 mg  20 mg Oral DAILY    carvedilol (COREG) tablet 3.125 mg  3.125 mg Oral BID WITH MEALS      SCHEDULED MEDICATIONS:   Current Facility-Administered Medications   Medication Dose Route Frequency    dilTIAZem CD (CARDIZEM CD) capsule 120 mg  120 mg Oral DAILY    risperiDONE (RisperDAL) 1 mg/mL oral solution soln 1 mg  1 mg Oral QHS    memantine (NAMENDA) tablet 10 mg  10 mg Oral BID    aspirin chewable tablet 81 mg  81 mg Oral DAILY    famotidine (PEPCID) tablet 20 mg  20 mg Oral DAILY    carvedilol (COREG) tablet 3.125 mg  3.125 mg Oral BID WITH MEALS          ASSESSMENT & PLAN     DIAGNOSES REQUIRING ACTIVE TREATMENT AND MONITORING: (reviewed/updated 7/24/2018)  Patient Active Hospital Problem List:   Dementia (6/17/2018)    Assessment: moderate to severe- disoriented, poor memory and irritable. Need total care.  No behavior problem and accepting med    Plan:  Pam Juarez. Risperdal    Hypotension  A: low bp  P:ct with current tx-IM to follow    7/24-: Stable,waiting for placement   Evaluated by IM for HTN- on med- stable    In summary, Tessy Celeste, is a 66 y.o.  female who presents with a severe exacerbation of the principal diagnosis of Dementia  Patient's condition is /not stable . Patient requires continued inpatient hospitalization for further stabilization, safety monitoring and medication management. I will continue to coordinate the provision of individual, milieu, occupational, group, and substance abuse therapies to address target symptoms/diagnoses as deemed appropriate for the individual patient. A coordinated, multidisplinary treatment team round was conducted with the patient (this team consists of the nurse, psychiatric unit pharmcist,  and writer). Complete current electronic health record for patient has been reviewed today including consultant notes, ancillary staff notes, nurses and psychiatric tech notes. Suicide risk assessment completed and patient deemed to be of low risk for suicide at this time. The following regarding medications was addressed during rounds with patient:   the risks and benefits of the proposed medication. The patient was given the opportunity to ask questions. Informed consent given to the use of the above medications. Will continue to adjust psychiatric and non-psychiatric medications (see above \"medication\" section and orders section for details) as deemed appropriate & based upon diagnoses and response to treatment. I will continue to order blood tests/labs and diagnostic tests as deemed appropriate and review results as they become available (see orders for details and above listed lab/test results). I will order psychiatric records from previous [facility] hospitals to further elucidate the nature of patient's psychopathology and review once available.     I will gather additional collateral information from friends, family and o/p treatment team to further elucidate the nature of patient's psychopathology and baselline level of psychiatric functioning. I certify that this patient's inpatient psychiatric hospital services furnished since the previous certification were, and continue to be, required for treatment that could reasonably be expected to improve the patient's condition, or for diagnostic study, and that the patient continues to need, on a daily basis, active treatment furnished directly by or requiring the supervision of inpatient psychiatric facility personnel. In addition, the hospital records show that services furnished were intensive treatment services, admission or related services, or equivalent services.     EXPECTED DISCHARGE DATE/DAY: TBD     DISPOSITION: Home/USP       Signed By:   Amisha Thakkar MD  7/24/2018

## 2018-07-25 PROCEDURE — 74011250637 HC RX REV CODE- 250/637: Performed by: PSYCHIATRY & NEUROLOGY

## 2018-07-25 PROCEDURE — 65220000003 HC RM SEMIPRIVATE PSYCH

## 2018-07-25 PROCEDURE — 74011250637 HC RX REV CODE- 250/637: Performed by: INTERNAL MEDICINE

## 2018-07-25 RX ADMIN — MEMANTINE 10 MG: 10 TABLET ORAL at 17:51

## 2018-07-25 RX ADMIN — MEMANTINE 10 MG: 10 TABLET ORAL at 08:33

## 2018-07-25 RX ADMIN — Medication 1 MG: at 21:45

## 2018-07-25 RX ADMIN — ACETAMINOPHEN 650 MG: 325 TABLET, FILM COATED ORAL at 22:39

## 2018-07-25 RX ADMIN — DILTIAZEM HYDROCHLORIDE 120 MG: 120 CAPSULE, COATED, EXTENDED RELEASE ORAL at 08:32

## 2018-07-25 RX ADMIN — FAMOTIDINE 20 MG: 20 TABLET ORAL at 08:33

## 2018-07-25 RX ADMIN — ASPIRIN 81 MG 81 MG: 81 TABLET ORAL at 08:33

## 2018-07-25 RX ADMIN — CARVEDILOL 3.12 MG: 3.12 TABLET, FILM COATED ORAL at 08:33

## 2018-07-25 RX ADMIN — CARVEDILOL 3.12 MG: 3.12 TABLET, FILM COATED ORAL at 17:51

## 2018-07-25 NOTE — BH NOTES
7-11 Pt has been visible in milieu. Interacts appropriately with peers. Calm, quiet and cooperative with staff. Pt is medication and meal compliant. Will continue to monitor Q 15 minutes for safety and provide support.

## 2018-07-25 NOTE — BH NOTES
Pt observed resting quietly, respirations even and unlabored. No s/s distress noted, no complaints voices. Pt slept 7 hours. Will continue Q 15 minute monitoring for safety.

## 2018-07-25 NOTE — BH NOTES
Pt visible on the unit, meals and meds complaint, required help with ADL'S this morning. Pt remains on Q15 min checks for safety, will monitor and offer support when needed.

## 2018-07-25 NOTE — PROGRESS NOTES
MsKhris Dallin Velazco engaged in [facility] featuring Music on [address] [facility] unit    [address] [facility], .Div.   [facility] Service [phone]

## 2018-07-25 NOTE — BH NOTES
GROUP THERAPY PROGRESS NOTE    The patient Joy lew 66 y.o. female is participating in [employer] Group time: 45 minutes    Personal goal for participation: To participate in [employer]    Goal orientation:  social    Group therapy participation: active    Therapeutic interventions reviewed and discussed: benefits of music    Impression of participation:  The patient was attentive.     Deepak Carrasco  7/25/2018  12:56 PM

## 2018-07-26 PROCEDURE — 65220000003 HC RM SEMIPRIVATE PSYCH

## 2018-07-26 PROCEDURE — 74011250637 HC RX REV CODE- 250/637: Performed by: PSYCHIATRY & NEUROLOGY

## 2018-07-26 PROCEDURE — 74011250637 HC RX REV CODE- 250/637: Performed by: INTERNAL MEDICINE

## 2018-07-26 RX ORDER — NYSTATIN 100000 [USP'U]/G
POWDER TOPICAL 2 TIMES DAILY
Status: DISCONTINUED | OUTPATIENT
Start: 2018-07-26 | End: 2018-07-30 | Stop reason: HOSPADM

## 2018-07-26 RX ADMIN — CARVEDILOL 3.12 MG: 3.12 TABLET, FILM COATED ORAL at 08:10

## 2018-07-26 RX ADMIN — DILTIAZEM HYDROCHLORIDE 120 MG: 120 CAPSULE, COATED, EXTENDED RELEASE ORAL at 08:10

## 2018-07-26 RX ADMIN — FAMOTIDINE 20 MG: 20 TABLET ORAL at 08:10

## 2018-07-26 RX ADMIN — MEMANTINE 10 MG: 10 TABLET ORAL at 08:10

## 2018-07-26 RX ADMIN — Medication 1 MG: at 21:44

## 2018-07-26 RX ADMIN — CARVEDILOL 3.12 MG: 3.12 TABLET, FILM COATED ORAL at 16:38

## 2018-07-26 RX ADMIN — ASPIRIN 81 MG 81 MG: 81 TABLET ORAL at 08:10

## 2018-07-26 RX ADMIN — NYSTATIN: 100000 POWDER TOPICAL at 22:07

## 2018-07-26 RX ADMIN — MEMANTINE 10 MG: 10 TABLET ORAL at 16:38

## 2018-07-26 NOTE — BH NOTES
Pt observed resting quietly, respirations even and unlabored. No s/s distress noted, no complaints voiced. Pt slept 5.5 hours. Will continue Q 15 minute monitoring for safety.

## 2018-07-26 NOTE — BH NOTES
GROUP THERAPY PROGRESS NOTE    The patient Ansatasia Pillai a 66 y.o. female is participating in [facility] Context app. Group time: 45 minutes    Personal goal for participation:  To participate in coping skills memory game    Goal orientation:  personal    Group therapy participation: active    Therapeutic interventions reviewed and discussed: positive coping strategies a-z    Impression of participation:  The patient was attentive-required prompting and assistance from staff-oanh and cooperative    Matt Franco  7/26/2018  1:50 PM

## 2018-07-26 NOTE — BH NOTES
Patient was assisted with ADL's this morning. Affect remains flat, mood subdued. Patient ambulated to dayroom without assistance or difficulty. Patient was able to feed self. Patient ate at least 75% of meals. Patient has been cooperative  And has not required redirecting. Patient is alert only to name.

## 2018-07-26 NOTE — PROGRESS NOTES
Problem: Dementia-BEHAVIORAL HEALTH (Adult/Pediatric)  Goal: *STG: Remains safe in hospital  Outcome: Progressing Towards Goal  Patient continues at baseline confusion. Meal and medication compliant. Visible on the unit interacting minimally with staff and peers. No major behavioral issues. No signs/symptoms of pain/discomfort. Order placed for Nystatin powder BID for incontinence skin breakdown near sacral area. Continues on q15 min safety checks. Will continue to monitor and continue plan of care.

## 2018-07-26 NOTE — PROGRESS NOTES
General Daily Progress Note    Admit Date: 6/17/2018    Subjective:     Asked to see pt for rash in the folds of her geniats. Current Facility-Administered Medications   Medication Dose Route Frequency    nystatin (MYCOSTATIN) 100,000 unit/gram powder   Topical BID    dilTIAZem CD (CARDIZEM CD) capsule 120 mg  120 mg Oral DAILY    risperiDONE (RisperDAL) 1 mg/mL oral solution soln 1 mg  1 mg Oral QHS    memantine (NAMENDA) tablet 10 mg  10 mg Oral BID    ziprasidone (GEODON) 10 mg in sterile water (preservative free) 0.5 mL injection  10 mg IntraMUSCular BID PRN    benztropine (COGENTIN) tablet 1 mg  1 mg Oral BID PRN    benztropine (COGENTIN) injection 1 mg  1 mg IntraMUSCular BID PRN    acetaminophen (TYLENOL) tablet 650 mg  650 mg Oral Q4H PRN    magnesium hydroxide (MILK OF MAGNESIA) 400 mg/5 mL oral suspension 30 mL  30 mL Oral DAILY PRN    nicotine (NICODERM CQ) 21 mg/24 hr patch 1 Patch  1 Patch TransDERmal DAILY PRN    aspirin chewable tablet 81 mg  81 mg Oral DAILY    famotidine (PEPCID) tablet 20 mg  20 mg Oral DAILY    carvedilol (COREG) tablet 3.125 mg  3.125 mg Oral BID WITH MEALS            Objective:     Patient Vitals for the past 8 hrs:   BP Temp Pulse Resp   07/26/18 1639 120/57 98.1 °F (36.7 °C) (!) 58 16             Physical Exam:   Visit Vitals    /57 (BP 1 Location: Left arm, BP Patient Position: Sitting)    Pulse (!) 58    Temp 98.1 °F (36.7 °C)    Resp 16    Ht 5' 1.25\" (1.556 m)    Wt 54.9 kg (121 lb)    SpO2 100%    Breastfeeding No    BMI 22.68 kg/m2     General:  Alert, cooperative, no distress, appears stated age. Head:  Normocephalic, without obvious abnormality, atraumatic. Eyes:  Conjunctivae/corneas clear. PERRL, EOMs intact. Lungs:   Clear to auscultation bilaterally. Chest wall:  No tenderness or deformity. Heart:  Regular rate and rhythm, S1, S2 normal, no murmur, click, rub or gallop. Abdomen:   Soft, non-tender.  Bowel sounds normal. No masses,  No organomegaly. Erythematous rash folds of genitals. Extremities: Extremities normal, atraumatic, no cyanosis or edema. Pulses: 2+ and symmetric all extremities. Skin: Skin color, texture, turgor normal. No rashes or lesions             No results found for this or any previous visit (from the past 24 hour(s)). Assessment:     Principal Problem:    Dementia (6/17/2018)    Diaper rash    Plan:     Nystatin powder.     Barrier cream

## 2018-07-26 NOTE — BH NOTES
7-11 Pt has been visible in milieu. Interacts appropriately with peers when approached. Calm, quiet and cooperative with staff. Pt is medication and meal compliant. Will continue to monitor Q 15 minutes for safety and provide support.

## 2018-07-26 NOTE — BH NOTES
[facility]  IOP GROUP THERAPY NOTE    IOP Treatment Group: Education    Group Description: Education group led by unit staff.  Patients are educated on skills designed to support the patient's management of their diagnosis    Additional Description of Group:     Session Goal: Patient will gain understanding and treatment of their diagnosis    Group Facilitator: Vilma PAINTER    Co-Facilitator: carmen    Date: 7/25/18  Time: 9;00pm  Duration (Minutes): 45 min    Method: Free discussion    Patient Attendance: Less than 50%    Patient Level of Participation: Actively participated    Patient Behavior/Symptoms: Distracted    Patient Progress: Appears to be stable    Patient Progress Note:

## 2018-07-26 NOTE — PROGRESS NOTES
GROUP THERAPY PROGRESS NOTE      Hattie Fine was not present for medication group.       [address] [facility] Student, Class of [id number]

## 2018-07-26 NOTE — BH NOTES
Patient has been calm and cooperative. Patient complained of back pain. Patient received tylenol 650 mg for back as per doctor's order. Staff will continue to monitor patient every 15 minutes for safety.

## 2018-07-26 NOTE — BH NOTES
GROUP THERAPY PROGRESS NOTE    The patient Sid lew 66 y.o. female is participating in [employer]. Group time: 1 hour    Personal goal for participation:  To concentrate on selected task    Goal orientation: social    Group therapy participation: active    Therapeutic interventions reviewed and discussed: Crafts, games, music    Impression of participation: The patient was attentive-required prompting    Kj Duran  7/26/2018  4:56 PM

## 2018-07-27 PROCEDURE — 65220000003 HC RM SEMIPRIVATE PSYCH

## 2018-07-27 PROCEDURE — 74011250637 HC RX REV CODE- 250/637: Performed by: PSYCHIATRY & NEUROLOGY

## 2018-07-27 PROCEDURE — 74011250637 HC RX REV CODE- 250/637: Performed by: INTERNAL MEDICINE

## 2018-07-27 RX ADMIN — MEMANTINE 10 MG: 10 TABLET ORAL at 08:19

## 2018-07-27 RX ADMIN — DILTIAZEM HYDROCHLORIDE 120 MG: 120 CAPSULE, COATED, EXTENDED RELEASE ORAL at 08:19

## 2018-07-27 RX ADMIN — CARVEDILOL 3.12 MG: 3.12 TABLET, FILM COATED ORAL at 17:08

## 2018-07-27 RX ADMIN — ASPIRIN 81 MG 81 MG: 81 TABLET ORAL at 08:19

## 2018-07-27 RX ADMIN — NYSTATIN: 100000 POWDER TOPICAL at 09:43

## 2018-07-27 RX ADMIN — CARVEDILOL 3.12 MG: 3.12 TABLET, FILM COATED ORAL at 08:19

## 2018-07-27 RX ADMIN — MEMANTINE 10 MG: 10 TABLET ORAL at 17:08

## 2018-07-27 RX ADMIN — NYSTATIN: 100000 POWDER TOPICAL at 17:10

## 2018-07-27 RX ADMIN — FAMOTIDINE 20 MG: 20 TABLET ORAL at 08:19

## 2018-07-27 RX ADMIN — Medication 1 MG: at 22:21

## 2018-07-27 NOTE — PROGRESS NOTES
Patient currently in the dayroom eating lunch. Alert and oriented to person only. Visible in the milieu but keeps to self. Calm and cooperative; compliant with meals and medications. Patient requires assistance with ADLs. Remains confused and disorganized. Denies thoughts of harming self or others. Denies auditory, visual and tactile hallucinations. Will continue to do safety checks and monitor patient.

## 2018-07-27 NOTE — BH NOTES
Pt is oriented only to self.  Pt is attending groups and meal compliant.  Pt spends time in milieu with staff and peers - continues to color and watch tv.  No behavior issues. Pt was accepted by [facility] in [employer]. Address: [address] [place] [address], [employer], [address] Phone: [phone]  She will be picked up by medicaid cab doing a hand to hand transfer Monday at 10:00 AM - confirmation [id number]. Pt's daughter Christiano Mims was updated with plan for discharge.

## 2018-07-27 NOTE — BH NOTES
PSYCHIATRIC PROGRESS NOTE Patient Name  Hattie Fine  
Date of Birth 1940 [facility] [id number] Medical Record Number  [id number] Age  66 y.o. PCP Berenice Spears, MD  
Admit date:  6/17/2018 Room Number  322/01  @ [facility]  
Date of Service  7/27/2018 E & M PROGRESS NOTE: 
  
 
 
HISTORY  
   
CC:  \"confused, memory impairment\" HISTORY OF PRESENT ILLNESS/INTERVAL HISTORY:  (reviewed/updated 7/27/2018). per initial evaluation: The patient, Hattie Fine, is a 68 y.o. WHITE [place]  female with a past psychiatric history significant for dementia, who presents at this time with complaints of (and/or evidence of) the following emotional symptoms: memory impairment. Additional symptomatology include disoriented to time and place, poor memory and behavior issues. pt has been incontent and need assistance with ADLs. She is irritable and easily gets loud and labile in mood. She was dc to medical unit for tx of hypotension and now back on U for further tx  The above symptoms have been present for years. These symptoms are of severe severity. These symptoms are constant  in nature. Hattie Fine presents/reports/evidences the following emotional symptoms today, 7/27/2018:memory impairment. The above symptoms have been present for years. These symptoms are of moderate severity. The symptoms are constant  in nature. Additional symptomatology and features include confused , disoriented to place and time. Pt is now accepting her med and need assistance with ADLs due to incontinence. No aggressive behavior and affect is bright , sleep is improving, pleasant and co operative. Received no prn. 
6/25/18 she is compliant with medications and no anger issues like before and she gets confused and disoriented 6/26/18 she is doing better and mood is better and no anger issues and no SI or HI and slept well last night 6/27/18 she is the same and she is not engaging well and she has no anger issues and slept fairly last night but she was sleepy this morning and not engaging 6/28- remains confused with memory impairment. Pt has been hallucinating and paranoid- hiding behind her bed stating a women has been raped and stabbed. Received prn med. Sleep is better 6/29- episode of paranoid and mostly sun downing. Appear pleasant in the morning and no recollection of night time paranoia. unsteady gait due to sedation 6/30- pleasant and confused during the daytime, eating her breakfast. Still has paranoid behavior at night 7/1- slow progress as less sundowning and less paranoid. Pleasantly confused during the day time with no sig behavior problem 7/2- confused and disoriented with poor memory. Sun downing and worse in the evening with paranoia. 7/3- paranoid at night about a man coming to her room and killing. Fearful but pt is calm, pleasantly confused during daytime. Accepting med and no aggressive behavior 7/4- no sig behavior issue overnight and sleep is improved. Accepting med. Pleasant confused and with memory impairment 7/5- sleep is poor and remains paranoid at night seeing dead people. Need assistance with ADLs. No behavior issue 7/6- disturbed sleep last nigh when code s was called due to pt not responding- later cancelled as pt's vital were within normal and cxr was wnl to rule out aspiration. Pt is currently in bed responding to command and appear to be in no distress. Poor memory and attention 7/7-Pt was seen in her room with the RN. Presents alert, verbal, oriented to self only. Affect is flat. States she is feeling fine. Prn's used-Ambien 5 mg. No AVH or SI/HI. 7/8- Pt is incontinent of bowel and bladder, becomes irritable during ADL care. No significant changes from yesterday. Awaiting placement. 7/9- remains confused and disoriented to time and place. Pleasant and sleep is better. No behavior problem. Accepting med.   
7/10- remains pleasant and confused with no behavior problem. Accepting med. Sleep is better. Poor memory and disoriented to time/place. 7/11- pleasant and co operative during the interview. She remains confused with poor memory. No delusional themes and sleep is improving. 
7/12- pleasant and co operative. Remains disoriented, confused with poor memory. Slept well without prn med. No paranoid ideation 7/13- pleasant and confused. No behavior issue. Likes to color during group activities. Accepting med. Sleep is better 7/14- no behavior problem overnight. pleasant and with memory impairment. Need total care, bowel and bladder incontinent 7/15- no sig change in presentation, no behavior problem. Ready for placement. Memory impairment 7/16- poor memory, need total care, sleep is better and no behavior issue. 7/17- irritable when attended for ADLS/incontinence of bladder and bowel. Overall no physical aggression and pleasant and confuse. Accepting med 7/18- no sig behavior issue overnight. Accepting med and sleep is  Better. Pleasant and with memory impairment 7/19- stable presentation with brighter affect and memory impairment. Likes coloring . Sleep is erratic but appear rested. Accepting med. Need total care 7/20- no sig change in presentation. Remains confused with memory impairment. No behavior issue 7/21/18: seen today in her room, she was laying in bed, staff reported she did not eat her breakfast,no major behavioral issues,confuse due to cognitive impairment. Accpting med. 
7/22/18: No significant change noted,accepting po medications, sleep and appetite is good, no agitation or aggression. 7/23/18- Ms. Marciano Mclain presents to rounds with flat affect, but engaging and communicative. Pleasant. Eating meals. Stable behavior. 7/24- pleasant and co operative. Appropriate smile and remains confused with poor memory. No behavior issue. 7/25- no sig behavior issue. No agitation and accepting med. Pleasant and confused. Stated she is at [facility]". OX1 
7/26- no sig change in presentation. Need total care and memory impairment. Accepting med 7/27- no behavior problem, pleasant and confused. Seen by IM for diaper rash. No psychosis SIDE EFFECTS: (reviewed/updated 7/27/2018) None reported or admitted to. No noted toxicity with use of current med ALLERGIES:(reviewed/updated 7/27/2018) Allergies Allergen Reactions  Cephalosporins Itching  Codeine Hives  Erythromycin Hives  Nubain [Nalbuphine] Hives  Pcn [Penicillins] Hives  Percodan [Oxycodone Hcl-Oxycodone-Asa] Hives  Promethazine Hives  Protonix [Pantoprazole] Diarrhea  Sulfa (Sulfonamide Antibiotics) Hives  Toradol [Ketorolac] Hives  Ultram [Tramadol] Hives  Valium [Diazepam] Hives  Prilosec [Omeprazole] Itching MEDICATIONS PRIOR TO ADMISSION:(reviewed/updated 7/27/2018) Prescriptions Prior to Admission Medication Sig  
 multivit-minerals/folic acid (WOMEN'S MULTIVITAMIN GUMMIES PO) Take 1 Tab by mouth daily.  QUEtiapine (SEROQUEL) 25 mg tablet Take 12.5 mg by mouth daily.  carvedilol (COREG) 3.125 mg tablet Take 3.125 mg by mouth two (2) times daily (with meals).  diclofenac (VOLTAREN) 1 % gel Apply 2 g to affected area every six (6) hours as needed (arthritis pain).  acetaminophen (TYLENOL) 325 mg tablet Take 650 mg by mouth every eight (8) hours as needed for Pain.  Menthol (ASPERCREME HEAT) 10 % gel by Apply Externally route every eight (8) hours as needed (arthritis).  aspirin 81 mg chewable tablet Take 81 mg by mouth daily. Indications: myocardial infarction prevention  dilTIAZem XR (DILACOR XR) 120 mg XR capsule Take 120 mg by mouth daily. Indications: hypertension, VENTRICULAR RATE CONTROL IN ATRIAL FIBRILLATION  furosemide (LASIX) 20 mg tablet Take 20 mg by mouth daily as needed. Indications: Edema, hypertension  loratadine (CLARITIN) 10 mg tablet Take 10 mg by mouth daily as needed. Indications:  Allergic Rhinitis, SNEEZING  
 famotidine (PEPCID) 20 mg tablet Take 20 mg by mouth daily. Indications: Heartburn  QUEtiapine (SEROQUEL) 25 mg tablet Take 25 mg by mouth nightly. Indications: Generalized Anxiety Disorder PAST MEDICAL HISTORY: Past medical history from the initial psychiatric evaluation has been reviewed (reviewed/updated 7/27/2018) with no additional updates (I asked patient and no additional past medical history provided). No past medical history on file. No past surgical history on file. SOCIAL HISTORY: Social history from the initial psychiatric evaluation has been reviewed (reviewed/updated 7/27/2018) with no additional updates (I asked patient and no additional social history provided). Social History Social History  Marital status: SINGLE Spouse name: N/A  
 Number of children: N/A  
 Years of education: N/A Occupational History  Not on file. Social History Main Topics  Smoking status: Not on file  Smokeless tobacco: Not on file  Alcohol use Not on file  Drug use: Not on file  Sexual activity: Not on file Other Topics Concern  Not on file Social History Narrative FAMILY HISTORY: Family history from the initial psychiatric evaluation has been reviewed (reviewed/updated 7/27/2018) with no additional updates (I asked patient and no additional family history provided). No family history on file. REVIEW OF SYSTEMS: (reviewed/updated 7/27/2018) Appetite:poor Sleep: decreased more than normal  
All other Review of Systems: Psychological ROS: positive for - behavioral disorder, concentration difficulties, disorientation and memory difficulties [facility] MENTAL STATUS EXAM (MSE): MSE FINDINGS ARE WITHIN NORMAL LIMITS (WNL) UNLESS OTHERWISE STATED BELOW. ( ALL OF THE BELOW CATEGORIES OF THE MSE HAVE BEEN REVIEWED (reviewed 7/27/2018) AND UPDATED AS DEEMED APPROPRIATE ) General Presentation age appropriate and casually dressed, unreliable and vague Orientation disorganized Vital Signs  See below (reviewed 7/27/2018); Vital Signs (BP, Pulse, & Temp) are within normal limits if not listed below. Gait and Station Stable/steady, no ataxia Musculoskeletal System No extrapyramidal symptoms (EPS); no abnormal muscular movements or Tardive Dyskinesia (TD); muscle strength and tone are within normal limits Language No aphasia or dysarthria Speech:  hypoverbal  
Thought Processes illogical; slow rate of thoughts; poor abstract reasoning/computation Thought Associations tangential  
Thought Content free of delusions, free of hallucinations and preoccupations Suicidal Ideations none Homicidal Ideations none Mood:  Less anxious Affect:  anxious and increased in intensity Memory recent  impaired Memory remote:  impaired Concentration/Attention:  distractable Fund of Knowledge below average Insight:  poor Reliability poor Judgment:  poor VITALS:    
Patient Vitals for the past 24 hrs: 
 Temp Pulse Resp BP  
07/27/18 0645 98.1 °F (36.7 °C) 67 18 126/72  
07/26/18 1639 98.1 °F (36.7 °C) (!) 58 16 120/57 Wt Readings from Last 3 Encounters:  
07/17/18 54.9 kg (121 lb) Temp Readings from Last 3 Encounters:  
07/27/18 98.1 °F (36.7 °C)  
06/17/18 97.6 °F (36.4 °C)  
06/15/18 97.6 °F (36.4 °C) BP Readings from Last 3 Encounters:  
07/27/18 126/72  
06/17/18 102/47  
06/16/18 99/55 Pulse Readings from Last 3 Encounters:  
07/27/18 67  
06/17/18 70  
06/16/18 81 DATA LABORATORY DATA:(reviewed/updated 7/27/2018) No results found for this or any previous visit (from the past 24 hour(s)). No results found for: VALF2, VALAC, VALP, VALPR, DS6, CRBAM, CRBAMP, CARB2, XCRBAM 
No results found for: LITHM  
RADIOLOGY REPORTS:(reviewed/updated 7/27/2018) Xr Chest [facility] Result Date: 6/17/2018 EXAM:  XR CHEST PORT INDICATION:  Chest Pain COMPARISON:  None.  FINDINGS: A portable AP radiograph of the chest was obtained at 720 hours. The patient is on a cardiac monitor. There is minor right basilar atelectasis. There is question of a vague right perihilar airspace process. There is slight diffuse interstitial prominence. Heart size is borderline enlarged. Patient status post median sternotomy. IMPRESSION: 1. There is suggestion of a vague right perihilar airspace process and follow-up studies would be helpful to assess for interval change. There is minor right basilar atelectasis. MEDICATIONS ALL MEDICATIONS:  
Current Facility-Administered Medications Medication Dose Route Frequency  nystatin (MYCOSTATIN) 100,000 unit/gram powder   Topical BID  dilTIAZem CD (CARDIZEM CD) capsule 120 mg  120 mg Oral DAILY  risperiDONE (RisperDAL) 1 mg/mL oral solution soln 1 mg  1 mg Oral QHS  memantine (NAMENDA) tablet 10 mg  10 mg Oral BID  ziprasidone (GEODON) 10 mg in sterile water (preservative free) 0.5 mL injection  10 mg IntraMUSCular BID PRN  
 benztropine (COGENTIN) tablet 1 mg  1 mg Oral BID PRN  
 benztropine (COGENTIN) injection 1 mg  1 mg IntraMUSCular BID PRN  
 acetaminophen (TYLENOL) tablet 650 mg  650 mg Oral Q4H PRN  
 magnesium hydroxide (MILK OF MAGNESIA) 400 mg/5 mL oral suspension 30 mL  30 mL Oral DAILY PRN  
 nicotine (NICODERM CQ) 21 mg/24 hr patch 1 Patch  1 Patch TransDERmal DAILY PRN  
 aspirin chewable tablet 81 mg  81 mg Oral DAILY  famotidine (PEPCID) tablet 20 mg  20 mg Oral DAILY  carvedilol (COREG) tablet 3.125 mg  3.125 mg Oral BID WITH MEALS  
  
SCHEDULED MEDICATIONS:  
Current Facility-Administered Medications Medication Dose Route Frequency  nystatin (MYCOSTATIN) 100,000 unit/gram powder   Topical BID  dilTIAZem CD (CARDIZEM CD) capsule 120 mg  120 mg Oral DAILY  risperiDONE (RisperDAL) 1 mg/mL oral solution soln 1 mg  1 mg Oral QHS  memantine (NAMENDA) tablet 10 mg  10 mg Oral BID  aspirin chewable tablet 81 mg  81 mg Oral DAILY  famotidine (PEPCID) tablet 20 mg  20 mg Oral DAILY  carvedilol (COREG) tablet 3.125 mg  3.125 mg Oral BID WITH MEALS  
  
 
 
ASSESSMENT & PLAN  
 
DIAGNOSES REQUIRING ACTIVE TREATMENT AND MONITORING: (reviewed/updated 7/27/2018) Patient Active Hospital Problem List: 
 Dementia (6/17/2018) Assessment: moderate to severe- disoriented, poor memory and irritable. Need total care. No behavior problem and accepting med Plan:  Namenda. Risperdal 
 
Hypotension A: low bp 
P:ct with current tx-IM to follow 7/26-: ct with current medications. Placement. 7/27- ct with medication- accepted by [facility] In summary, Morenita Atwood, is a 66 y.o.  female who presents with a severe exacerbation of the principal diagnosis of Dementia Patient's condition is /not stable . Patient requires continued inpatient hospitalization for further stabilization, safety monitoring and medication management. I will continue to coordinate the provision of individual, milieu, occupational, group, and substance abuse therapies to address target symptoms/diagnoses as deemed appropriate for the individual patient. A coordinated, multidisplinary treatment team round was conducted with the patient (this team consists of the nurse, psychiatric unit pharmcist,  and writer). Complete current electronic health record for patient has been reviewed today including consultant notes, ancillary staff notes, nurses and psychiatric tech notes. Suicide risk assessment completed and patient deemed to be of low risk for suicide at this time. The following regarding medications was addressed during rounds with patient:  
the risks and benefits of the proposed medication. The patient was given the opportunity to ask questions. Informed consent given to the use of the above medications.  Will continue to adjust psychiatric and non-psychiatric medications (see above \"medication\" section and orders section for details) as deemed appropriate & based upon diagnoses and response to treatment. I will continue to order blood tests/labs and diagnostic tests as deemed appropriate and review results as they become available (see orders for details and above listed lab/test results). I will order psychiatric records from previous [facility] to further elucidate the nature of patient's psychopathology and review once available. I will gather additional collateral information from friends, family and o/p treatment team to further elucidate the nature of patient's psychopathology and baselline level of psychiatric functioning. I certify that this patient's inpatient psychiatric hospital services furnished since the previous certification were, and continue to be, required for treatment that could reasonably be expected to improve the patient's condition, or for diagnostic study, and that the patient continues to need, on a daily basis, active treatment furnished directly by or requiring the supervision of inpatient psychiatric facility personnel. In addition, the hospital records show that services furnished were intensive treatment services, admission or related services, or equivalent services. EXPECTED DISCHARGE DATE/DAY: Monday DISPOSITION: Home/FDC Signed By:  
Paulette Mendoza MD 
7/27/2018

## 2018-07-27 NOTE — BH NOTES
GROUP THERAPY PROGRESS NOTE The patient Charity lew 66 y.o. female is participating in [employer]. Group time: 1 hour Personal goal for participation: To concentrate on selected task Goal orientation: social 
 
Group therapy participation: minimal 
 
Therapeutic interventions reviewed and discussed: Crafts, games, music Impression of participation: The patient was present-pleasant upon approach Marques Arredondo 7/27/2018  5:41 PM

## 2018-07-27 NOTE — BH NOTES
GROUP THERAPY PROGRESS NOTE The patient Maria Isabel lew 66 y.o. female is participating in [employer]. Group time: 45 minutes Personal goal for participation: To participate in recovery bingo game Goal orientation:  personal 
 
Group therapy participation: minimal 
 
Therapeutic interventions reviewed and discussed: choices in recovery Impression of participation:  The patient was present-pt sat quietly off to herself, completing a meal-pleasant and cooperative Chaz Florence 7/27/2018  1:41 PM

## 2018-07-28 ENCOUNTER — HOSPITAL ENCOUNTER (OUTPATIENT)
Dept: GENERAL RADIOLOGY | Age: 78
Discharge: HOME | End: 2018-07-28
Attending: PSYCHIATRY & NEUROLOGY
Payer: MEDICARE

## 2018-07-28 PROCEDURE — 65220000003 HC RM SEMIPRIVATE PSYCH

## 2018-07-28 PROCEDURE — 74011250637 HC RX REV CODE- 250/637: Performed by: INTERNAL MEDICINE

## 2018-07-28 PROCEDURE — 71045 X-RAY EXAM CHEST 1 VIEW: CPT

## 2018-07-28 PROCEDURE — 74011250637 HC RX REV CODE- 250/637: Performed by: PSYCHIATRY & NEUROLOGY

## 2018-07-28 RX ORDER — CLINDAMYCIN HYDROCHLORIDE 150 MG/1
600 CAPSULE ORAL 2 TIMES DAILY
Status: DISCONTINUED | OUTPATIENT
Start: 2018-07-28 | End: 2018-07-30 | Stop reason: HOSPADM

## 2018-07-28 RX ADMIN — NYSTATIN: 100000 POWDER TOPICAL at 13:09

## 2018-07-28 RX ADMIN — Medication 1 MG: at 19:52

## 2018-07-28 RX ADMIN — DILTIAZEM HYDROCHLORIDE 120 MG: 120 CAPSULE, COATED, EXTENDED RELEASE ORAL at 13:09

## 2018-07-28 RX ADMIN — MEMANTINE 10 MG: 10 TABLET ORAL at 13:08

## 2018-07-28 RX ADMIN — CARVEDILOL 3.12 MG: 3.12 TABLET, FILM COATED ORAL at 16:31

## 2018-07-28 RX ADMIN — ASPIRIN 81 MG 81 MG: 81 TABLET ORAL at 13:08

## 2018-07-28 RX ADMIN — FAMOTIDINE 20 MG: 20 TABLET ORAL at 13:09

## 2018-07-28 RX ADMIN — CLINDAMYCIN HYDROCHLORIDE 600 MG: 150 CAPSULE ORAL at 19:53

## 2018-07-28 RX ADMIN — MEMANTINE 10 MG: 10 TABLET ORAL at 16:31

## 2018-07-28 RX ADMIN — CARVEDILOL 3.12 MG: 3.12 TABLET, FILM COATED ORAL at 13:08

## 2018-07-28 RX ADMIN — NYSTATIN: 100000 POWDER TOPICAL at 16:31

## 2018-07-28 NOTE — BH NOTES
Approximately 12:30-12:45p (lunch time) During round checks, patient was observed sitting at the table in the day room vomiting up food. Staff approached patient and it appeared that patient was choking on her food. Staff called and sent for assistance. A nurse from the acute unit came over to assist staff and patient.

## 2018-07-28 NOTE — BH NOTES
Problem: Depressed Mood (Adult/Pediatric) Goal: *STG: Remains safe in hospital 
Outcome: Progressing Towards Goal 
Pt is visible in the milieu. Pt presents as quiet and isolative, confused but cooperative. Pt is meds/meals compliant. Dr. Jhonatan Collins saw pt and will order treatment. Will continue to monitor q 15 for safety, mood and behavior changes.

## 2018-07-28 NOTE — PROGRESS NOTES
Problem: [address] (Adult/Pediatric) Goal: *STG: Remains safe in hospital 
Outcome: Progressing Towards Goal 
Pt remains disoriented. Calm and Cooperative. Appropriate and visible in the milieu. Attending groups. Mood is good. Meds/meal compliant. No PRNs given this shift. No behavioral issues. Denies suicidal and homicidal ideations. Denies auditory and visual hallucinations. Will continue to monitor pt with q15 min rounds for safety.

## 2018-07-28 NOTE — PROGRESS NOTES
Problem: [address] (Adult/Pediatric) Goal: *STG: Remains safe in hospital 
Outcome: Progressing Towards Goal 
Pt slept 4 hours. Pt had uneventful night and required no PRN's. Pt had no complaints or signs of distress throughout night. Respirations were unlabored. Continuing to monitor with q15 min rounds for safety.

## 2018-07-28 NOTE — BH NOTES
Dr. Paulette Schilder was called regarding's vomit. Order received for stat chest X-ray to R/O food aspiration. Pt is alert and oriented to self only as usual due to dementia. Pt's VS stable. P 77, /65. Dr. Paulette Schilder will see pt later this evening.

## 2018-07-28 NOTE — PROGRESS NOTES
General Daily Progress Note Admit Date: 6/17/2018 Subjective:  
 
Patient is noted to  Be congested in her chest after vomiting. Cough is productive, 
 
 
Current Facility-Administered Medications Medication Dose Route Frequency  nystatin (MYCOSTATIN) 100,000 unit/gram powder   Topical BID  dilTIAZem CD (CARDIZEM CD) capsule 120 mg  120 mg Oral DAILY  risperiDONE (RisperDAL) 1 mg/mL oral solution soln 1 mg  1 mg Oral QHS  memantine (NAMENDA) tablet 10 mg  10 mg Oral BID  ziprasidone (GEODON) 10 mg in sterile water (preservative free) 0.5 mL injection  10 mg IntraMUSCular BID PRN  
 benztropine (COGENTIN) tablet 1 mg  1 mg Oral BID PRN  
 benztropine (COGENTIN) injection 1 mg  1 mg IntraMUSCular BID PRN  
 acetaminophen (TYLENOL) tablet 650 mg  650 mg Oral Q4H PRN  
 magnesium hydroxide (MILK OF MAGNESIA) 400 mg/5 mL oral suspension 30 mL  30 mL Oral DAILY PRN  
 nicotine (NICODERM CQ) 21 mg/24 hr patch 1 Patch  1 Patch TransDERmal DAILY PRN  
 aspirin chewable tablet 81 mg  81 mg Oral DAILY  famotidine (PEPCID) tablet 20 mg  20 mg Oral DAILY  carvedilol (COREG) tablet 3.125 mg  3.125 mg Oral BID WITH MEALS Objective:  
 
Patient Vitals for the past 8 hrs: 
 BP Temp Pulse Resp  
07/28/18 1633 140/65 - 77 -  
07/28/18 1459 144/58 98.5 °F (36.9 °C) 68 -  
07/28/18 1315 135/77 97.9 °F (36.6 °C) 69 16 Physical Exam:  
Visit Vitals  /65  Pulse 77  Temp 98.5 °F (36.9 °C)  Resp 16  
 Ht 5' 1.25\" (1.556 m)  Wt 54.9 kg (121 lb)  SpO2 100%  Breastfeeding No  
 BMI 22.68 kg/m2 General:  Alert, cooperative, no distress, appears stated age. Head:  Normocephalic, without obvious abnormality, atraumatic. Eyes:  Conjunctivae/corneas clear. PERRL, EOMs intact. Lungs:   Clear to auscultation bilaterally. Chest wall:  No tenderness or deformity. Heart:  Regular rate and rhythm, S1, S2 normal, no murmur, click, rub or gallop. Abdomen:   Soft, non-tender. Bowel sounds normal. No masses,  No organomegaly. Extremities: Extremities normal, atraumatic, no cyanosis or edema. Pulses: 2+ and symmetric all extremities. Skin: Skin color, texture, turgor normal. No rashes or lesions No results found for this or any previous visit (from the past 24 hour(s)). Assessment:  
 
Principal Problem: 
  Dementia (6/17/2018) Aspiration Bronchitis Plan:  
 
Start Clindamycin 600mg po bid.

## 2018-07-29 LAB
ALBUMIN SERPL-MCNC: 2.7 G/DL (ref 3.5–5)
ALBUMIN/GLOB SERPL: 0.8 {RATIO} (ref 1.1–2.2)
ALP SERPL-CCNC: 101 U/L (ref 45–117)
ALT SERPL-CCNC: 14 U/L (ref 12–78)
ANION GAP SERPL CALC-SCNC: 6 MMOL/L (ref 5–15)
AST SERPL-CCNC: 17 U/L (ref 15–37)
BASOPHILS # BLD: 0 K/UL (ref 0–0.1)
BASOPHILS NFR BLD: 0 % (ref 0–1)
BILIRUB SERPL-MCNC: 0.3 MG/DL (ref 0.2–1)
BUN SERPL-MCNC: 21 MG/DL (ref 6–20)
BUN/CREAT SERPL: 23 (ref 12–20)
CALCIUM SERPL-MCNC: 8.5 MG/DL (ref 8.5–10.1)
CHLORIDE SERPL-SCNC: 108 MMOL/L (ref 97–108)
CO2 SERPL-SCNC: 30 MMOL/L (ref 21–32)
CREAT SERPL-MCNC: 0.93 MG/DL (ref 0.55–1.02)
DIFFERENTIAL METHOD BLD: NORMAL
EOSINOPHIL # BLD: 0.2 K/UL (ref 0–0.4)
EOSINOPHIL NFR BLD: 4 % (ref 0–7)
ERYTHROCYTE [DISTWIDTH] IN BLOOD BY AUTOMATED COUNT: 14.2 % (ref 11.5–14.5)
GLOBULIN SER CALC-MCNC: 3.2 G/DL (ref 2–4)
GLUCOSE SERPL-MCNC: 135 MG/DL (ref 65–100)
HCT VFR BLD AUTO: 38 % (ref 35–47)
HGB BLD-MCNC: 12.4 G/DL (ref 11.5–16)
IMM GRANULOCYTES # BLD: 0 K/UL (ref 0–0.04)
IMM GRANULOCYTES NFR BLD AUTO: 0 % (ref 0–0.5)
LYMPHOCYTES # BLD: 2.1 K/UL (ref 0.8–3.5)
LYMPHOCYTES NFR BLD: 33 % (ref 12–49)
MAGNESIUM SERPL-MCNC: 1.7 MG/DL (ref 1.6–2.4)
MCH RBC QN AUTO: 29.8 PG (ref 26–34)
MCHC RBC AUTO-ENTMCNC: 32.6 G/DL (ref 30–36.5)
MCV RBC AUTO: 91.3 FL (ref 80–99)
MONOCYTES # BLD: 0.6 K/UL (ref 0–1)
MONOCYTES NFR BLD: 9 % (ref 5–13)
NEUTS SEG # BLD: 3.4 K/UL (ref 1.8–8)
NEUTS SEG NFR BLD: 54 % (ref 32–75)
NRBC # BLD: 0 K/UL (ref 0–0.01)
NRBC BLD-RTO: 0 PER 100 WBC
PLATELET # BLD AUTO: 170 K/UL (ref 150–400)
PMV BLD AUTO: 10.5 FL (ref 8.9–12.9)
POTASSIUM SERPL-SCNC: 4.2 MMOL/L (ref 3.5–5.1)
PROT SERPL-MCNC: 5.9 G/DL (ref 6.4–8.2)
RBC # BLD AUTO: 4.16 M/UL (ref 3.8–5.2)
SODIUM SERPL-SCNC: 144 MMOL/L (ref 136–145)
WBC # BLD AUTO: 6.3 K/UL (ref 3.6–11)

## 2018-07-29 PROCEDURE — 65220000003 HC RM SEMIPRIVATE PSYCH

## 2018-07-29 PROCEDURE — 83735 ASSAY OF MAGNESIUM: CPT | Performed by: HOSPITALIST

## 2018-07-29 PROCEDURE — 85025 COMPLETE CBC W/AUTO DIFF WBC: CPT | Performed by: HOSPITALIST

## 2018-07-29 PROCEDURE — 74011250637 HC RX REV CODE- 250/637: Performed by: PSYCHIATRY & NEUROLOGY

## 2018-07-29 PROCEDURE — 74011250637 HC RX REV CODE- 250/637: Performed by: INTERNAL MEDICINE

## 2018-07-29 PROCEDURE — 36415 COLL VENOUS BLD VENIPUNCTURE: CPT | Performed by: HOSPITALIST

## 2018-07-29 PROCEDURE — 80053 COMPREHEN METABOLIC PANEL: CPT | Performed by: HOSPITALIST

## 2018-07-29 RX ADMIN — CLINDAMYCIN HYDROCHLORIDE 600 MG: 150 CAPSULE ORAL at 17:33

## 2018-07-29 RX ADMIN — NYSTATIN: 100000 POWDER TOPICAL at 17:34

## 2018-07-29 RX ADMIN — CLINDAMYCIN HYDROCHLORIDE 600 MG: 150 CAPSULE ORAL at 08:48

## 2018-07-29 RX ADMIN — MEMANTINE 10 MG: 10 TABLET ORAL at 17:33

## 2018-07-29 RX ADMIN — FAMOTIDINE 20 MG: 20 TABLET ORAL at 08:47

## 2018-07-29 RX ADMIN — DILTIAZEM HYDROCHLORIDE 120 MG: 120 CAPSULE, COATED, EXTENDED RELEASE ORAL at 08:49

## 2018-07-29 RX ADMIN — CARVEDILOL 3.12 MG: 3.12 TABLET, FILM COATED ORAL at 08:48

## 2018-07-29 RX ADMIN — ASPIRIN 81 MG 81 MG: 81 TABLET ORAL at 08:47

## 2018-07-29 RX ADMIN — CARVEDILOL 3.12 MG: 3.12 TABLET, FILM COATED ORAL at 17:33

## 2018-07-29 RX ADMIN — MEMANTINE 10 MG: 10 TABLET ORAL at 08:47

## 2018-07-29 RX ADMIN — NYSTATIN: 100000 POWDER TOPICAL at 08:50

## 2018-07-29 RX ADMIN — Medication 1 MG: at 21:29

## 2018-07-29 NOTE — PROGRESS NOTES
Problem: [address] (Adult/Pediatric) Goal: *STG: Remains safe in hospital 
Outcome: Progressing Towards Goal 
Pt is oriented to self. Calm and Cooperative. Appropriate and visible in the milieu. Attending groups. Mood is good. Meds/meal compliant. No PRNs given this shift. No behavioral issues. Denies suicidal and homicidal ideations. Denies auditory and visual hallucinations. Will continue to monitor pt with q15 min rounds for safety.

## 2018-07-29 NOTE — BH NOTES
Pt's P is 133-143. Dr. Edna Ng was paged. He ordered stat EKG. He was called back to report EKG result. He ordered hospitalist to see pt. Nursing supervisor has been notified.

## 2018-07-29 NOTE — PROGRESS NOTES
Hospitalist Consultation Note NAME:  Victoriano Nix  
:   1940 MRN:   [id number] ATTENDING: Alfredito Mock MD 
PCP:  Foreign Joe MD 
 
Date/Time:  2018 3:11 PM 
 
 
Recommendations/Plan:  
 
 
Tachycardia Labs unremarkable Pt asymptomatic EKG is NSR @ 69, Discussed with Dr. Morenita Contreras via phone. Will closely monitor for now. If pt develeops symptoms or hemodynamic instability, will do further work up Cont rest of medications Thanks for consult Subjective:  
REQUESTING PHYSICIAN: tachycardia via dynamap REASON FOR CONSULT:  Dr. Jayne Bradshaw Gladys is a 66 y.o. Alzheimers dementia, CHD, CAD, paroxymal A fib has been on BHU since . Pt is baseline and asymptomatic but noticed to have elevated HR on dynamap. EKG done shows NSR and labs are unremarkable. Pt is denies any complaints. No past medical history on file. No past surgical history on file. Social History Substance Use Topics  Smoking status: Not on file  Smokeless tobacco: Not on file  Alcohol use Not on file  
  
family history .+ HTN, heart disease Allergies Allergen Reactions  Cephalosporins Itching  Codeine Hives  Erythromycin Hives  Nubain [Nalbuphine] Hives  Pcn [Penicillins] Hives  Percodan [Oxycodone Hcl-Oxycodone-Asa] Hives  Promethazine Hives  Protonix [Pantoprazole] Diarrhea  Sulfa (Sulfonamide Antibiotics) Hives  Toradol [Ketorolac] Hives  Ultram [Tramadol] Hives  Valium [Diazepam] Hives  Prilosec [Omeprazole] Itching Prior to Admission medications Medication Sig Start Date End Date Taking? Authorizing Provider  
multivit-minerals/folic acid (WOMEN'S MULTIVITAMIN GUMMIES PO) Take 1 Tab by mouth daily. Historical Provider QUEtiapine (SEROQUEL) 25 mg tablet Take 12.5 mg by mouth daily. Historical Provider  
carvedilol (COREG) 3.125 mg tablet Take 3.125 mg by mouth two (2) times daily (with meals).     Historical Provider diclofenac (VOLTAREN) 1 % gel Apply 2 g to affected area every six (6) hours as needed (arthritis pain). Historical Provider  
acetaminophen (TYLENOL) 325 mg tablet Take 650 mg by mouth every eight (8) hours as needed for Pain. Historical Provider Menthol (ASPERCREME HEAT) 10 % gel by Apply Externally route every eight (8) hours as needed (arthritis). Historical Provider  
aspirin 81 mg chewable tablet Take 81 mg by mouth daily. Indications: myocardial infarction prevention    Historical Provider  
dilTIAZem XR (DILACOR XR) 120 mg XR capsule Take 120 mg by mouth daily. Indications: hypertension, VENTRICULAR RATE CONTROL IN ATRIAL FIBRILLATION    Historical Provider  
furosemide (LASIX) 20 mg tablet Take 20 mg by mouth daily as needed. Indications: Edema, hypertension    Historical Provider  
loratadine (CLARITIN) 10 mg tablet Take 10 mg by mouth daily as needed. Indications: Allergic Rhinitis, SNEEZING    Historical Provider  
famotidine (PEPCID) 20 mg tablet Take 20 mg by mouth daily. Indications: Heartburn    Historical Provider QUEtiapine (SEROQUEL) 25 mg tablet Take 25 mg by mouth nightly. Indications: Generalized Anxiety Disorder    Historical Provider REVIEW OF SYSTEMS:    
Total of 12 systems reviewed as follows:  
I am not able to complete the review of systems because: The patient is intubated and sedated The patient has altered mental status due to his acute medical problems The patient has baseline aphasia from prior stroke(s) The patient has baseline dementia and is not reliable historian POSITIVE= underlined text  Negative = text not underlined General:  fever, chills, sweats, generalized weakness, weight loss/gain,  
   loss of appetite Eyes:    blurred vision, eye pain, loss of vision, double vision ENT:    rhinorrhea, pharyngitis Respiratory:   cough, sputum production, SOB, wheezing, DICK, pleuritic pain  
Cardiology:   chest pain, palpitations, orthopnea, PND, edema, syncope Gastrointestinal:  abdominal pain , N/V, dysphagia, diarrhea, constipation, bleeding Genitourinary:  frequency, urgency, dysuria, hematuria, incontinence Muskuloskeletal :  arthralgia, myalgia Hematology:  easy bruising, nose or gum bleeding, lymphadenopathy Dermatological: rash, ulceration, pruritis Endocrine:   hot flashes or polydipsia Neurological:  headache, dizziness, confusion, focal weakness, paresthesia, Speech difficulties, memory loss, gait disturbance Psychological: Feelings of anxiety, depression, agitation Objective: VITALS:   
Visit Vitals  BP 92/57  Pulse (!) 130  Temp 98 °F (36.7 °C)  Resp 16  
 Ht 5' 1.25\" (1.556 m)  Wt 54.9 kg (121 lb)  SpO2 (!) 101%  Breastfeeding No  
 BMI 22.68 kg/m2 Temp (24hrs), Av.2 °F (36.8 °C), Min:98 °F (36.7 °C), Max:98.4 °F (36.9 °C) PHYSICAL EXAM:  
General:    no distress, appears stated age. HEENT: Atraumatic, anicteric sclerae, pink conjunctivae No oral ulcers, mucosa moist, throat clear Neck:  Supple, symmetrical,  thyroid: non tender Lungs:   Clear to auscultation bilaterally. No Wheezing or Rhonchi. No rales. Chest wall:  No tenderness  No Accessory muscle use. Heart:   Regular  rhythm,  No  murmur   No edema Abdomen:   Soft, non-tender. Not distended. Bowel sounds normal 
Extremities: No cyanosis. No clubbing Skin:     Not pale. Not Jaundiced  No rashes Psych:  Not depressed. Not anxious or agitated. Neurologic: Cn 2-12 grossly intact.  
 
_______________________________________________________________________ Care Plan discussed with: 
  Comments Patient x Family RN Care Manager Consultant:     
____________________________________________________________________ TOTAL TIME:  50   mins Comments   Reviewed previous records  
>50% of visit spent in counseling and coordination of care  Discussion with patient and/or family and questions answered Critical Care Provided     Minutes non procedure based 
________________________________________________________________________ Signed: Cristhian Mcpherson MD 
 
 
Procedures: see electronic medical records for all procedures/Xrays and details which were not copied into this note but were reviewed prior to creation of Plan. LAB DATA REVIEWED:   
Recent Results (from the past 24 hour(s)) CBC WITH AUTOMATED DIFF Collection Time: 07/29/18  1:30 PM  
Result Value Ref Range WBC 6.3 3.6 - 11.0 K/uL  
 RBC 4.16 3.80 - 5.20 M/uL  
 HGB 12.4 11.5 - 16.0 g/dL HCT 38.0 35.0 - 47.0 % MCV 91.3 80.0 - 99.0 FL  
 MCH 29.8 26.0 - 34.0 PG  
 MCHC 32.6 30.0 - 36.5 g/dL  
 RDW 14.2 11.5 - 14.5 % PLATELET 706 675 - 234 K/uL MPV 10.5 8.9 - 12.9 FL  
 NRBC 0.0 0  WBC ABSOLUTE NRBC 0.00 0.00 - 0.01 K/uL NEUTROPHILS 54 32 - 75 % LYMPHOCYTES 33 12 - 49 % MONOCYTES 9 5 - 13 % EOSINOPHILS 4 0 - 7 % BASOPHILS 0 0 - 1 % IMMATURE GRANULOCYTES 0 0.0 - 0.5 % ABS. NEUTROPHILS 3.4 1.8 - 8.0 K/UL  
 ABS. LYMPHOCYTES 2.1 0.8 - 3.5 K/UL  
 ABS. MONOCYTES 0.6 0.0 - 1.0 K/UL  
 ABS. EOSINOPHILS 0.2 0.0 - 0.4 K/UL  
 ABS. BASOPHILS 0.0 0.0 - 0.1 K/UL  
 ABS. IMM. GRANS. 0.0 0.00 - 0.04 K/UL  
 DF AUTOMATED METABOLIC PANEL, COMPREHENSIVE Collection Time: 07/29/18  1:30 PM  
Result Value Ref Range Sodium 144 136 - 145 mmol/L Potassium 4.2 3.5 - 5.1 mmol/L Chloride 108 97 - 108 mmol/L  
 CO2 30 21 - 32 mmol/L Anion gap 6 5 - 15 mmol/L Glucose 135 (H) 65 - 100 mg/dL BUN 21 (H) 6 - 20 MG/DL Creatinine 0.93 0.55 - 1.02 MG/DL  
 BUN/Creatinine ratio 23 (H) 12 - 20 GFR est AA >60 >60 ml/min/1.73m2 GFR est non-AA 58 (L) >60 ml/min/1.73m2 Calcium 8.5 8.5 - 10.1 MG/DL Bilirubin, total 0.3 0.2 - 1.0 MG/DL  
 ALT (SGPT) 14 12 - 78 U/L  
 AST (SGOT) 17 15 - 37 U/L Alk. phosphatase 101 45 - 117 U/L  Protein, total 5.9 (L) 6.4 - 8.2 g/dL  
 Albumin 2.7 (L) 3.5 - 5.0 g/dL Globulin 3.2 2.0 - 4.0 g/dL A-G Ratio 0.8 (L) 1.1 - 2.2 MAGNESIUM Collection Time: 07/29/18  1:30 PM  
Result Value Ref Range Magnesium 1.7 1.6 - 2.4 mg/dL  
 
 
_____________________________ Hospitalist: Vini Kathleen MD

## 2018-07-29 NOTE — PROGRESS NOTES
Follow Up Nutrition Assessment: 
   
INTERVENTIONS/RECOMMENDATIONS: · Meals/Snacks: General/healthful diet:  Continue regular diet. 
   
ASSESSMENT:  
7/29: [facility] reviewed; med noted for dementia on admission.  Plans are nursing home placement,  Tolerating regular diet.  Gamal diet. 
   
Diet Order: Regular 
% Eaten:  100%.    
Pertinent Medications: [x]Reviewed []Other Pertinent Labs: [x]Reviewed []Other Food Allergies: [x]None []Other Last BM:                     [x]Active     []Hyperactive  []Hypoactive       [] Absent BS Skin:               [x] Intact                        [] Incision                     [] Breakdown                                    [] Other: 
   
Anthropometrics:  
Height: 5' 1.25\" (155.6 cm)              Weight: 54.6 kg (120 lb 4.8 oz)                    
IBW (%IBW):   ( )                  UBW (%UBW):   (  %) Last Weight Metrics: 
Weight Loss Metrics 7/29/2018 6/17/2018 Today's Wt 121 lb  -  
BMI - 22.55 kg/m2  
   
   
BMI: Body mass index is 22.55 kg/(m^2).                   LQJW BMI is indicative of: 
 []Underweight    [x]Normal    []Overweight    [] Obesity   [] Extreme Obesity (BMI>40)  
   
Estimated Nutrition Needs (Based on):  
1171 Kcals/day (BMR (976) x 1.2AF) , 55 g (1.0 g/kg bw) Protein Carbohydrate: At Least 130 g/day  Fluids: 1200 mL/day (1ml/kcal) 
   
NUTRITION DIAGNOSES:  
Problem:  No nutritional diagnosis at this time     
Etiology: related to      
Signs/Symptoms: as evidenced by     
   
NUTRITION INTERVENTIONS: 
Meals/Snacks: General/healthful diet              
   
GOAL:  
PO intake >50% of meals next 5-7 days.  Previous goal met. 
   
LEARNING NEEDS (Diet, Food/Nutrient-Drug Interaction):  
 [x] None Identified 
 [] Identified and Education Provided/Documented 
 [] Identified and Pt declined/was not appropriate 
   
Cultureal, Bahai, OR Ethnic Dietary Needs:  
 [x] None Identified 
 [] Identified and Addressed 
   
 [x] Interdisciplinary Care Plan Reviewed/Documented  
 [x] Discharge Planning:   Continue regular diet 
   
MONITORING /EVALUATION:  
Food/Nutrient Intake Outcomes: Total energy intake Physical Signs/Symptoms Outcomes: Weight/weight change 
   
NUTRITION RISK:  
 [] Patient At Nutritional Risk 
                       [] High              [] Moderate/Mild           []  Low                                     
 [x] Patient Not At Nutritional Risk 
   
PT SEEN FOR:  
 []  MD Consult:           []Calorie Count                                                                   []Diabetic Diet Education                                                                 
                                                                  []HJQK Education 
                                                                  []Electrolyte Management 
                                                                  [x]General Nutrition Management and Supplements                                                                   [][id number] of Tube Feeding 
                                                                  []TPN Recommendations  
 []  RN Referral:                       []MST score >=2 
                                                                  []Enteral/Parenteral Nutrition PTA 
                                                                  []Pregnant: Gestational DM or Multigestation 
                                                                  []Pressure Ulcer/Wound Care needs 
   
[]  Low BMI

## 2018-07-29 NOTE — PROGRESS NOTES
Problem: [address] 89 (Adult/Pediatric) Goal: *STG: Remains safe in hospital 
Outcome: Progressing Towards Goal 
Pt slept 5 hours. Pt had uneventful night and required no PRN's. Pt had no complaints or signs of distress throughout night. Respirations were unlabored. Continuing to monitor with q15 min rounds for safety.

## 2018-07-29 NOTE — BH NOTES
PSYCHIATRIC PROGRESS NOTE Patient Name  Jordan Jennings  
Date of Birth 1940 [facility] [id number] Medical Record Number  [id number] Age  66 y.o. PCP Berenice Spears, MD  
Admit date:  6/17/2018 Room Number  322/01  @ [facility]  
Date of Service  7/28/2018 E & M PROGRESS NOTE: 
  
 
 
HISTORY  
   
CC:  \"confused, memory impairment\" HISTORY OF PRESENT ILLNESS/INTERVAL HISTORY:  (reviewed/updated 7/28/2018). per initial evaluation: The patient, Jordan Jennings, is a 68 y.o. WHITE [place]  female with a past psychiatric history significant for dementia, who presents at this time with complaints of (and/or evidence of) the following emotional symptoms: memory impairment. Additional symptomatology include disoriented to time and place, poor memory and behavior issues. pt has been incontent and need assistance with ADLs. She is irritable and easily gets loud and labile in mood. She was dc to medical unit for tx of hypotension and now back on U for further tx  The above symptoms have been present for years. These symptoms are of severe severity. These symptoms are constant  in nature. Jordan Jennings presents/reports/evidences the following emotional symptoms today, 7/28/2018:memory impairment. The above symptoms have been present for years. These symptoms are of moderate severity. The symptoms are constant  in nature. Additional symptomatology and features include confused , disoriented to place and time. Pt is now accepting her med and need assistance with ADLs due to incontinence. No aggressive behavior and affect is bright , sleep is improving, pleasant and co operative. Received no prn. 
6/25/18 she is compliant with medications and no anger issues like before and she gets confused and disoriented 6/26/18 she is doing better and mood is better and no anger issues and no SI or HI and slept well last night 6/27/18 she is the same and she is not engaging well and she has no anger issues and slept fairly last night but she was sleepy this morning and not engaging 6/28- remains confused with memory impairment. Pt has been hallucinating and paranoid- hiding behind her bed stating a women has been raped and stabbed. Received prn med. Sleep is better 6/29- episode of paranoid and mostly sun downing. Appear pleasant in the morning and no recollection of night time paranoia. unsteady gait due to sedation 6/30- pleasant and confused during the daytime, eating her breakfast. Still has paranoid behavior at night 7/1- slow progress as less sundowning and less paranoid. Pleasantly confused during the day time with no sig behavior problem 7/2- confused and disoriented with poor memory. Sun downing and worse in the evening with paranoia. 7/3- paranoid at night about a man coming to her room and killing. Fearful but pt is calm, pleasantly confused during daytime. Accepting med and no aggressive behavior 7/4- no sig behavior issue overnight and sleep is improved. Accepting med. Pleasant confused and with memory impairment 7/5- sleep is poor and remains paranoid at night seeing dead people. Need assistance with ADLs. No behavior issue 7/6- disturbed sleep last nigh when code s was called due to pt not responding- later cancelled as pt's vital were within normal and cxr was wnl to rule out aspiration. Pt is currently in bed responding to command and appear to be in no distress. Poor memory and attention 7/7-Pt was seen in her room with the RN. Presents alert, verbal, oriented to self only. Affect is flat. States she is feeling fine. Prn's used-Ambien 5 mg. No AVH or SI/HI. 7/8- Pt is incontinent of bowel and bladder, becomes irritable during ADL care. No significant changes from yesterday. Awaiting placement. 7/9- remains confused and disoriented to time and place. Pleasant and sleep is better. No behavior problem. Accepting med.   
7/10- remains pleasant and confused with no behavior problem. Accepting med. Sleep is better. Poor memory and disoriented to time/place. 7/11- pleasant and co operative during the interview. She remains confused with poor memory. No delusional themes and sleep is improving. 
7/12- pleasant and co operative. Remains disoriented, confused with poor memory. Slept well without prn med. No paranoid ideation 7/13- pleasant and confused. No behavior issue. Likes to color during group activities. Accepting med. Sleep is better 7/14- no behavior problem overnight. pleasant and with memory impairment. Need total care, bowel and bladder incontinent 7/15- no sig change in presentation, no behavior problem. Ready for placement. Memory impairment 7/16- poor memory, need total care, sleep is better and no behavior issue. 7/17- irritable when attended for ADLS/incontinence of bladder and bowel. Overall no physical aggression and pleasant and confuse. Accepting med 7/18- no sig behavior issue overnight. Accepting med and sleep is  Better. Pleasant and with memory impairment 7/19- stable presentation with brighter affect and memory impairment. Likes coloring . Sleep is erratic but appear rested. Accepting med. Need total care 7/20- no sig change in presentation. Remains confused with memory impairment. No behavior issue 7/21/18: seen today in her room, she was laying in bed, staff reported she did not eat her breakfast,no major behavioral issues,confuse due to cognitive impairment. Accpting med. 
7/22/18: No significant change noted,accepting po medications, sleep and appetite is good, no agitation or aggression. 7/23/18- Ms. Kurt Mota presents to rounds with flat affect, but engaging and communicative. Pleasant. Eating meals. Stable behavior. 7/24- pleasant and co operative. Appropriate smile and remains confused with poor memory. No behavior issue. 7/25- no sig behavior issue. No agitation and accepting med. Pleasant and confused. Stated she is at [facility]". OX1 
7/26- no sig change in presentation. Need total care and memory impairment. Accepting med 7/27- no behavior problem, pleasant and confused. Seen by IM for diaper rash. No psychosis 7/28-Presents pleasant and confused (Dementia). Pt had an episode of choking, was seen by Medical. Diet changed to Mechanical soft. SIDE EFFECTS: (reviewed/updated 7/28/2018) None reported or admitted to. No noted toxicity with use of current med ALLERGIES:(reviewed/updated 7/28/2018) Allergies Allergen Reactions  Cephalosporins Itching  Codeine Hives  Erythromycin Hives  Nubain [Nalbuphine] Hives  Pcn [Penicillins] Hives  Percodan [Oxycodone Hcl-Oxycodone-Asa] Hives  Promethazine Hives  Protonix [Pantoprazole] Diarrhea  Sulfa (Sulfonamide Antibiotics) Hives  Toradol [Ketorolac] Hives  Ultram [Tramadol] Hives  Valium [Diazepam] Hives  Prilosec [Omeprazole] Itching MEDICATIONS PRIOR TO ADMISSION:(reviewed/updated 7/28/2018) Prescriptions Prior to Admission Medication Sig  
 multivit-minerals/folic acid (WOMEN'S MULTIVITAMIN GUMMIES PO) Take 1 Tab by mouth daily.  QUEtiapine (SEROQUEL) 25 mg tablet Take 12.5 mg by mouth daily.  carvedilol (COREG) 3.125 mg tablet Take 3.125 mg by mouth two (2) times daily (with meals).  diclofenac (VOLTAREN) 1 % gel Apply 2 g to affected area every six (6) hours as needed (arthritis pain).  acetaminophen (TYLENOL) 325 mg tablet Take 650 mg by mouth every eight (8) hours as needed for Pain.  Menthol (ASPERCREME HEAT) 10 % gel by Apply Externally route every eight (8) hours as needed (arthritis).  aspirin 81 mg chewable tablet Take 81 mg by mouth daily. Indications: myocardial infarction prevention  dilTIAZem XR (DILACOR XR) 120 mg XR capsule Take 120 mg by mouth daily. Indications: hypertension, VENTRICULAR RATE CONTROL IN ATRIAL FIBRILLATION  furosemide (LASIX) 20 mg tablet Take 20 mg by mouth daily as needed. Indications: Edema, hypertension  loratadine (CLARITIN) 10 mg tablet Take 10 mg by mouth daily as needed. Indications: Allergic Rhinitis, SNEEZING  
 famotidine (PEPCID) 20 mg tablet Take 20 mg by mouth daily. Indications: Heartburn  QUEtiapine (SEROQUEL) 25 mg tablet Take 25 mg by mouth nightly. Indications: Generalized Anxiety Disorder PAST MEDICAL HISTORY: Past medical history from the initial psychiatric evaluation has been reviewed (reviewed/updated 7/28/2018) with no additional updates (I asked patient and no additional past medical history provided). No past medical history on file. No past surgical history on file. SOCIAL HISTORY: Social history from the initial psychiatric evaluation has been reviewed (reviewed/updated 7/28/2018) with no additional updates (I asked patient and no additional social history provided). Social History Social History  Marital status: SINGLE Spouse name: N/A  
 Number of children: N/A  
 Years of education: N/A Occupational History  Not on file. Social History Main Topics  Smoking status: Not on file  Smokeless tobacco: Not on file  Alcohol use Not on file  Drug use: Not on file  Sexual activity: Not on file Other Topics Concern  Not on file Social History Narrative FAMILY HISTORY: Family history from the initial psychiatric evaluation has been reviewed (reviewed/updated 7/28/2018) with no additional updates (I asked patient and no additional family history provided). No family history on file. REVIEW OF SYSTEMS: (reviewed/updated 7/28/2018) Appetite:poor Sleep: decreased more than normal  
All other Review of Systems: Psychological ROS: positive for - behavioral disorder, concentration difficulties, disorientation and memory difficulties [facility] MENTAL STATUS EXAM (MSE): MSE FINDINGS ARE WITHIN NORMAL LIMITS (WNL) UNLESS OTHERWISE STATED BELOW. ( ALL OF THE BELOW CATEGORIES OF THE MSE HAVE BEEN REVIEWED (reviewed 7/28/2018) AND UPDATED AS DEEMED APPROPRIATE ) General Presentation age appropriate and casually dressed, unreliable and vague Orientation disorganized Vital Signs  See below (reviewed 7/28/2018); Vital Signs (BP, Pulse, & Temp) are within normal limits if not listed below. Gait and Station Stable/steady, no ataxia Musculoskeletal System No extrapyramidal symptoms (EPS); no abnormal muscular movements or Tardive Dyskinesia (TD); muscle strength and tone are within normal limits Language No aphasia or dysarthria Speech:  hypoverbal  
Thought Processes illogical; slow rate of thoughts; poor abstract reasoning/computation Thought Associations tangential  
Thought Content free of delusions, free of hallucinations and preoccupations Suicidal Ideations none Homicidal Ideations none Mood:  Less anxious Affect:  anxious and increased in intensity Memory recent  impaired Memory remote:  impaired Concentration/Attention:  distractable Fund of Knowledge below average Insight:  poor Reliability poor Judgment:  poor VITALS:    
Patient Vitals for the past 24 hrs: 
 Temp Pulse Resp BP SpO2  
07/28/18 2009 98.4 °F (36.9 °C) 93 - (!) 135/95 (!) 101 %  
07/28/18 1633 - 77 - 140/65 -  
07/28/18 1459 98.5 °F (36.9 °C) 68 - 144/58 -  
07/28/18 1315 97.9 °F (36.6 °C) 69 16 135/77 - Wt Readings from Last 3 Encounters:  
07/17/18 54.9 kg (121 lb) Temp Readings from Last 3 Encounters:  
07/28/18 98.4 °F (36.9 °C)  
06/17/18 97.6 °F (36.4 °C)  
06/15/18 97.6 °F (36.4 °C) BP Readings from Last 3 Encounters:  
07/28/18 (!) 135/95  
06/17/18 102/47  
06/16/18 99/55 Pulse Readings from Last 3 Encounters:  
07/28/18 93  
06/17/18 70  
06/16/18 81 DATA LABORATORY DATA:(reviewed/updated 7/28/2018) No results found for this or any previous visit (from the past 24 hour(s)).  
No results found for: Edna Citron, [address], VALPR, DS6, CRBAM, CRBAMP, CARB2, XCRBAM 
No results found for: LITHM  
RADIOLOGY REPORTS:(reviewed/updated 7/28/2018) Xr Chest [facility] Result Date: 6/17/2018 EXAM:  XR CHEST PORT INDICATION:  Chest Pain COMPARISON:  None. FINDINGS: A portable AP radiograph of the chest was obtained at 720 hours. The patient is on a cardiac monitor. There is minor right basilar atelectasis. There is question of a vague right perihilar airspace process. There is slight diffuse interstitial prominence. Heart size is borderline enlarged. Patient status post median sternotomy. IMPRESSION: 1. There is suggestion of a vague right perihilar airspace process and follow-up studies would be helpful to assess for interval change. There is minor right basilar atelectasis. MEDICATIONS ALL MEDICATIONS:  
Current Facility-Administered Medications Medication Dose Route Frequency  clindamycin (CLEOCIN) capsule 600 mg  600 mg Oral BID  nystatin (MYCOSTATIN) 100,000 unit/gram powder   Topical BID  dilTIAZem CD (CARDIZEM CD) capsule 120 mg  120 mg Oral DAILY  risperiDONE (RisperDAL) 1 mg/mL oral solution soln 1 mg  1 mg Oral QHS  memantine (NAMENDA) tablet 10 mg  10 mg Oral BID  ziprasidone (GEODON) 10 mg in sterile water (preservative free) 0.5 mL injection  10 mg IntraMUSCular BID PRN  
 benztropine (COGENTIN) tablet 1 mg  1 mg Oral BID PRN  
 benztropine (COGENTIN) injection 1 mg  1 mg IntraMUSCular BID PRN  
 acetaminophen (TYLENOL) tablet 650 mg  650 mg Oral Q4H PRN  
 magnesium hydroxide (MILK OF MAGNESIA) 400 mg/5 mL oral suspension 30 mL  30 mL Oral DAILY PRN  
 nicotine (NICODERM CQ) 21 mg/24 hr patch 1 Patch  1 Patch TransDERmal DAILY PRN  
 aspirin chewable tablet 81 mg  81 mg Oral DAILY  famotidine (PEPCID) tablet 20 mg  20 mg Oral DAILY  carvedilol (COREG) tablet 3.125 mg  3.125 mg Oral BID WITH MEALS  
  
SCHEDULED MEDICATIONS:  
Current Facility-Administered Medications Medication Dose Route Frequency  clindamycin (CLEOCIN) capsule 600 mg  600 mg Oral BID  nystatin (MYCOSTATIN) 100,000 unit/gram powder   Topical BID  dilTIAZem CD (CARDIZEM CD) capsule 120 mg  120 mg Oral DAILY  risperiDONE (RisperDAL) 1 mg/mL oral solution soln 1 mg  1 mg Oral QHS  memantine (NAMENDA) tablet 10 mg  10 mg Oral BID  aspirin chewable tablet 81 mg  81 mg Oral DAILY  famotidine (PEPCID) tablet 20 mg  20 mg Oral DAILY  carvedilol (COREG) tablet 3.125 mg  3.125 mg Oral BID WITH MEALS  
  
 
 
ASSESSMENT & PLAN  
 
DIAGNOSES REQUIRING ACTIVE TREATMENT AND MONITORING: (reviewed/updated 7/28/2018) Patient Active Hospital Problem List: 
 Dementia (6/17/2018) Assessment: moderate to severe- disoriented, poor memory and irritable. Need total care. No behavior problem and accepting med Plan:  Namenda. Risperdal 
 
Hypotension A: low bp 
P:ct with current tx-IM to follow 7/26-: ct with current medications. Placement. 7/27- ct with medication- accepted by [facility] In summary, Mohsen Lopez, is a 66 y.o.  female who presents with a severe exacerbation of the principal diagnosis of Dementia Patient's condition is /not stable . Patient requires continued inpatient hospitalization for further stabilization, safety monitoring and medication management. I will continue to coordinate the provision of individual, milieu, occupational, group, and substance abuse therapies to address target symptoms/diagnoses as deemed appropriate for the individual patient. A coordinated, multidisplinary treatment team round was conducted with the patient (this team consists of the nurse, psychiatric unit pharmcist,  and writer). Complete current electronic health record for patient has been reviewed today including consultant notes, ancillary staff notes, nurses and psychiatric tech notes. Suicide risk assessment completed and patient deemed to be of low risk for suicide at this time. The following regarding medications was addressed during rounds with patient:  
the risks and benefits of the proposed medication. The patient was given the opportunity to ask questions. Informed consent given to the use of the above medications. Will continue to adjust psychiatric and non-psychiatric medications (see above \"medication\" section and orders section for details) as deemed appropriate & based upon diagnoses and response to treatment. I will continue to order blood tests/labs and diagnostic tests as deemed appropriate and review results as they become available (see orders for details and above listed lab/test results). I will order psychiatric records from previous [facility] hospitals to further elucidate the nature of patient's psychopathology and review once available. I will gather additional collateral information from friends, family and o/p treatment team to further elucidate the nature of patient's psychopathology and baselline level of psychiatric functioning. I certify that this patient's inpatient psychiatric hospital services furnished since the previous certification were, and continue to be, required for treatment that could reasonably be expected to improve the patient's condition, or for diagnostic study, and that the patient continues to need, on a daily basis, active treatment furnished directly by or requiring the supervision of inpatient psychiatric facility personnel. In addition, the hospital records show that services furnished were intensive treatment services, admission or related services, or equivalent services. EXPECTED DISCHARGE DATE/DAY: Monday DISPOSITION: Home/detention Signed By:  
Alberto Joel MD 
7/28/2018

## 2018-07-29 NOTE — BH NOTES
Hospitalist just came and saw pt. Nursing supervisor has been contacted and agreed to help draw labs.

## 2018-07-30 ENCOUNTER — APPOINTMENT (OUTPATIENT)
Dept: GENERAL RADIOLOGY | Age: 78
DRG: 056 | End: 2018-07-30
Attending: INTERNAL MEDICINE
Payer: MEDICARE

## 2018-07-30 VITALS
SYSTOLIC BLOOD PRESSURE: 132 MMHG | HEART RATE: 69 BPM | HEIGHT: 61 IN | OXYGEN SATURATION: 96 % | BODY MASS INDEX: 22.84 KG/M2 | DIASTOLIC BLOOD PRESSURE: 57 MMHG | WEIGHT: 121 LBS | TEMPERATURE: 97.3 F | RESPIRATION RATE: 16 BRPM

## 2018-07-30 PROCEDURE — 74011250637 HC RX REV CODE- 250/637: Performed by: PSYCHIATRY & NEUROLOGY

## 2018-07-30 PROCEDURE — 71045 X-RAY EXAM CHEST 1 VIEW: CPT

## 2018-07-30 PROCEDURE — 74011250637 HC RX REV CODE- 250/637: Performed by: INTERNAL MEDICINE

## 2018-07-30 RX ORDER — CARVEDILOL 3.12 MG/1
3.12 TABLET ORAL 2 TIMES DAILY WITH MEALS
Qty: 28 TAB | Refills: 0 | Status: SHIPPED | OUTPATIENT
Start: 2018-07-30 | End: 2018-08-13

## 2018-07-30 RX ORDER — GUAIFENESIN 100 MG/5ML
81 LIQUID (ML) ORAL DAILY
Qty: 14 TAB | Refills: 0 | Status: SHIPPED | OUTPATIENT
Start: 2018-07-31 | End: 2018-08-14

## 2018-07-30 RX ORDER — NYSTATIN 100000 [USP'U]/G
POWDER TOPICAL 2 TIMES DAILY
Qty: 15 G | Refills: 0 | Status: SHIPPED | OUTPATIENT
Start: 2018-07-30 | End: 2018-08-13

## 2018-07-30 RX ORDER — MEMANTINE HYDROCHLORIDE 10 MG/1
10 TABLET ORAL 2 TIMES DAILY
Qty: 28 TAB | Refills: 0 | Status: SHIPPED | OUTPATIENT
Start: 2018-07-30 | End: 2018-08-13

## 2018-07-30 RX ORDER — RISPERIDONE 1 MG/ML
1 SOLUTION ORAL
Qty: 14 ML | Refills: 0 | Status: SHIPPED | OUTPATIENT
Start: 2018-07-30 | End: 2018-08-13

## 2018-07-30 RX ORDER — FAMOTIDINE 20 MG/1
20 TABLET, FILM COATED ORAL DAILY
Qty: 14 TAB | Refills: 0 | Status: SHIPPED | OUTPATIENT
Start: 2018-07-31 | End: 2018-08-14

## 2018-07-30 RX ORDER — DILTIAZEM HYDROCHLORIDE 120 MG/1
120 CAPSULE, COATED, EXTENDED RELEASE ORAL DAILY
Qty: 14 CAP | Refills: 0 | Status: SHIPPED | OUTPATIENT
Start: 2018-07-31 | End: 2018-08-14

## 2018-07-30 RX ADMIN — CLINDAMYCIN HYDROCHLORIDE 600 MG: 150 CAPSULE ORAL at 09:13

## 2018-07-30 RX ADMIN — MEMANTINE 10 MG: 10 TABLET ORAL at 09:14

## 2018-07-30 RX ADMIN — DILTIAZEM HYDROCHLORIDE 120 MG: 120 CAPSULE, COATED, EXTENDED RELEASE ORAL at 09:14

## 2018-07-30 RX ADMIN — CARVEDILOL 3.12 MG: 3.12 TABLET, FILM COATED ORAL at 09:13

## 2018-07-30 RX ADMIN — FAMOTIDINE 20 MG: 20 TABLET ORAL at 09:13

## 2018-07-30 RX ADMIN — ASPIRIN 81 MG 81 MG: 81 TABLET ORAL at 09:13
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
## 2018-07-30 NOTE — PROGRESS NOTES
Problem: [address] (Adult/Pediatric) Goal: *STG: Remains safe in hospital 
Outcome: Progressing Towards Goal 
Pt was visible in the day room. Pt was alert and ambulating on the unit independently. Pt engaged became irritable at bedtime requiring more direction. Incontinence care provided and linen changed. Staff will continue to support and monitor for health and safety.

## 2018-07-30 NOTE — PROGRESS NOTES
Problem: [address] (Adult/Pediatric) Goal: *STG: Remains safe in hospital 
Outcome: Progressing Towards Goal 
Pt slept 6 1/2 hours. No med/beh concerns overnight. Staff will continue to monitor for health and safety.

## 2018-07-30 NOTE — PROGRESS NOTES
General Daily Progress Note Admit Date: 6/17/2018 Subjective:  
 
Patient ha elevated HR earlier but this has since normalized. She continues to spit up large sputum and is noted to be aspirating. Current Facility-Administered Medications Medication Dose Route Frequency  clindamycin (CLEOCIN) capsule 600 mg  600 mg Oral BID  nystatin (MYCOSTATIN) 100,000 unit/gram powder   Topical BID  dilTIAZem CD (CARDIZEM CD) capsule 120 mg  120 mg Oral DAILY  risperiDONE (RisperDAL) 1 mg/mL oral solution soln 1 mg  1 mg Oral QHS  memantine (NAMENDA) tablet 10 mg  10 mg Oral BID  ziprasidone (GEODON) 10 mg in sterile water (preservative free) 0.5 mL injection  10 mg IntraMUSCular BID PRN  
 benztropine (COGENTIN) tablet 1 mg  1 mg Oral BID PRN  
 benztropine (COGENTIN) injection 1 mg  1 mg IntraMUSCular BID PRN  
 acetaminophen (TYLENOL) tablet 650 mg  650 mg Oral Q4H PRN  
 magnesium hydroxide (MILK OF MAGNESIA) 400 mg/5 mL oral suspension 30 mL  30 mL Oral DAILY PRN  
 nicotine (NICODERM CQ) 21 mg/24 hr patch 1 Patch  1 Patch TransDERmal DAILY PRN  
 aspirin chewable tablet 81 mg  81 mg Oral DAILY  famotidine (PEPCID) tablet 20 mg  20 mg Oral DAILY  carvedilol (COREG) tablet 3.125 mg  3.125 mg Oral BID WITH MEALS Objective:  
 
Patient Vitals for the past 8 hrs: 
 BP Temp Pulse Resp SpO2  
07/29/18 2000 128/54 96.3 °F (35.7 °C) 66 16 96 %  
07/29/18 1631 97/47 - 69 16 - Physical Exam:  
Visit Vitals  /54  Pulse 66  Temp 96.3 °F (35.7 °C)  Resp 16  
 Ht 5' 1.25\" (1.556 m)  Wt 54.9 kg (121 lb)  SpO2 96%  Breastfeeding No  
 BMI 22.68 kg/m2 General:  Alert, cooperative, no distress, appears stated age. Head:  Normocephalic, without obvious abnormality, atraumatic. Eyes:  Conjunctivae/corneas clear. PERRL, EOMs intact. Lungs:   Clear to auscultation bilaterally. Chest wall:  No tenderness or deformity.   
Heart: Regular rate and rhythm, S1, S2 normal, no murmur, click, rub or gallop. Abdomen:   Soft, non-tender. Bowel sounds normal. No masses,  No organomegaly. Extremities: Extremities normal, atraumatic, no cyanosis or edema. Pulses: 2+ and symmetric all extremities. Skin: Skin color, texture, turgor normal. No rashes or lesions Recent Results (from the past 24 hour(s)) CBC WITH AUTOMATED DIFF Collection Time: 07/29/18  1:30 PM  
Result Value Ref Range WBC 6.3 3.6 - 11.0 K/uL  
 RBC 4.16 3.80 - 5.20 M/uL  
 HGB 12.4 11.5 - 16.0 g/dL HCT 38.0 35.0 - 47.0 % MCV 91.3 80.0 - 99.0 FL  
 MCH 29.8 26.0 - 34.0 PG  
 MCHC 32.6 30.0 - 36.5 g/dL  
 RDW 14.2 11.5 - 14.5 % PLATELET 883 798 - 596 K/uL MPV 10.5 8.9 - 12.9 FL  
 NRBC 0.0 0  WBC ABSOLUTE NRBC 0.00 0.00 - 0.01 K/uL NEUTROPHILS 54 32 - 75 % LYMPHOCYTES 33 12 - 49 % MONOCYTES 9 5 - 13 % EOSINOPHILS 4 0 - 7 % BASOPHILS 0 0 - 1 % IMMATURE GRANULOCYTES 0 0.0 - 0.5 % ABS. NEUTROPHILS 3.4 1.8 - 8.0 K/UL  
 ABS. LYMPHOCYTES 2.1 0.8 - 3.5 K/UL  
 ABS. MONOCYTES 0.6 0.0 - 1.0 K/UL  
 ABS. EOSINOPHILS 0.2 0.0 - 0.4 K/UL  
 ABS. BASOPHILS 0.0 0.0 - 0.1 K/UL  
 ABS. IMM. GRANS. 0.0 0.00 - 0.04 K/UL  
 DF AUTOMATED METABOLIC PANEL, COMPREHENSIVE Collection Time: 07/29/18  1:30 PM  
Result Value Ref Range Sodium 144 136 - 145 mmol/L Potassium 4.2 3.5 - 5.1 mmol/L Chloride 108 97 - 108 mmol/L  
 CO2 30 21 - 32 mmol/L Anion gap 6 5 - 15 mmol/L Glucose 135 (H) 65 - 100 mg/dL BUN 21 (H) 6 - 20 MG/DL Creatinine 0.93 0.55 - 1.02 MG/DL  
 BUN/Creatinine ratio 23 (H) 12 - 20 GFR est AA >60 >60 ml/min/1.73m2 GFR est non-AA 58 (L) >60 ml/min/1.73m2 Calcium 8.5 8.5 - 10.1 MG/DL Bilirubin, total 0.3 0.2 - 1.0 MG/DL  
 ALT (SGPT) 14 12 - 78 U/L  
 AST (SGOT) 17 15 - 37 U/L Alk. phosphatase 101 45 - 117 U/L Protein, total 5.9 (L) 6.4 - 8.2 g/dL  Albumin 2.7 (L) 3.5 - 5.0 g/dL  
 Globulin 3.2 2.0 - 4.0 g/dL A-G Ratio 0.8 (L) 1.1 - 2.2 MAGNESIUM Collection Time: 07/29/18  1:30 PM  
Result Value Ref Range Magnesium 1.7 1.6 - 2.4 mg/dL Assessment:  
 
Principal Problem: 
  Dementia (6/17/2018) Aspiation with bronchitis and/or pneumonia Plan:  
 
Repeat CXR am. 
 
Labs reviewed.

## 2018-07-30 NOTE — DISCHARGE INSTRUCTIONS
DISCHARGE SUMMARY from Nurse    The following personal items are in your possession at time of discharge:    Dental Appliances: None  Visual Aid: None     Home Medications: None     Clothing: None  Other Valuables: None  Personal Items Sent to Safe: none      PATIENT INSTRUCTIONS:    If I feel that I can not keep these promises and I am at risk of hurting myself or others, I will call the crisis office and speak with a crisis worker who will assist me during my crisis. [facility] Crisis  [address]  [address]       Lifestyle Tips:  Appointment after discharge and follow up phone call:   After being discharged, if your appointment does not work for you, please feel free to contact the physician's office to change the appointment. Medications: Take your medicines exactly as instructed. Ask your doctor or nurse if you have questions about your medicines. Tell your doctor right away if you have problems with your medicines. These are general instructions for a healthy lifestyle:    No smoking/ No tobacco products/ Avoid exposure to second hand smoke    Surgeon General's Warning:  Quitting smoking now greatly reduces serious risk to your health. Obesity, smoking, and sedentary lifestyle greatly increases your risk for illness    A healthy diet, regular physical exercise & weight monitoring are important for maintaining a healthy lifestyle    Recognize signs and symptoms of STROKE:    F-face looks uneven    A-arms unable to move or move unevenly    S-speech slurred or non-existent    T-time-call 911 as soon as signs and symptoms begin-DO NOT go       Back to bed or wait to see if you get better-TIME IS BRAIN. Warning Signs of HEART ATTACK     Call 911 if you have these symptoms:   Chest discomfort.  Most heart attacks involve discomfort in the center of the chest that lasts more than a few minutes, or that goes away and comes back. It can feel like uncomfortable pressure, squeezing, fullness, or pain.  Discomfort in other areas of the upper body. Symptoms can include pain or discomfort in one or both arms, the back, neck, jaw, or stomach.  Shortness of breath with or without chest discomfort.  Other signs may include breaking out in a cold sweat, nausea, or lightheadedness. Don't wait more than five minutes to call 911 - MINUTES MATTER! Fast action can save your life. Calling 911 is almost always the fastest way to get lifesaving treatment. Emergency Medical Services staff can begin treatment when they arrive -- up to an hour sooner than if someone gets to the hospital by car. Myself and/or my family have received education about my diagnosis throughout my hospital stay. During my hospital stay, I and/or my family/caregiver were included in planning my care upon discharge. My needs were taken into consideration and I was included in my discharge planning. I had an opportunity to ask questions. The discharge information has been reviewed with the patient. The patient verbalized understanding. Discharge medications reviewed with the patient and appropriate educational materials and side effects teaching were provided.

## 2018-07-30 NOTE — BH NOTES
PSYCHIATRIC PROGRESS NOTE Patient Name  Pedro Schmid  
Date of Birth 1940 [facility] [id number] Medical Record Number  [id number] Age  66 y.o. PCP Berenice Spears, MD  
Admit date:  6/17/2018 Room Number  322/01  @ [facility]  
Date of Service  7/29/2018 E & M PROGRESS NOTE: 
  
 
 
HISTORY  
   
CC:  \"confused, memory impairment\" HISTORY OF PRESENT ILLNESS/INTERVAL HISTORY:  (reviewed/updated 7/29/2018). per initial evaluation: The patient, Pedro Schmid, is a 68 y.o. WHITE [place]  female with a past psychiatric history significant for dementia, who presents at this time with complaints of (and/or evidence of) the following emotional symptoms: memory impairment. Additional symptomatology include disoriented to time and place, poor memory and behavior issues. pt has been incontent and need assistance with ADLs. She is irritable and easily gets loud and labile in mood. She was dc to medical unit for tx of hypotension and now back on U for further tx  The above symptoms have been present for years. These symptoms are of severe severity. These symptoms are constant  in nature. Pedro Schmid presents/reports/evidences the following emotional symptoms today, 7/29/2018:memory impairment. The above symptoms have been present for years. These symptoms are of moderate severity. The symptoms are constant  in nature. Additional symptomatology and features include confused , disoriented to place and time. Pt is now accepting her med and need assistance with ADLs due to incontinence. No aggressive behavior and affect is bright , sleep is improving, pleasant and co operative. Received no prn. 
6/25/18 she is compliant with medications and no anger issues like before and she gets confused and disoriented 6/26/18 she is doing better and mood is better and no anger issues and no SI or HI and slept well last night 6/27/18 she is the same and she is not engaging well and she has no anger issues and slept fairly last night but she was sleepy this morning and not engaging 6/28- remains confused with memory impairment. Pt has been hallucinating and paranoid- hiding behind her bed stating a women has been raped and stabbed. Received prn med. Sleep is better 6/29- episode of paranoid and mostly sun downing. Appear pleasant in the morning and no recollection of night time paranoia. unsteady gait due to sedation 6/30- pleasant and confused during the daytime, eating her breakfast. Still has paranoid behavior at night 7/1- slow progress as less sundowning and less paranoid. Pleasantly confused during the day time with no sig behavior problem 7/2- confused and disoriented with poor memory. Sun downing and worse in the evening with paranoia. 7/3- paranoid at night about a man coming to her room and killing. Fearful but pt is calm, pleasantly confused during daytime. Accepting med and no aggressive behavior 7/4- no sig behavior issue overnight and sleep is improved. Accepting med. Pleasant confused and with memory impairment 7/5- sleep is poor and remains paranoid at night seeing dead people. Need assistance with ADLs. No behavior issue 7/6- disturbed sleep last nigh when code s was called due to pt not responding- later cancelled as pt's vital were within normal and cxr was wnl to rule out aspiration. Pt is currently in bed responding to command and appear to be in no distress. Poor memory and attention 7/7-Pt was seen in her room with the RN. Presents alert, verbal, oriented to self only. Affect is flat. States she is feeling fine. Prn's used-Ambien 5 mg. No AVH or SI/HI. 7/8- Pt is incontinent of bowel and bladder, becomes irritable during ADL care. No significant changes from yesterday. Awaiting placement. 7/9- remains confused and disoriented to time and place. Pleasant and sleep is better. No behavior problem. Accepting med.   
7/10- remains pleasant and confused with no behavior problem. Accepting med. Sleep is better. Poor memory and disoriented to time/place. 7/11- pleasant and co operative during the interview. She remains confused with poor memory. No delusional themes and sleep is improving. 
7/12- pleasant and co operative. Remains disoriented, confused with poor memory. Slept well without prn med. No paranoid ideation 7/13- pleasant and confused. No behavior issue. Likes to color during group activities. Accepting med. Sleep is better 7/14- no behavior problem overnight. pleasant and with memory impairment. Need total care, bowel and bladder incontinent 7/15- no sig change in presentation, no behavior problem. Ready for placement. Memory impairment 7/16- poor memory, need total care, sleep is better and no behavior issue. 7/17- irritable when attended for ADLS/incontinence of bladder and bowel. Overall no physical aggression and pleasant and confuse. Accepting med 7/18- no sig behavior issue overnight. Accepting med and sleep is  Better. Pleasant and with memory impairment 7/19- stable presentation with brighter affect and memory impairment. Likes coloring . Sleep is erratic but appear rested. Accepting med. Need total care 7/20- no sig change in presentation. Remains confused with memory impairment. No behavior issue 7/21/18: seen today in her room, she was laying in bed, staff reported she did not eat her breakfast,no major behavioral issues,confuse due to cognitive impairment. Accpting med. 
7/22/18: No significant change noted,accepting po medications, sleep and appetite is good, no agitation or aggression. 7/23/18- Ms. Caroline Rick presents to rounds with flat affect, but engaging and communicative. Pleasant. Eating meals. Stable behavior. 7/24- pleasant and co operative. Appropriate smile and remains confused with poor memory. No behavior issue. 7/25- no sig behavior issue. No agitation and accepting med. Pleasant and confused. Stated she is at [facility]\". OX1 
7/26- no sig change in presentation. Need total care and memory impairment. Accepting med 7/27- no behavior problem, pleasant and confused. Seen by IM for diaper rash. No psychosis 7/28-Presents pleasant and confused (Dementia). Pt had an episode of choking, was seen by Medical. Diet changed to Mechanical soft. 7/29-No acute Psychiatric issues. She was started on Clindamycin by the Hospitalist for upper respiratory sxs. Has been Tachycardic today and is being followed by Medical.  
  
SIDE EFFECTS: (reviewed/updated 7/29/2018) None reported or admitted to. No noted toxicity with use of current med ALLERGIES:(reviewed/updated 7/29/2018) Allergies Allergen Reactions  Cephalosporins Itching  Codeine Hives  Erythromycin Hives  Nubain [Nalbuphine] Hives  Pcn [Penicillins] Hives  Percodan [Oxycodone Hcl-Oxycodone-Asa] Hives  Promethazine Hives  Protonix [Pantoprazole] Diarrhea  Sulfa (Sulfonamide Antibiotics) Hives  Toradol [Ketorolac] Hives  Ultram [Tramadol] Hives  Valium [Diazepam] Hives  Prilosec [Omeprazole] Itching MEDICATIONS PRIOR TO ADMISSION:(reviewed/updated 7/29/2018) Prescriptions Prior to Admission Medication Sig  
 multivit-minerals/folic acid (WOMEN'S MULTIVITAMIN GUMMIES PO) Take 1 Tab by mouth daily.  QUEtiapine (SEROQUEL) 25 mg tablet Take 12.5 mg by mouth daily.  carvedilol (COREG) 3.125 mg tablet Take 3.125 mg by mouth two (2) times daily (with meals).  diclofenac (VOLTAREN) 1 % gel Apply 2 g to affected area every six (6) hours as needed (arthritis pain).  acetaminophen (TYLENOL) 325 mg tablet Take 650 mg by mouth every eight (8) hours as needed for Pain.  Menthol (ASPERCREME HEAT) 10 % gel by Apply Externally route every eight (8) hours as needed (arthritis).  aspirin 81 mg chewable tablet Take 81 mg by mouth daily. Indications: myocardial infarction prevention  dilTIAZem XR (DILACOR XR) 120 mg XR capsule Take 120 mg by mouth daily. Indications: hypertension, VENTRICULAR RATE CONTROL IN ATRIAL FIBRILLATION  furosemide (LASIX) 20 mg tablet Take 20 mg by mouth daily as needed. Indications: Edema, hypertension  loratadine (CLARITIN) 10 mg tablet Take 10 mg by mouth daily as needed. Indications: Allergic Rhinitis, SNEEZING  
 famotidine (PEPCID) 20 mg tablet Take 20 mg by mouth daily. Indications: Heartburn  QUEtiapine (SEROQUEL) 25 mg tablet Take 25 mg by mouth nightly. Indications: Generalized Anxiety Disorder PAST MEDICAL HISTORY: Past medical history from the initial psychiatric evaluation has been reviewed (reviewed/updated 7/29/2018) with no additional updates (I asked patient and no additional past medical history provided). No past medical history on file. No past surgical history on file. SOCIAL HISTORY: Social history from the initial psychiatric evaluation has been reviewed (reviewed/updated 7/29/2018) with no additional updates (I asked patient and no additional social history provided). Social History Social History  Marital status: SINGLE Spouse name: N/A  
 Number of children: N/A  
 Years of education: N/A Occupational History  Not on file. Social History Main Topics  Smoking status: Not on file  Smokeless tobacco: Not on file  Alcohol use Not on file  Drug use: Not on file  Sexual activity: Not on file Other Topics Concern  Not on file Social History Narrative FAMILY HISTORY: Family history from the initial psychiatric evaluation has been reviewed (reviewed/updated 7/29/2018) with no additional updates (I asked patient and no additional family history provided). No family history on file. REVIEW OF SYSTEMS: (reviewed/updated 7/29/2018) Appetite:poor Sleep: decreased more than normal  
All other Review of Systems: Psychological ROS: positive for - behavioral disorder, concentration difficulties, disorientation and memory difficulties [facility] MENTAL STATUS EXAM (MSE): MSE FINDINGS ARE WITHIN NORMAL LIMITS (WNL) UNLESS OTHERWISE STATED BELOW. ( ALL OF THE BELOW CATEGORIES OF THE MSE HAVE BEEN REVIEWED (reviewed 7/29/2018) AND UPDATED AS DEEMED APPROPRIATE ) General Presentation age appropriate and casually dressed, unreliable and vague Orientation disorganized Vital Signs  See below (reviewed 7/29/2018); Vital Signs (BP, Pulse, & Temp) are within normal limits if not listed below. Gait and Station Stable/steady, no ataxia Musculoskeletal System No extrapyramidal symptoms (EPS); no abnormal muscular movements or Tardive Dyskinesia (TD); muscle strength and tone are within normal limits Language No aphasia or dysarthria Speech:  hypoverbal  
Thought Processes illogical; slow rate of thoughts; poor abstract reasoning/computation Thought Associations tangential  
Thought Content free of delusions, free of hallucinations and preoccupations Suicidal Ideations none Homicidal Ideations none Mood:  Less anxious Affect:  anxious and increased in intensity Memory recent  impaired Memory remote:  impaired Concentration/Attention:  distractable Fund of Knowledge below average Insight:  poor Reliability poor Judgment:  poor VITALS:    
Patient Vitals for the past 24 hrs: 
 Temp Pulse Resp BP  
07/29/18 1631 - 69 16 97/47  
07/29/18 1311 - (!) 130 16 92/57  
07/29/18 1150 - (!) 143 16 114/80  
07/29/18 1145 - (!) 138 16 104/63  
07/29/18 0651 98 °F (36.7 °C) (!) 133 16 112/72 Wt Readings from Last 3 Encounters:  
07/17/18 54.9 kg (121 lb) Temp Readings from Last 3 Encounters:  
07/29/18 98 °F (36.7 °C)  
06/17/18 97.6 °F (36.4 °C)  
06/15/18 97.6 °F (36.4 °C) BP Readings from Last 3 Encounters:  
07/29/18 97/47  
06/17/18 102/47  
06/16/18 99/55 Pulse Readings from Last 3 Encounters:  
07/29/18 69  
06/17/18 70  
06/16/18 81 DATA LABORATORY DATA:(reviewed/updated 7/29/2018) Recent Results (from the past 24 hour(s)) CBC WITH AUTOMATED DIFF Collection Time: 07/29/18  1:30 PM  
Result Value Ref Range WBC 6.3 3.6 - 11.0 K/uL  
 RBC 4.16 3.80 - 5.20 M/uL  
 HGB 12.4 11.5 - 16.0 g/dL HCT 38.0 35.0 - 47.0 % MCV 91.3 80.0 - 99.0 FL  
 MCH 29.8 26.0 - 34.0 PG  
 MCHC 32.6 30.0 - 36.5 g/dL  
 RDW 14.2 11.5 - 14.5 % PLATELET 397 584 - 975 K/uL MPV 10.5 8.9 - 12.9 FL  
 NRBC 0.0 0  WBC ABSOLUTE NRBC 0.00 0.00 - 0.01 K/uL NEUTROPHILS 54 32 - 75 % LYMPHOCYTES 33 12 - 49 % MONOCYTES 9 5 - 13 % EOSINOPHILS 4 0 - 7 % BASOPHILS 0 0 - 1 % IMMATURE GRANULOCYTES 0 0.0 - 0.5 % ABS. NEUTROPHILS 3.4 1.8 - 8.0 K/UL  
 ABS. LYMPHOCYTES 2.1 0.8 - 3.5 K/UL  
 ABS. MONOCYTES 0.6 0.0 - 1.0 K/UL  
 ABS. EOSINOPHILS 0.2 0.0 - 0.4 K/UL  
 ABS. BASOPHILS 0.0 0.0 - 0.1 K/UL  
 ABS. IMM. GRANS. 0.0 0.00 - 0.04 K/UL  
 DF AUTOMATED METABOLIC PANEL, COMPREHENSIVE Collection Time: 07/29/18  1:30 PM  
Result Value Ref Range Sodium 144 136 - 145 mmol/L Potassium 4.2 3.5 - 5.1 mmol/L Chloride 108 97 - 108 mmol/L  
 CO2 30 21 - 32 mmol/L Anion gap 6 5 - 15 mmol/L Glucose 135 (H) 65 - 100 mg/dL BUN 21 (H) 6 - 20 MG/DL Creatinine 0.93 0.55 - 1.02 MG/DL  
 BUN/Creatinine ratio 23 (H) 12 - 20 GFR est AA >60 >60 ml/min/1.73m2 GFR est non-AA 58 (L) >60 ml/min/1.73m2 Calcium 8.5 8.5 - 10.1 MG/DL Bilirubin, total 0.3 0.2 - 1.0 MG/DL  
 ALT (SGPT) 14 12 - 78 U/L  
 AST (SGOT) 17 15 - 37 U/L Alk. phosphatase 101 45 - 117 U/L Protein, total 5.9 (L) 6.4 - 8.2 g/dL Albumin 2.7 (L) 3.5 - 5.0 g/dL Globulin 3.2 2.0 - 4.0 g/dL A-G Ratio 0.8 (L) 1.1 - 2.2 MAGNESIUM Collection Time: 07/29/18  1:30 PM  
Result Value Ref Range Magnesium 1.7 1.6 - 2.4 mg/dL No results found for: VALF2, VALAC, VALP, VALPR, DS6, CRBAM, CRBAMP, CARB2, XCRBAM 
No results found for: [id number] [facility] REPORTS:(reviewed/updated 7/29/2018) Xr Chest [facility] Result Date: 6/17/2018 EXAM:  XR CHEST PORT INDICATION:  Chest Pain COMPARISON:  None. FINDINGS: A portable AP radiograph of the chest was obtained at 720 hours. The patient is on a cardiac monitor. There is minor right basilar atelectasis. There is question of a vague right perihilar airspace process. There is slight diffuse interstitial prominence. Heart size is borderline enlarged. Patient status post median sternotomy. IMPRESSION: 1. There is suggestion of a vague right perihilar airspace process and follow-up studies would be helpful to assess for interval change. There is minor right basilar atelectasis. MEDICATIONS ALL MEDICATIONS:  
Current Facility-Administered Medications Medication Dose Route Frequency  clindamycin (CLEOCIN) capsule 600 mg  600 mg Oral BID  nystatin (MYCOSTATIN) 100,000 unit/gram powder   Topical BID  dilTIAZem CD (CARDIZEM CD) capsule 120 mg  120 mg Oral DAILY  risperiDONE (RisperDAL) 1 mg/mL oral solution soln 1 mg  1 mg Oral QHS  memantine (NAMENDA) tablet 10 mg  10 mg Oral BID  ziprasidone (GEODON) 10 mg in sterile water (preservative free) 0.5 mL injection  10 mg IntraMUSCular BID PRN  
 benztropine (COGENTIN) tablet 1 mg  1 mg Oral BID PRN  
 benztropine (COGENTIN) injection 1 mg  1 mg IntraMUSCular BID PRN  
 acetaminophen (TYLENOL) tablet 650 mg  650 mg Oral Q4H PRN  
 magnesium hydroxide (MILK OF MAGNESIA) 400 mg/5 mL oral suspension 30 mL  30 mL Oral DAILY PRN  
 nicotine (NICODERM CQ) 21 mg/24 hr patch 1 Patch  1 Patch TransDERmal DAILY PRN  
 aspirin chewable tablet 81 mg  81 mg Oral DAILY  famotidine (PEPCID) tablet 20 mg  20 mg Oral DAILY  carvedilol (COREG) tablet 3.125 mg  3.125 mg Oral BID WITH MEALS  
  
SCHEDULED MEDICATIONS:  
Current Facility-Administered Medications Medication Dose Route Frequency  clindamycin (CLEOCIN) capsule 600 mg  600 mg Oral BID  nystatin (MYCOSTATIN) 100,000 unit/gram powder   Topical BID  dilTIAZem CD (CARDIZEM CD) capsule 120 mg  120 mg Oral DAILY  risperiDONE (RisperDAL) 1 mg/mL oral solution soln 1 mg  1 mg Oral QHS  memantine (NAMENDA) tablet 10 mg  10 mg Oral BID  aspirin chewable tablet 81 mg  81 mg Oral DAILY  famotidine (PEPCID) tablet 20 mg  20 mg Oral DAILY  carvedilol (COREG) tablet 3.125 mg  3.125 mg Oral BID WITH MEALS  
  
 
 
ASSESSMENT & PLAN  
 
DIAGNOSES REQUIRING ACTIVE TREATMENT AND MONITORING: (reviewed/updated 7/29/2018) Patient Active Hospital Problem List: 
 Dementia (6/17/2018) Assessment: moderate to severe- disoriented, poor memory and irritable. Need total care. No behavior problem and accepting med Plan:  Namenda. Risperdal 
 
Hypotension A: low bp 
P:ct with current tx-IM to follow 7/26-: ct with current medications. Placement. 7/27- ct with medication- accepted by [facility] In summary, Jordan Jennings, is a 66 y.o.  female who presents with a severe exacerbation of the principal diagnosis of Dementia Patient's condition is /not stable . Patient requires continued inpatient hospitalization for further stabilization, safety monitoring and medication management. I will continue to coordinate the provision of individual, milieu, occupational, group, and substance abuse therapies to address target symptoms/diagnoses as deemed appropriate for the individual patient. A coordinated, multidisplinary treatment team round was conducted with the patient (this team consists of the nurse, psychiatric unit pharmcist,  and writer). Complete current electronic health record for patient has been reviewed today including consultant notes, ancillary staff notes, nurses and psychiatric tech notes. Suicide risk assessment completed and patient deemed to be of low risk for suicide at this time.   
 
The following regarding medications was addressed during rounds with patient:  
the risks and benefits of the proposed medication. The patient was given the opportunity to ask questions. Informed consent given to the use of the above medications. Will continue to adjust psychiatric and non-psychiatric medications (see above \"medication\" section and orders section for details) as deemed appropriate & based upon diagnoses and response to treatment. I will continue to order blood tests/labs and diagnostic tests as deemed appropriate and review results as they become available (see orders for details and above listed lab/test results). I will order psychiatric records from previous [facility] hospitals to further elucidate the nature of patient's psychopathology and review once available. I will gather additional collateral information from friends, family and o/p treatment team to further elucidate the nature of patient's psychopathology and baselline level of psychiatric functioning. I certify that this patient's inpatient psychiatric hospital services furnished since the previous certification were, and continue to be, required for treatment that could reasonably be expected to improve the patient's condition, or for diagnostic study, and that the patient continues to need, on a daily basis, active treatment furnished directly by or requiring the supervision of inpatient psychiatric facility personnel. In addition, the hospital records show that services furnished were intensive treatment services, admission or related services, or equivalent services. EXPECTED DISCHARGE DATE/DAY: Monday DISPOSITION: Home/correction Signed By:  
Beto Broussard MD 
7/29/2018

## 2018-08-03 NOTE — DISCHARGE SUMMARY
PSYCHIATRIC DISCHARGE SUMMARY         IDENTIFICATION:    Patient Name  Una Go   Date of Birth 1940   [facility] [id number]   Medical Record Number  [id number]      Age  66 y.o. PCP Berenice Spears MD   Admit date:  6/17/2018    Discharge date: 8/3/2018   Room Number  322/01  @ [facility]   Date of Service  8/3/2018            TYPE OF DISCHARGE: REGULAR               CONDITION AT DISCHARGE: stable       PROVISIONAL & DISCHARGE DIAGNOSES:    Problem List  Never Reviewed          Codes Class    * (Principal)Dementia ICD-10-CM: F03.90  ICD-9-CM: 294.20         Hypotension ICD-10-CM: I95.9  ICD-9-CM: 458.9         Dementia due to Alzheimer's disease ICD-10-CM: G30.9, F02.80  ICD-9-CM: 331.0, 294.10         Alteration of behavior in patient older than 59years of age ICD-10-CM: F80.7  ICD-9-CM: 312.89               Active Hospital Problems    *Dementia        DISCHARGE DIAGNOSIS:   Axis I:  SEE ABOVE  Axis II: SEE ABOVE  Axis III: SEE ABOVE  Axis IV:  lack of structure  Axis V:  20 on admission, 40 on discharge 40(baseline)       CC & HISTORY OF PRESENT ILLNESS:  The patient, Una Go, is a 68 y.o. WHITE [place]  female with a past psychiatric history significant for dementia, who presents at this time with complaints of (and/or evidence of) the following emotional symptoms: memory impairment. Additional symptomatology include disoriented to time and place, poor memory and behavior issues. pt has been incontinent and need assistance with ADLs. She is irritable and easily gets loud and labile in mood. She was dc to medical unit for tx of hypotension and now back on [facility] for further tx  The above symptoms have been present for years. These symptoms are of severe severity. These symptoms are constant  in nature.         SOCIAL HISTORY:    Social History     Social History    Marital status: SINGLE     Spouse name: N/A    Number of children: N/A    Years of education: N/A     Occupational History    Not on file. Social History Main Topics    Smoking status: Not on file    Smokeless tobacco: Not on file    Alcohol use Not on file    Drug use: Not on file    Sexual activity: Not on file     Other Topics Concern    Not on file     Social History Narrative      FAMILY HISTORY:   No family history on file. HOSPITALIZATION COURSE:    Niesha Talley was admitted to the inpatient psychiatric unit [facility] for acute psychiatric stabilization in regards to symptomatology as described in the HPI above. .  While on the unit Niesha Talley was involved in individual, group, occupational and milieu therapy. Psychiatric medications were adjusted during this hospitalization including Risperdal and . Niesha Talley demonstrated a slow, but progressive improvement in overall condition. Much of patient's depression appeared to be related to situational stressors,  psychological factors. Please see individual progress notes for more specific details regarding patient's hospitalization course. At time of discharge, Niesha Talley is without significant problems of depression psychosis  rolly. Patient free of suicidal and homicidal ideations (appears to be at very low risk of suicide or homicide) and reports many positive predictive factors in terms of not attempting suicide or homicide. Overall presentation at time of discharge is most consistent with the diagnosis of dementia with behavioral problem. Patient with request for discharge today. There are no grounds to seek a TDO. Patient has maximized benefit to be derived from acute inpatient psychiatric treatment. All members of the treatment team concur with each other in regards to plans for discharge today per patient's request.  Patient and family are aware and in agreement with discharge and discharge plan. Per my last note: pt is pleasant and co operative. Confused and disoriented at baseline with poor memory.  Need total care due to bladder and bowel incontinence.  No agitation         LABS AND IMAGAING:    Labs Reviewed   METABOLIC PANEL, COMPREHENSIVE - Abnormal; Notable for the following:        Result Value    Glucose 135 (*)     BUN 21 (*)     BUN/Creatinine ratio 23 (*)     GFR est non-AA 58 (*)     Protein, total 5.9 (*)     Albumin 2.7 (*)     A-G Ratio 0.8 (*)     All other components within normal limits   GLUCOSE, POC - Abnormal; Notable for the following:     Glucose (POC) 115 (*)     All other components within normal limits   LIPID PANEL   HEMOGLOBIN A1C WITH EAG   CBC WITH AUTOMATED DIFF   MAGNESIUM     No results found for: DS35, PHEN, PHENO, PHENT, DILF, DS39, PHENY, PTN, VALF2, VALAC, VALP, VALPR, DS6, CRBAM, CRBAMP, CARB2, XCRBAM  Admission on 06/17/2018, Discharged on 07/30/2018   Component Date Value Ref Range Status    LIPID PROFILE 06/29/2018        Final    Cholesterol, total 06/29/2018 164  <200 MG/DL Final    Triglyceride 06/29/2018 93  <150 MG/DL Final    HDL Cholesterol 06/29/2018 51  MG/DL Final    LDL, calculated 06/29/2018 94.4  0 - 100 MG/DL Final    VLDL, calculated 06/29/2018 18.6  MG/DL Final    CHOL/HDL Ratio 06/29/2018 3.2  0 - 5.0   Final    Hemoglobin A1c 06/29/2018 5.3  4.2 - 6.3 % Final    Est. average glucose 06/29/2018 105  mg/dL Final    Glucose (POC) 07/05/2018 115* 65 - 100 mg/dL Final    Performed by 07/05/2018 Armando Conrad   Final    WBC 07/29/2018 6.3  3.6 - 11.0 K/uL Final    RBC 07/29/2018 4.16  3.80 - 5.20 M/uL Final    HGB 07/29/2018 12.4  11.5 - 16.0 g/dL Final    HCT 07/29/2018 38.0  35.0 - 47.0 % Final    MCV 07/29/2018 91.3  80.0 - 99.0 FL Final    MCH 07/29/2018 29.8  26.0 - 34.0 PG Final    MCHC 07/29/2018 32.6  30.0 - 36.5 g/dL Final    RDW 07/29/2018 14.2  11.5 - 14.5 % Final    PLATELET 36/81/3198 828  150 - 400 K/uL Final    MPV 07/29/2018 10.5  8.9 - 12.9 FL Final    NRBC 07/29/2018 0.0  0  WBC Final    ABSOLUTE NRBC 07/29/2018 0.00 0.00 - 0.01 K/uL Final    NEUTROPHILS 07/29/2018 54  32 - 75 % Final    LYMPHOCYTES 07/29/2018 33  12 - 49 % Final    MONOCYTES 07/29/2018 9  5 - 13 % Final    EOSINOPHILS 07/29/2018 4  0 - 7 % Final    BASOPHILS 07/29/2018 0  0 - 1 % Final    IMMATURE GRANULOCYTES 07/29/2018 0  0.0 - 0.5 % Final    ABS. NEUTROPHILS 07/29/2018 3.4  1.8 - 8.0 K/UL Final    ABS. LYMPHOCYTES 07/29/2018 2.1  0.8 - 3.5 K/UL Final    ABS. MONOCYTES 07/29/2018 0.6  0.0 - 1.0 K/UL Final    ABS. EOSINOPHILS 07/29/2018 0.2  0.0 - 0.4 K/UL Final    ABS. BASOPHILS 07/29/2018 0.0  0.0 - 0.1 K/UL Final    ABS. IMM. GRANS. 07/29/2018 0.0  0.00 - 0.04 K/UL Final    DF 07/29/2018 AUTOMATED    Final    Sodium 07/29/2018 144  136 - 145 mmol/L Final    Potassium 07/29/2018 4.2  3.5 - 5.1 mmol/L Final    Chloride 07/29/2018 108  97 - 108 mmol/L Final    CO2 07/29/2018 30  21 - 32 mmol/L Final    Anion gap 07/29/2018 6  5 - 15 mmol/L Final    Glucose 07/29/2018 135* 65 - 100 mg/dL Final    BUN 07/29/2018 21* 6 - 20 MG/DL Final    Creatinine 07/29/2018 0.93  0.55 - 1.02 MG/DL Final    BUN/Creatinine ratio 07/29/2018 23* 12 - 20   Final    GFR est AA 07/29/2018 >60  >60 ml/min/1.73m2 Final    GFR est non-AA 07/29/2018 58* >60 ml/min/1.73m2 Final    Calcium 07/29/2018 8.5  8.5 - 10.1 MG/DL Final    Bilirubin, total 07/29/2018 0.3  0.2 - 1.0 MG/DL Final    ALT (SGPT) 07/29/2018 14  12 - 78 U/L Final    AST (SGOT) 07/29/2018 17  15 - 37 U/L Final    Alk.  phosphatase 07/29/2018 101  45 - 117 U/L Final    Protein, total 07/29/2018 5.9* 6.4 - 8.2 g/dL Final    Albumin 07/29/2018 2.7* 3.5 - 5.0 g/dL Final    Globulin 07/29/2018 3.2  2.0 - 4.0 g/dL Final    A-G Ratio 07/29/2018 0.8* 1.1 - 2.2   Final    Magnesium 07/29/2018 1.7  1.6 - 2.4 mg/dL Final     Xr Chest Port    Result Date: 7/30/2018  EXAM:  XR CHEST PORT INDICATION:  aspiration r/o pneumonia COMPARISON:  7/28/2018 FINDINGS: A portable AP radiograph of the chest was obtained at 7:24 hours. The lungs are clear. There is unchanged mild cardiomegaly and a calcified, atherosclerotic aorta. There is no significant vascular congestion or pleural fluid. The patient has undergone prior median sternotomy and cardiac surgery. There has been no change since the prior study. IMPRESSION: No acute process. Xr Chest Port    Result Date: 7/28/2018  EXAM:  XR CHEST PORT INDICATION:  r/o blokcage memory impairment COMPARISON:  7/6/2018 FINDINGS: A portable AP radiograph of the chest was obtained at 1700 hours. There is slight left apical pleural thickening which is unchanged. No focal airspace process is identified. [facility] Heart size is stable. .  Patient is status post median sternotomy. .     IMPRESSION: No change. No acute abnormality identified. Xr Chest Port    Result Date: 7/6/2018  INDICATION: Suspected aspiration COMPARISON: June 17, 2018 FINDINGS: AP portable imaging of the chest performed at 12:02 AM demonstrates a stable cardiomediastinal silhouette. The patient is status post median sternotomy. There is no new airspace disease or pleural effusion. No significant osseous abnormalities are seen. IMPRESSION: No evidence of acute cardiopulmonary process. Xr Abd Port  1 V    Result Date: 7/6/2018  INDICATION: Suspected fecal impaction FINDINGS: Single supine view of the abdomen demonstrates a nonspecific intestinal gas pattern there is a moderate amount of stool throughout the colon. A large amount of stool is present in the rectum. No soft tissue mass is seen. Atherosclerotic calcifications are present in the abdominal aorta and iliac arteries. Surgical clips are seen in the upper abdomen. The osseous structures are unremarkable. IMPRESSION: Moderate amount of stool throughout the colon, with large amount of stool in the rectum. DISPOSITION:    VICENTE. Patient to f/u with psychiatric and internist appointment.                FOLLOW-UP CARE:    Activity as tolerated  Regular Diet  Wound Care: none needed. Follow-up Information     Follow up With Details Comments [facility] today Your will be residing here and your medication prescriptions were faxed to their facility where they will fill your prescriptions and continue medication management. Address: [address] [place] [address], [address] [place] [address] Avenue  Phone: [phone]                 PROGNOSIS:   Poor---- based on nature of patient's pathology/ies and treatment compliance issues. Prognosis is poor due to progressive nature of her illness. DISCHARGE MEDICATIONS:     Informed consent given for the use of following psychotropic medications:  Discharge Medication List as of 7/30/2018 10:57 AM      START taking these medications    Details   !! aspirin 81 mg chewable tablet Take 1 Tab by mouth daily for 14 days. Indications: myocardial infarction prevention, Print, Disp-14 Tab, R-0      dilTIAZem CD (CARDIZEM CD) 120 mg ER capsule Take 1 Cap by mouth daily for 14 days. Indications: hypertension, Print, Disp-14 Cap, R-0      !! famotidine (PEPCID) 20 mg tablet Take 1 Tab by mouth daily for 14 days. Indications: Heartburn, Print, Disp-14 Tab, R-0      memantine (NAMENDA) 10 mg tablet Take 1 Tab by mouth two (2) times a day for 14 days. Indications: MODERATE TO SEVERE ALZHEIMER'S TYPE DEMENTIA, Print, Disp-28 Tab, R-0      nystatin (MYCOSTATIN) powder Apply  to affected area two (2) times a day for 14 days. Apply to affected area bid  Indications: Diaper Rash, Print, Disp-15 g, R-0      risperiDONE (RISPERDAL) 1 mg/mL oral solution Take 1 mL by mouth nightly for 14 days. Indications: behavior sx with dementia, Print, Disp-14 mL, R-0       !! - Potential duplicate medications found. Please discuss with provider. CONTINUE these medications which have CHANGED    Details   !! carvedilol (COREG) 3.125 mg tablet Take 1 Tab by mouth two (2) times daily (with meals) for 14 days.  Indications: PREVENTION OF A. FIB POST CARDIO-THORACIC SURGERY, Print, Disp-28 Tab, R-0       !! - Potential duplicate medications found. Please discuss with provider. CONTINUE these medications which have NOT CHANGED    Details   !! carvedilol (COREG) 3.125 mg tablet Take 3.125 mg by mouth two (2) times daily (with meals). , Historical Med      acetaminophen (TYLENOL) 325 mg tablet Take 650 mg by mouth every eight (8) hours as needed for Pain., Historical Med      !! aspirin 81 mg chewable tablet Take 81 mg by mouth daily. Indications: myocardial infarction prevention, Historical Med      dilTIAZem XR (DILACOR XR) 120 mg XR capsule Take 120 mg by mouth daily. Indications: hypertension, VENTRICULAR RATE CONTROL IN ATRIAL FIBRILLATION, Historical Med      !! famotidine (PEPCID) 20 mg tablet Take 20 mg by mouth daily. Indications: Heartburn, Historical Med       !! - Potential duplicate medications found. Please discuss with provider. STOP taking these medications       multivit-minerals/folic acid (WOMEN'S MULTIVITAMIN GUMMIES PO) Comments:   Reason for Stopping:         QUEtiapine (SEROQUEL) 25 mg tablet Comments:   Reason for Stopping:         diclofenac (VOLTAREN) 1 % gel Comments:   Reason for Stopping:         Menthol (ASPERCREME HEAT) 10 % gel Comments:   Reason for Stopping:         furosemide (LASIX) 20 mg tablet Comments:   Reason for Stopping:         loratadine (CLARITIN) 10 mg tablet Comments:   Reason for Stopping:         QUEtiapine (SEROQUEL) 25 mg tablet Comments:   Reason for Stopping:                      A coordinated, multidisplinary treatment team round was conducted with Lucita Husain is done daily here at [facility]. This team consists of the nurse, psychiatric unit pharmcist,  and writer. I have spent greater than 35 minutes on discharge work.     Signed:  Nay Perez MD  8/3/2018

## 2018-09-11 NOTE — PROGRESS NOTES
Problem: Dementia-BEHAVIORAL HEALTH (Adult/Pediatric)  Goal: *STG: Remains safe in hospital  Outcome: Progressing Towards Goal  Pt is alert, was visible on the unit this shift. Pt continues to be 1:1 for safety. Pt is 1 person assist during ambulation, toileting and hygiene. Pt continues to be confused periodically and expressing paranoid delusions. She is meal and medication compliant. Pt received Ambien HS. Pt is currently redirectable, laying in her bed mumbling to herself. 1:1 while awake continued at this time. No

## 2020-03-12 NOTE — BH NOTES
PSYCHIATRIC PROGRESS NOTE         Patient Name  Lamin Ricks   Date of Birth 1940   [facility] [id number]   Medical Record Number  [id number]      Age  66 y.o. PCP Berenice Spears, MD   Admit date:  6/17/2018    Room Number  321/01  @ [facility]   Date of Service  7/5/2018           E & M PROGRESS NOTE:         HISTORY       CC:  \"confused, memory impairment\"  HISTORY OF PRESENT ILLNESS/INTERVAL HISTORY:  (reviewed/updated 7/5/2018). per initial evaluation: The patient, Lamin Ricks, is a 68 y.o. WHITE [place]  female with a past psychiatric history significant for dementia, who presents at this time with complaints of (and/or evidence of) the following emotional symptoms: memory impairment. Additional symptomatology include disoriented to time and place, poor memory and behavior issues. pt has been incontent and need assistance with ADLs. She is irritable and easily gets loud and labile in mood. She was dc to medical unit for tx of hypotension and now back on U for further tx  The above symptoms have been present for years. These symptoms are of severe severity. These symptoms are constant  in nature. Lamin Ricks presents/reports/evidences the following emotional symptoms today, 7/5/2018:memory impairment. The above symptoms have been present for years. These symptoms are of moderate severity. The symptoms are constant  in nature. Additional symptomatology and features include confused , disoriented to place and time. Pt is now accepting her med and need assistance with ADLs due to incontinence. No aggressive behavior and affect is bright , sleep is improving, pleasant and co operative. Received no prn.  6/25/18 she is compliant with medications and no anger issues like before and she gets confused and disoriented   6/26/18 she is doing better and mood is better and no anger issues and no SI or HI and slept well last night  6/27/18 she is the same and she is not engaging well and she has no anger issues and slept fairly last night but she was sleepy this morning and not engaging    6/28- remains confused with memory impairment. Pt has been hallucinating and paranoid- hiding behind her bed stating a women has been raped and stabbed. Received prn med. Sleep is better  6/29- episode of paranoid and mostly sun downing. Appear pleasant in the morning and no recollection of night time paranoia. unsteady gait due to sedation  6/30- pleasant and confused during the daytime, eating her breakfast. Still has paranoid behavior at night  7/1- slow progress as less sundowning and less paranoid. Pleasantly confused during the day time with no sig behavior problem  7/2- confused and disoriented with poor memory. Sun downing and worse in the evening with paranoia. 7/3- paranoid at night about a man coming to her room and killing. Fearful but pt is calm, pleasantly confused during daytime. Accepting med and no aggressive behavior  7/4- no sig behavior issue overnight and sleep is improved. Accepting med. Pleasant confused and with memory impairment  7/5- sleep is poor and remains paranoid at night seeing dead people. Need assistance with ADLs. No behavior issue      SIDE EFFECTS: (reviewed/updated 7/5/2018)  None reported or admitted to.   No noted toxicity with use of current med   ALLERGIES:(reviewed/updated 7/5/2018)  Allergies   Allergen Reactions    Cephalosporins Itching    Codeine Hives    Erythromycin Hives    Nubain [Nalbuphine] Hives    Pcn [Penicillins] Hives    Percodan [Oxycodone Hcl-Oxycodone-Asa] Hives    Promethazine Hives    Protonix [Pantoprazole] Diarrhea    Sulfa (Sulfonamide Antibiotics) Hives    Toradol [Ketorolac] Hives    Ultram [Tramadol] Hives    Valium [Diazepam] Hives    Prilosec [Omeprazole] Itching      MEDICATIONS PRIOR TO ADMISSION:(reviewed/updated 7/5/2018)  Prescriptions Prior to Admission   Medication Sig    multivit-minerals/folic acid (WOMEN'S MULTIVITAMIN GUMMIES PO) Take 1 Tab by mouth daily.  QUEtiapine (SEROQUEL) 25 mg tablet Take 12.5 mg by mouth daily.  carvedilol (COREG) 3.125 mg tablet Take 3.125 mg by mouth two (2) times daily (with meals).  diclofenac (VOLTAREN) 1 % gel Apply 2 g to affected area every six (6) hours as needed (arthritis pain).  acetaminophen (TYLENOL) 325 mg tablet Take 650 mg by mouth every eight (8) hours as needed for Pain.  Menthol (ASPERCREME HEAT) 10 % gel by Apply Externally route every eight (8) hours as needed (arthritis).  aspirin 81 mg chewable tablet Take 81 mg by mouth daily. Indications: myocardial infarction prevention    dilTIAZem XR (DILACOR XR) 120 mg XR capsule Take 120 mg by mouth daily. Indications: hypertension, VENTRICULAR RATE CONTROL IN ATRIAL FIBRILLATION    furosemide (LASIX) 20 mg tablet Take 20 mg by mouth daily as needed. Indications: Edema, hypertension    loratadine (CLARITIN) 10 mg tablet Take 10 mg by mouth daily as needed. Indications: Allergic Rhinitis, SNEEZING    famotidine (PEPCID) 20 mg tablet Take 20 mg by mouth daily. Indications: Heartburn    QUEtiapine (SEROQUEL) 25 mg tablet Take 25 mg by mouth nightly. Indications: Generalized Anxiety Disorder      PAST MEDICAL HISTORY: Past medical history from the initial psychiatric evaluation has been reviewed (reviewed/updated 7/5/2018) with no additional updates (I asked patient and no additional past medical history provided). No past medical history on file. No past surgical history on file. SOCIAL HISTORY: Social history from the initial psychiatric evaluation has been reviewed (reviewed/updated 7/5/2018) with no additional updates (I asked patient and no additional social history provided). Social History     Social History    Marital status: SINGLE     Spouse name: N/A    Number of children: N/A    Years of education: N/A     Occupational History    Not on file.      Social History Main Topics    Smoking status: Not on file    Smokeless tobacco: Not on file    Alcohol use Not on file    Drug use: Not on file    Sexual activity: Not on file     Other Topics Concern    Not on file     Social History Narrative      FAMILY HISTORY: Family history from the initial psychiatric evaluation has been reviewed (reviewed/updated 7/5/2018) with no additional updates (I asked patient and no additional family history provided). No family history on file. REVIEW OF SYSTEMS: (reviewed/updated 7/5/2018)  Appetite:poor   Sleep: decreased more than normal   All other Review of Systems: Psychological ROS: positive for - behavioral disorder, concentration difficulties, disorientation and memory difficulties         [facility] (MSE):    MSE FINDINGS ARE WITHIN NORMAL LIMITS (WNL) UNLESS OTHERWISE STATED BELOW. ( ALL OF THE BELOW CATEGORIES OF THE MSE HAVE BEEN REVIEWED (reviewed 7/5/2018) AND UPDATED AS DEEMED APPROPRIATE )  General Presentation age appropriate and casually dressed, unreliable and vague   Orientation disorganized   Vital Signs  See below (reviewed 7/5/2018); Vital Signs (BP, Pulse, & Temp) are within normal limits if not listed below.    Gait and Station Stable/steady, no ataxia   Musculoskeletal System No extrapyramidal symptoms (EPS); no abnormal muscular movements or Tardive Dyskinesia (TD); muscle strength and tone are within normal limits   Language No aphasia or dysarthria   Speech:  hypoverbal   Thought Processes illogical; slow rate of thoughts; poor abstract reasoning/computation   Thought Associations tangential   Thought Content free of delusions, free of hallucinations and preoccupations   Suicidal Ideations none   Homicidal Ideations none   Mood:  Less anxious   Affect:  anxious and increased in intensity   Memory recent  impaired   Memory remote:  impaired   Concentration/Attention:  distractable   Fund of Knowledge below average   Insight:  poor   Reliability poor   Judgment: poor          VITALS:     Patient Vitals for the past 24 hrs:   Temp Pulse Resp BP SpO2   07/05/18 1036 - 63 - 134/72 -   07/05/18 0630 97.4 °F (36.3 °C) 69 18 133/65 -   07/04/18 1713 97.8 °F (36.6 °C) 62 16 136/60 -   07/04/18 1147 97.9 °F (36.6 °C) 60 16 129/62 96 %     Wt Readings from Last 3 Encounters:   06/18/18 54.6 kg (120 lb 4.8 oz)     Temp Readings from Last 3 Encounters:   07/05/18 97.4 °F (36.3 °C)   06/17/18 97.6 °F (36.4 °C)   06/15/18 97.6 °F (36.4 °C)     BP Readings from Last 3 Encounters:   07/05/18 134/72   06/17/18 102/47   06/16/18 99/55     Pulse Readings from Last 3 Encounters:   07/05/18 63   06/17/18 70   06/16/18 81            DATA     LABORATORY DATA:(reviewed/updated 7/5/2018)  No results found for this or any previous visit (from the past 24 hour(s)). No results found for: VALF2, VALAC, VALP, VALPR, DS6, CRBAM, CRBAMP, CARB2, XCRBAM  No results found for: LITHM   RADIOLOGY REPORTS:(reviewed/updated 7/5/2018)  Xr Chest Port    Result Date: 6/17/2018  EXAM:  XR CHEST PORT INDICATION:  Chest Pain COMPARISON:  None. FINDINGS: A portable AP radiograph of the chest was obtained at 720 hours. The patient is on a cardiac monitor. There is minor right basilar atelectasis. There is question of a vague right perihilar airspace process. There is slight diffuse interstitial prominence. Heart size is borderline enlarged. Patient status post median sternotomy. IMPRESSION: 1. There is suggestion of a vague right perihilar airspace process and follow-up studies would be helpful to assess for interval change. There is minor right basilar atelectasis.           MEDICATIONS     ALL MEDICATIONS:   Current Facility-Administered Medications   Medication Dose Route Frequency    risperiDONE (RisperDAL) tablet 1 mg  1 mg Oral QHS    memantine (NAMENDA) tablet 10 mg  10 mg Oral BID    dilTIAZem ER (CARDIZEM LA) tablet 120 mg  120 mg Oral DAILY    ziprasidone (GEODON) 10 mg in sterile water (preservative free) 0.5 mL injection  10 mg IntraMUSCular BID PRN    benztropine (COGENTIN) tablet 1 mg  1 mg Oral BID PRN    benztropine (COGENTIN) injection 1 mg  1 mg IntraMUSCular BID PRN    zolpidem (AMBIEN) tablet 5 mg  5 mg Oral QHS PRN    acetaminophen (TYLENOL) tablet 650 mg  650 mg Oral Q4H PRN    magnesium hydroxide (MILK OF MAGNESIA) 400 mg/5 mL oral suspension 30 mL  30 mL Oral DAILY PRN    nicotine (NICODERM CQ) 21 mg/24 hr patch 1 Patch  1 Patch TransDERmal DAILY PRN    aspirin chewable tablet 81 mg  81 mg Oral DAILY    famotidine (PEPCID) tablet 20 mg  20 mg Oral DAILY    carvedilol (COREG) tablet 3.125 mg  3.125 mg Oral BID WITH MEALS      SCHEDULED MEDICATIONS:   Current Facility-Administered Medications   Medication Dose Route Frequency    risperiDONE (RisperDAL) tablet 1 mg  1 mg Oral QHS    memantine (NAMENDA) tablet 10 mg  10 mg Oral BID    dilTIAZem ER (CARDIZEM LA) tablet 120 mg  120 mg Oral DAILY    aspirin chewable tablet 81 mg  81 mg Oral DAILY    famotidine (PEPCID) tablet 20 mg  20 mg Oral DAILY    carvedilol (COREG) tablet 3.125 mg  3.125 mg Oral BID WITH MEALS          ASSESSMENT & PLAN     DIAGNOSES REQUIRING ACTIVE TREATMENT AND MONITORING: (reviewed/updated 7/5/2018)  Patient Active Hospital Problem List:   Dementia (6/17/2018)    Assessment: moderate to severe- disoriented, poor memory and irritable. Need total care. No behavior problem and accepting med    Plan:  Radha Hernandez. - optimize dose- 5 mg bid. Pt has lost her halfway- will need placement  06/24/18: Continue current treatement  Hypotension  A: recent dc from medical unit- BP is improving  P: ct to monitor by IM- cautious with AP- dc Seroquel  6/25/18 continue same medications   6/26/18 continue same medications   6/27/18 continue same medications    6/28- add Risperdal and titrate.  Ct with Namenda  6/29- cautious with prn Zyprexa due to unsteady gait- ct to adjust med  6/30- ct with tx  7/1- ct with tx plan  7/2- s/p ICU stay, multiple advanced comorbidities - ID and renal and medical follow up notes reviewed  work up in progress  assist with all ADL  wound care - decubitus care - pain assessment  VS and HD noted  spoke with Dtr about GOC and Code Status - pt to remain Full Code - as per DTR - she wanted all measures done - inclusive of CPR and Life Support - would not want to linger on Life Support  will follow  will monitor needs and sx  nutritional optimization  prognosis remains guarded to poor titrate Namenda  7/3- increase Risperdal. Ct with Namenda  7/4- ct with tx plan  7/5- titrtae Risperdal to 1 mg HS  In summary, Morenita Atwood, is a 66 y.o.  female who presents with a severe exacerbation of the principal diagnosis of Dementia  Patient's condition is worsening/not improving/not stable . Patient requires continued inpatient hospitalization for further stabilization, safety monitoring and medication management. I will continue to coordinate the provision of individual, milieu, occupational, group, and substance abuse therapies to address target symptoms/diagnoses as deemed appropriate for the individual patient. A coordinated, multidisplinary treatment team round was conducted with the patient (this team consists of the nurse, psychiatric unit pharmcist,  and writer). Complete current electronic health record for patient has been reviewed today including consultant notes, ancillary staff notes, nurses and psychiatric tech notes. Suicide risk assessment completed and patient deemed to be of low risk for suicide at this time. The following regarding medications was addressed during rounds with patient:   the risks and benefits of the proposed medication. The patient was given the opportunity to ask questions. Informed consent given to the use of the above medications. Will continue to adjust psychiatric and non-psychiatric medications (see above \"medication\" section and orders section for details) as deemed appropriate & based upon diagnoses and response to treatment. I will continue to order blood tests/labs and diagnostic tests as deemed appropriate and review results as they become available (see orders for details and above listed lab/test results). I will order psychiatric records from previous [facility] hospitals to further elucidate the nature of patient's psychopathology and review once available.     I will gather additional collateral information from friends, [employer], dry dressing, off loading. family and o/p treatment team to further elucidate the nature of patient's psychopathology and baselline level of psychiatric functioning. I certify that this patient's inpatient psychiatric hospital services furnished since the previous certification were, and continue to be, required for treatment that could reasonably be expected to improve the patient's condition, or for diagnostic study, and that the patient continues to need, on a daily basis, active treatment furnished directly by or requiring the supervision of inpatient psychiatric facility personnel. In addition, the hospital records show that services furnished were intensive treatment services, admission or related services, or equivalent services.     EXPECTED DISCHARGE DATE/DAY: TBD     DISPOSITION: Home/penitentiary       Signed By:   Kelly Cline MD  7/5/2018

## 2020-08-19 NOTE — BH NOTES
PSYCHIATRIC PROGRESS NOTE         Patient Name  Chloe Romano   Date of Birth 1940   [facility] [id number]   Medical Record Number  [id number]      Age  66 y.o. PCP Berenice Spears, MD   Admit date:  6/17/2018    Room Number  322/01  @ [facility]   Date of Service  7/26/2018           E & M PROGRESS NOTE:         HISTORY       CC:  \"confused, memory impairment\"  HISTORY OF PRESENT ILLNESS/INTERVAL HISTORY:  (reviewed/updated 7/26/2018). per initial evaluation: The patient, Chloe Romano, is a 68 y.o. WHITE [place]  female with a past psychiatric history significant for dementia, who presents at this time with complaints of (and/or evidence of) the following emotional symptoms: memory impairment. Additional symptomatology include disoriented to time and place, poor memory and behavior issues. pt has been incontent and need assistance with ADLs. She is irritable and easily gets loud and labile in mood. She was dc to medical unit for tx of hypotension and now back on U for further tx  The above symptoms have been present for years. These symptoms are of severe severity. These symptoms are constant  in nature. Chloe Romano presents/reports/evidences the following emotional symptoms today, 7/26/2018:memory impairment. The above symptoms have been present for years. These symptoms are of moderate severity. The symptoms are constant  in nature. Additional symptomatology and features include confused , disoriented to place and time. Pt is now accepting her med and need assistance with ADLs due to incontinence. No aggressive behavior and affect is bright , sleep is improving, pleasant and co operative. Received no prn.  6/25/18 she is compliant with medications and no anger issues like before and she gets confused and disoriented   6/26/18 she is doing better and mood is better and no anger issues and no SI or HI and slept well last night  6/27/18 she is the same and she is not engaging well [Dear  ___] : Dear  [unfilled], and she has no anger issues and slept fairly last night but she was sleepy this morning and not engaging    6/28- remains confused with memory impairment. Pt has been hallucinating and paranoid- hiding behind her bed stating a women has been raped and stabbed. Received prn med. Sleep is better  6/29- episode of paranoid and mostly sun downing. Appear pleasant in the morning and no recollection of night time paranoia. unsteady gait due to sedation  6/30- pleasant and confused during the daytime, eating her breakfast. Still has paranoid behavior at night  7/1- slow progress as less sundowning and less paranoid. Pleasantly confused during the day time with no sig behavior problem  7/2- confused and disoriented with poor memory. Sun downing and worse in the evening with paranoia. 7/3- paranoid at night about a man coming to her room and killing. Fearful but pt is calm, pleasantly confused during daytime. Accepting med and no aggressive behavior  7/4- no sig behavior issue overnight and sleep is improved. Accepting med. Pleasant confused and with memory impairment  7/5- sleep is poor and remains paranoid at night seeing dead people. Need assistance with ADLs. No behavior issue  7/6- disturbed sleep last nigh when code s was called due to pt not responding- later cancelled as pt's vital were within normal and cxr was wnl to rule out aspiration. Pt is currently in bed responding to command and appear to be in no distress. Poor memory and attention  7/7-Pt was seen in her room with the RN. Presents alert, verbal, oriented to self only. Affect is flat. States she is feeling fine. Prn's used-Ambien 5 mg. No AVH or SI/HI. 7/8- Pt is incontinent of bowel and bladder, becomes irritable during ADL care. No significant changes from yesterday. Awaiting placement. 7/9- remains confused and disoriented to time and place. Pleasant and sleep is better. No behavior problem. Accepting med.    7/10- remains pleasant and confused with no [Please see my note below.] : Please see my note below. behavior problem. Accepting med. Sleep is better. Poor memory and disoriented to time/place. 7/11- pleasant and co operative during the interview. She remains confused with poor memory. No delusional themes and sleep is improving.  7/12- pleasant and co operative. Remains disoriented, confused with poor memory. Slept well without prn med. No paranoid ideation  7/13- pleasant and confused. No behavior issue. Likes to color during group activities. Accepting med. Sleep is better  7/14- no behavior problem overnight. pleasant and with memory impairment. Need total care, bowel and bladder incontinent  7/15- no sig change in presentation, no behavior problem. Ready for placement. Memory impairment  7/16- poor memory, need total care, sleep is better and no behavior issue. 7/17- irritable when attended for ADLS/incontinence of bladder and bowel. Overall no physical aggression and pleasant and confuse. Accepting med  7/18- no sig behavior issue overnight. Accepting med and sleep is  Better. Pleasant and with memory impairment  7/19- stable presentation with brighter affect and memory impairment. Likes coloring . Sleep is erratic but appear rested. Accepting med. Need total care  7/20- no sig change in presentation. Remains confused with memory impairment. No behavior issue  7/21/18: seen today in her room, she was laying in bed, staff reported she did not eat her breakfast,no major behavioral issues,confuse due to cognitive impairment. Accpting med.  7/22/18: No significant change noted,accepting po medications, sleep and appetite is good, no agitation or aggression. 7/23/18- Ms. Rachele Vera presents to rounds with flat affect, but engaging and communicative. Pleasant. Eating meals. Stable behavior. 7/24- pleasant and co operative. Appropriate smile and remains confused with poor memory. No behavior issue. 7/25- no sig behavior issue. No agitation and accepting med. Pleasant and confused. Stated she is at [facility]\". OX1  7/26- no sig change in presentation. Need total care and memory impairment. Accepting med      SIDE EFFECTS: (reviewed/updated 7/26/2018)  None reported or admitted to. No noted toxicity with use of current med   ALLERGIES:(reviewed/updated 7/26/2018)  Allergies   Allergen Reactions    Cephalosporins Itching    Codeine Hives    Erythromycin Hives    Nubain [Nalbuphine] Hives    Pcn [Penicillins] Hives    Percodan [Oxycodone Hcl-Oxycodone-Asa] Hives    Promethazine Hives    Protonix [Pantoprazole] Diarrhea    Sulfa (Sulfonamide Antibiotics) Hives    Toradol [Ketorolac] Hives    Ultram [Tramadol] Hives    Valium [Diazepam] Hives    Prilosec [Omeprazole] Itching      MEDICATIONS PRIOR TO ADMISSION:(reviewed/updated 7/26/2018)  Prescriptions Prior to Admission   Medication Sig    multivit-minerals/folic acid (WOMEN'S MULTIVITAMIN GUMMIES PO) Take 1 Tab by mouth daily.  QUEtiapine (SEROQUEL) 25 mg tablet Take 12.5 mg by mouth daily.  carvedilol (COREG) 3.125 mg tablet Take 3.125 mg by mouth two (2) times daily (with meals).  diclofenac (VOLTAREN) 1 % gel Apply 2 g to affected area every six (6) hours as needed (arthritis pain).  acetaminophen (TYLENOL) 325 mg tablet Take 650 mg by mouth every eight (8) hours as needed for Pain.  Menthol (ASPERCREME HEAT) 10 % gel by Apply Externally route every eight (8) hours as needed (arthritis).  aspirin 81 mg chewable tablet Take 81 mg by mouth daily. Indications: myocardial infarction prevention    dilTIAZem XR (DILACOR XR) 120 mg XR capsule Take 120 mg by mouth daily. Indications: hypertension, VENTRICULAR RATE CONTROL IN ATRIAL FIBRILLATION    furosemide (LASIX) 20 mg tablet Take 20 mg by mouth daily as needed. Indications: Edema, hypertension    loratadine (CLARITIN) 10 mg tablet Take 10 mg by mouth daily as needed. Indications: Allergic Rhinitis, SNEEZING    famotidine (PEPCID) 20 mg tablet Take 20 mg by mouth daily.  Indications: [Consult Letter:] : I had the pleasure of evaluating your patient, [unfilled]. Heartburn    QUEtiapine (SEROQUEL) 25 mg tablet Take 25 mg by mouth nightly. Indications: Generalized Anxiety Disorder      PAST MEDICAL HISTORY: Past medical history from the initial psychiatric evaluation has been reviewed (reviewed/updated 7/26/2018) with no additional updates (I asked patient and no additional past medical history provided). No past medical history on file. No past surgical history on file. SOCIAL HISTORY: Social history from the initial psychiatric evaluation has been reviewed (reviewed/updated 7/26/2018) with no additional updates (I asked patient and no additional social history provided). Social History     Social History    Marital status: SINGLE     Spouse name: N/A    Number of children: N/A    Years of education: N/A     Occupational History    Not on file. Social History Main Topics    Smoking status: Not on file    Smokeless tobacco: Not on file    Alcohol use Not on file    Drug use: Not on file    Sexual activity: Not on file     Other Topics Concern    Not on file     Social History Narrative      FAMILY HISTORY: Family history from the initial psychiatric evaluation has been reviewed (reviewed/updated 7/26/2018) with no additional updates (I asked patient and no additional family history provided). No family history on file.     REVIEW OF SYSTEMS: (reviewed/updated 7/26/2018)  Appetite:poor   Sleep: decreased more than normal   All other Review of Systems: Psychological ROS: positive for - behavioral disorder, concentration difficulties, disorientation and memory difficulties         [facility] [place] [place]    MSE FINDINGS ARE WITHIN NORMAL LIMITS (WNL) UNLESS OTHERWISE STATED BELOW. ( ALL OF THE BELOW CATEGORIES OF THE MSE HAVE BEEN REVIEWED (reviewed 7/26/2018) AND UPDATED AS DEEMED APPROPRIATE )  General Presentation age appropriate and casually dressed, unreliable and vague   Orientation disorganized   Vital Signs  See below [Consult Closing:] : Thank you very much for allowing me to participate in the care of this patient.  If you have any questions, please do not hesitate to contact me. (reviewed 7/26/2018); Vital Signs (BP, Pulse, & Temp) are within normal limits if not listed below. Gait and Station Stable/steady, no ataxia   Musculoskeletal System No extrapyramidal symptoms (EPS); no abnormal muscular movements or Tardive Dyskinesia (TD); muscle strength and tone are within normal limits   Language No aphasia or dysarthria   Speech:  hypoverbal   Thought Processes illogical; slow rate of thoughts; poor abstract reasoning/computation   Thought Associations tangential   Thought Content free of delusions, free of hallucinations and preoccupations   Suicidal Ideations none   Homicidal Ideations none   Mood:  Less anxious   Affect:  anxious and increased in intensity   Memory recent  impaired   Memory remote:  impaired   Concentration/Attention:  distractable   Fund of Knowledge below average   Insight:  poor   Reliability poor   Judgment:  poor          VITALS:     Patient Vitals for the past 24 hrs:   Temp Pulse Resp BP SpO2   07/26/18 1639 98.1 °F (36.7 °C) (!) 58 16 120/57 -   07/26/18 0658 98.1 °F (36.7 °C) 60 16 110/44 100 %   07/25/18 1818 - 67 - 139/74 -     Wt Readings from Last 3 Encounters:   07/17/18 54.9 kg (121 lb)     Temp Readings from Last 3 Encounters:   07/26/18 98.1 °F (36.7 °C)   06/17/18 97.6 °F (36.4 °C)   06/15/18 97.6 °F (36.4 °C)     BP Readings from Last 3 Encounters:   07/26/18 120/57   06/17/18 102/47   06/16/18 99/55     Pulse Readings from Last 3 Encounters:   07/26/18 (!) 58   06/17/18 70   06/16/18 81            DATA     LABORATORY DATA:(reviewed/updated 7/26/2018)  No results found for this or any previous visit (from the past 24 hour(s)). No results found for: VALF2, VALAC, VALP, VALPR, DS6, CRBAM, CRBAMP, CARB2, XCRBAM  No results found for: LITHM   RADIOLOGY REPORTS:(reviewed/updated 7/26/2018)  Xr Chest Port    Result Date: 6/17/2018  EXAM:  XR CHEST PORT INDICATION:  Chest Pain COMPARISON:  None.  FINDINGS: A portable AP radiograph of the chest was obtained at [FreeTextEntry2] : Penny So 720 hours. The patient is on a cardiac monitor. There is minor right basilar atelectasis. There is question of a vague right perihilar airspace process. There is slight diffuse interstitial prominence. Heart size is borderline enlarged. Patient status post median sternotomy. IMPRESSION: 1. There is suggestion of a vague right perihilar airspace process and follow-up studies would be helpful to assess for interval change. There is minor right basilar atelectasis.           MEDICATIONS     ALL MEDICATIONS:   Current Facility-Administered Medications   Medication Dose Route Frequency    dilTIAZem CD (CARDIZEM CD) capsule 120 mg  120 mg Oral DAILY    risperiDONE (RisperDAL) 1 mg/mL oral solution soln 1 mg  1 mg Oral QHS    memantine (NAMENDA) tablet 10 mg  10 mg Oral BID    ziprasidone (GEODON) 10 mg in sterile water (preservative free) 0.5 mL injection  10 mg IntraMUSCular BID PRN    benztropine (COGENTIN) tablet 1 mg  1 mg Oral BID PRN    benztropine (COGENTIN) injection 1 mg  1 mg IntraMUSCular BID PRN    acetaminophen (TYLENOL) tablet 650 mg  650 mg Oral Q4H PRN    magnesium hydroxide (MILK OF MAGNESIA) 400 mg/5 mL oral suspension 30 mL  30 mL Oral DAILY PRN    nicotine (NICODERM CQ) 21 mg/24 hr patch 1 Patch  1 Patch TransDERmal DAILY PRN    aspirin chewable tablet 81 mg  81 mg Oral DAILY    famotidine (PEPCID) tablet 20 mg  20 mg Oral DAILY    carvedilol (COREG) tablet 3.125 mg  3.125 mg Oral BID WITH MEALS      SCHEDULED MEDICATIONS:   Current Facility-Administered Medications   Medication Dose Route Frequency    dilTIAZem CD (CARDIZEM CD) capsule 120 mg  120 mg Oral DAILY    risperiDONE (RisperDAL) 1 mg/mL oral solution soln 1 mg  1 mg Oral QHS    memantine (NAMENDA) tablet 10 mg  10 mg Oral BID    aspirin chewable tablet 81 mg  81 mg Oral DAILY    famotidine (PEPCID) tablet 20 mg  20 mg Oral DAILY    carvedilol (COREG) tablet 3.125 mg  3.125 mg Oral BID WITH MEALS          ASSESSMENT & [Sincerely,] : Sincerely, PLAN     DIAGNOSES REQUIRING ACTIVE TREATMENT AND MONITORING: (reviewed/updated 7/26/2018)  Patient Active Hospital Problem List:   Dementia (6/17/2018)    Assessment: moderate to severe- disoriented, poor memory and irritable. Need total care. No behavior problem and accepting med    Plan:  Dylan Atwood. Risperdal    Hypotension  A: low bp  P:ct with current tx-IM to follow    7/26-: ct with current medications. Placement. In summary, Lety Zuniga, is a 66 y.o.  female who presents with a severe exacerbation of the principal diagnosis of Dementia  Patient's condition is /not stable . Patient requires continued inpatient hospitalization for further stabilization, safety monitoring and medication management. I will continue to coordinate the provision of individual, milieu, occupational, group, and substance abuse therapies to address target symptoms/diagnoses as deemed appropriate for the individual patient. A coordinated, multidisplinary treatment team round was conducted with the patient (this team consists of the nurse, psychiatric unit pharmcist,  and writer). Complete current electronic health record for patient has been reviewed today including consultant notes, ancillary staff notes, nurses and psychiatric tech notes. Suicide risk assessment completed and patient deemed to be of low risk for suicide at this time. The following regarding medications was addressed during rounds with patient:   the risks and benefits of the proposed medication. The patient was given the opportunity to ask questions. Informed consent given to the use of the above medications. Will continue to adjust psychiatric and non-psychiatric medications (see above \"medication\" section and orders section for details) as deemed appropriate & based upon diagnoses and response to treatment.      I will continue to order blood tests/labs and diagnostic tests as deemed appropriate and review results as they become [FreeTextEntry3] : Wild Jackson MD, RPVI\par Chief, Vascular Surgery\par  available (see orders for details and above listed lab/test results). I will order psychiatric records from previous [facility] hospitals to further elucidate the nature of patient's psychopathology and review once available. I will gather additional collateral information from friends, family and o/p treatment team to further elucidate the nature of patient's psychopathology and baselline level of psychiatric functioning. I certify that this patient's inpatient psychiatric hospital services furnished since the previous certification were, and continue to be, required for treatment that could reasonably be expected to improve the patient's condition, or for diagnostic study, and that the patient continues to need, on a daily basis, active treatment furnished directly by or requiring the supervision of inpatient psychiatric facility personnel. In addition, the hospital records show that services furnished were intensive treatment services, admission or related services, or equivalent services.     EXPECTED DISCHARGE DATE/DAY: TBD     DISPOSITION: Home/long term       Signed By:   Baron Trimble MD  7/26/2018

## 2021-07-28 NOTE — BH NOTES
Pt did not attend creative expression group. General Sunscreen Counseling: I highly recommend the use of broad spectrum sun screen (UVA AND UVB protection) with an SPF 30 or higher.  Physical sunscreens are preferred over chemical sunscreens as they reflect the sun’s rays instead of absorbing and scattering them.  Physical sunscreens are found in the form of the minerals, Zinc Oxide and Titanium Dioxide.  A mix of both chemical and physical is okay, as well. Applying sunscreen every morning regardless of your planned daily activity is important.  Sunscreen is not just for the beach.  Studies shows, that we are exposed to the sun 18 to 20 hours of incidental sun per week (meaning, times when you are going to and from the car, walking the dog, checking the mail, etc.)  Skin cancer is more common on the left side of the body due to the exposure that we receive while driving a car. Amount does matter!  Use a “quarter size” for areas face, neck and chest.  A “quarter size” per arm and per leg if wearing shorts, skirts or capris. If at the beach, pool and/or park reapply every two hours.  Use 1 ounce or a shot glass every two hours if wearing a bathing suit.  This means that after spending a day outside you should run through a whole 8 ounce bottle of sunscreen. Keep in mind that the worse sun hours are 10:00am thru 3:00pm.  If possible, avoid being outside within that time period. I also recommended a lip balm with a sunscreen as well. Sun protective clothing can be used in lieu of sunscreen but must be worn the entire time you are exposed to the sun's rays. Detail Level: Zone Products Recommended: Elta MD sunscreen Products

## 2022-08-04 NOTE — PROGRESS NOTES
Problem: Dementia-BEHAVIORAL HEALTH (Adult/Pediatric)  Goal: *STG: Remains safe in hospital  Outcome: Progressing Towards Goal  Patient confused and oriented to self only. No behavioral issues on shift. Meal and medication complaint. Redirectable. Incontinent. Visible on the unit interacting appropriately with staff and peers. No evidence of SI/HI/hallucinations. Participatory in groups/activities. No signs/symptoms of pain/discomfort. Continues on q15 min safety checks. Will continue to monitor and continue plan of care. Talked to Pt about ways she can flavor her water so she can increase water intake

## 2022-11-21 NOTE — BH NOTES
PSYCHIATRIC PROGRESS NOTE         Patient Name  Lavon Youngblood   Date of Birth 1940   [facility] [id number]   Medical Record Number  [id number]      Age  66 y.o. PCP Berenice Spears, MD   Admit date:  6/17/2018    Room Number  322/01  @ [facility]   Date of Service  7/25/2018           E & M PROGRESS NOTE:         HISTORY       CC:  \"confused, memory impairment\"  HISTORY OF PRESENT ILLNESS/INTERVAL HISTORY:  (reviewed/updated 7/25/2018). per initial evaluation: The patient, Lavon Youngblood, is a 68 y.o. WHITE [place]  female with a past psychiatric history significant for dementia, who presents at this time with complaints of (and/or evidence of) the following emotional symptoms: memory impairment. Additional symptomatology include disoriented to time and place, poor memory and behavior issues. pt has been incontent and need assistance with ADLs. She is irritable and easily gets loud and labile in mood. She was dc to medical unit for tx of hypotension and now back on U for further tx  The above symptoms have been present for years. These symptoms are of severe severity. These symptoms are constant  in nature. Lavon Youngblood presents/reports/evidences the following emotional symptoms today, 7/25/2018:memory impairment. The above symptoms have been present for years. These symptoms are of moderate severity. The symptoms are constant  in nature. Additional symptomatology and features include confused , disoriented to place and time. Pt is now accepting her med and need assistance with ADLs due to incontinence. No aggressive behavior and affect is bright , sleep is improving, pleasant and co operative. Received no prn.  6/25/18 she is compliant with medications and no anger issues like before and she gets confused and disoriented   6/26/18 she is doing better and mood is better and no anger issues and no SI or HI and slept well last night  6/27/18 she is the same and she is not engaging well Physical Therapy Progress Note    Visit Type: Daily Treatment Note  Visit: 10  Referring Provider: Raj Sheridan MD  Medical Diagnosis (from order): Diagnosis Information    Diagnosis  716.81 (ICD-9-CM) - M12.811 (ICD-10-CM) - Rotator cuff arthropathy of right shoulder         SUBJECTIVE                                                                                                               Patient still struggling to lift arm to 90 degrees against gravity but feels that the exercise tracking chart has been helpful. Feels that her strength is just not improving. Trying to incorporate affected arm into daily tasks.    Pain / Symptoms  - Pain rating (out of 10): Current: 2       OBJECTIVE                                                                                                                                       Treatment    Cryotherapy (88797): Cold Pack  - Location: Right shoulder  - Position: sitting  - Duration: 10 minutes      Therapeutic Exercise  4minutes warmup on UBE 2 min forward 2 min backward at level 1 resistance       Supine AAROM with cane subjectively reported less active assist than normal  Supine PROM flexion, ER, abduction in scaption     Supine assisted chest press 2x12 with therapist resistance   Supine tricep extension 2# weight X10, able to sustain shoulder at 80-90 degrees of flexion without therapist help        Supine antigravity flexion to 90 degrees with 1# weight only on eccentric lowering X6x3 tried 2# weight but was too difficult     Sitting bent over row with 3# weight X12  Sitting yellow band row push press X15  Sitting yellow band row X25  Seated bicep curls 3# weight x15    Sitting dowel jemal flexion against gravity attempted but too difficult in full range. Modified to lift to shoulder level X5 repetitions with slow lowering          ASSESSMENT                                                                                                            Patient was able to  and she has no anger issues and slept fairly last night but she was sleepy this morning and not engaging    6/28- remains confused with memory impairment. Pt has been hallucinating and paranoid- hiding behind her bed stating a women has been raped and stabbed. Received prn med. Sleep is better  6/29- episode of paranoid and mostly sun downing. Appear pleasant in the morning and no recollection of night time paranoia. unsteady gait due to sedation  6/30- pleasant and confused during the daytime, eating her breakfast. Still has paranoid behavior at night  7/1- slow progress as less sundowning and less paranoid. Pleasantly confused during the day time with no sig behavior problem  7/2- confused and disoriented with poor memory. Sun downing and worse in the evening with paranoia. 7/3- paranoid at night about a man coming to her room and killing. Fearful but pt is calm, pleasantly confused during daytime. Accepting med and no aggressive behavior  7/4- no sig behavior issue overnight and sleep is improved. Accepting med. Pleasant confused and with memory impairment  7/5- sleep is poor and remains paranoid at night seeing dead people. Need assistance with ADLs. No behavior issue  7/6- disturbed sleep last nigh when code s was called due to pt not responding- later cancelled as pt's vital were within normal and cxr was wnl to rule out aspiration. Pt is currently in bed responding to command and appear to be in no distress. Poor memory and attention  7/7-Pt was seen in her room with the RN. Presents alert, verbal, oriented to self only. Affect is flat. States she is feeling fine. Prn's used-Ambien 5 mg. No AVH or SI/HI. 7/8- Pt is incontinent of bowel and bladder, becomes irritable during ADL care. No significant changes from yesterday. Awaiting placement. 7/9- remains confused and disoriented to time and place. Pleasant and sleep is better. No behavior problem. Accepting med.    7/10- remains pleasant and confused with no progress reps and amount of weight used on all exercises today. Lifting arm in flexion against gravity in sitting was started, but remained difficult even with cane assist. AAROM is improving, but strength deficits are preventing expected range from occurring antigravity. Patient will need to remain diligent with HEP and strength exercises will need to be progressed throughout remaining therapy visits.  Pt with good effort today as we were challenging to progress her exercises.   Pain/symptoms after session (out of 10): 3  Education:   - Results of above outlined education: Verbalizes understanding    PLAN                                                                                                                           Suggestions for next session as indicated: Continue strength exercise especially with working flexion in sitting against gravity.       Goals  Long Term Goals: to be met by end of plan of care  1. 1. Patient will reach top of head for hair washing and management   Status: progressing/ongoing  2. 2. Patient will  and lift a light grocery bag for grocery management Status: progressing/ongoing  3. 3. Pt will increase strength in right shoulder to have AROM to reach to 120 degrees or greater for reaching lower cabinet shelves  Status: progressing/ongoing  4. 4. Quick DASH: Patient will complete form to reflect an improved calculated score to less than or equal to 50 to indicate patient reported improvement in function/disability/impairment. (minimal clinically important difference = 15.91)  Status: progressing/ongoing      Therapy procedure time and total treatment time can be found documented on the Time Entry flowsheet     behavior problem. Accepting med. Sleep is better. Poor memory and disoriented to time/place. 7/11- pleasant and co operative during the interview. She remains confused with poor memory. No delusional themes and sleep is improving.  7/12- pleasant and co operative. Remains disoriented, confused with poor memory. Slept well without prn med. No paranoid ideation  7/13- pleasant and confused. No behavior issue. Likes to color during group activities. Accepting med. Sleep is better  7/14- no behavior problem overnight. pleasant and with memory impairment. Need total care, bowel and bladder incontinent  7/15- no sig change in presentation, no behavior problem. Ready for placement. Memory impairment  7/16- poor memory, need total care, sleep is better and no behavior issue. 7/17- irritable when attended for ADLS/incontinence of bladder and bowel. Overall no physical aggression and pleasant and confuse. Accepting med  7/18- no sig behavior issue overnight. Accepting med and sleep is  Better. Pleasant and with memory impairment  7/19- stable presentation with brighter affect and memory impairment. Likes coloring . Sleep is erratic but appear rested. Accepting med. Need total care  7/20- no sig change in presentation. Remains confused with memory impairment. No behavior issue  7/21/18: seen today in her room, she was laying in bed, staff reported she did not eat her breakfast,no major behavioral issues,confuse due to cognitive impairment. Accpting med.  7/22/18: No significant change noted,accepting po medications, sleep and appetite is good, no agitation or aggression. 7/23/18- Ms. Skye Devries presents to rounds with flat affect, but engaging and communicative. Pleasant. Eating meals. Stable behavior. 7/24- pleasant and co operative. Appropriate smile and remains confused with poor memory. No behavior issue. 7/25- no sig behavior issue. No agitation and accepting med. Pleasant and confused. Stated [facility]". [facility]      SIDE EFFECTS: (reviewed/updated 7/25/2018)  None reported or admitted to. No noted toxicity with use of current med   ALLERGIES:(reviewed/updated 7/25/2018)  Allergies   Allergen Reactions    Cephalosporins Itching    Codeine Hives    Erythromycin Hives    Nubain [Nalbuphine] Hives    Pcn [Penicillins] Hives    Percodan [Oxycodone Hcl-Oxycodone-Asa] Hives    Promethazine Hives    Protonix [Pantoprazole] Diarrhea    Sulfa (Sulfonamide Antibiotics) Hives    Toradol [Ketorolac] Hives    Ultram [Tramadol] Hives    Valium [Diazepam] Hives    Prilosec [Omeprazole] Itching      MEDICATIONS PRIOR TO ADMISSION:(reviewed/updated 7/25/2018)  Prescriptions Prior to Admission   Medication Sig    multivit-minerals/folic acid (WOMEN'S MULTIVITAMIN GUMMIES PO) Take 1 Tab by mouth daily.  QUEtiapine (SEROQUEL) 25 mg tablet Take 12.5 mg by mouth daily.  carvedilol (COREG) 3.125 mg tablet Take 3.125 mg by mouth two (2) times daily (with meals).  diclofenac (VOLTAREN) 1 % gel Apply 2 g to affected area every six (6) hours as needed (arthritis pain).  acetaminophen (TYLENOL) 325 mg tablet Take 650 mg by mouth every eight (8) hours as needed for Pain.  Menthol (ASPERCREME HEAT) 10 % gel by Apply Externally route every eight (8) hours as needed (arthritis).  aspirin 81 mg chewable tablet Take 81 mg by mouth daily. Indications: myocardial infarction prevention    dilTIAZem XR (DILACOR XR) 120 mg XR capsule Take 120 mg by mouth daily. Indications: hypertension, VENTRICULAR RATE CONTROL IN ATRIAL FIBRILLATION    furosemide (LASIX) 20 mg tablet Take 20 mg by mouth daily as needed. Indications: Edema, hypertension    loratadine (CLARITIN) 10 mg tablet Take 10 mg by mouth daily as needed. Indications: Allergic Rhinitis, SNEEZING    famotidine (PEPCID) 20 mg tablet Take 20 mg by mouth daily. Indications: Heartburn    QUEtiapine (SEROQUEL) 25 mg tablet Take 25 mg by mouth nightly.  Indications: Generalized Anxiety Disorder      PAST MEDICAL HISTORY: Past medical history from the initial psychiatric evaluation has been reviewed (reviewed/updated 7/25/2018) with no additional updates (I asked patient and no additional past medical history provided). No past medical history on file. No past surgical history on file. SOCIAL HISTORY: Social history from the initial psychiatric evaluation has been reviewed (reviewed/updated 7/25/2018) with no additional updates (I asked patient and no additional social history provided). Social History     Social History    Marital status: SINGLE     Spouse name: N/A    Number of children: N/A    Years of education: N/A     Occupational History    Not on file. Social History Main Topics    Smoking status: Not on file    Smokeless tobacco: Not on file    Alcohol use Not on file    Drug use: Not on file    Sexual activity: Not on file     Other Topics Concern    Not on file     Social History Narrative      FAMILY HISTORY: Family history from the initial psychiatric evaluation has been reviewed (reviewed/updated 7/25/2018) with no additional updates (I asked patient and no additional family history provided). No family history on file. REVIEW OF SYSTEMS: (reviewed/updated 7/25/2018)  Appetite:poor   Sleep: decreased more than normal   All other Review of Systems: Psychological ROS: positive for - behavioral disorder, concentration difficulties, disorientation and memory difficulties         [facility] (MSE):    MSE FINDINGS ARE WITHIN NORMAL LIMITS (WNL) UNLESS OTHERWISE STATED BELOW. ( ALL OF THE BELOW CATEGORIES OF THE MSE HAVE BEEN REVIEWED (reviewed 7/25/2018) AND UPDATED AS DEEMED APPROPRIATE )  General Presentation age appropriate and casually dressed, unreliable and vague   Orientation disorganized   Vital Signs  See below (reviewed 7/25/2018);  Vital Signs (BP, Pulse, & Temp) are within normal limits if not listed below.   Gait and Station Stable/steady, no ataxia   Musculoskeletal System No extrapyramidal symptoms (EPS); no abnormal muscular movements or Tardive Dyskinesia (TD); muscle strength and tone are within normal limits   Language No aphasia or dysarthria   Speech:  hypoverbal   Thought Processes illogical; slow rate of thoughts; poor abstract reasoning/computation   Thought Associations tangential   Thought Content free of delusions, free of hallucinations and preoccupations   Suicidal Ideations none   Homicidal Ideations none   Mood:  Less anxious   Affect:  anxious and increased in intensity   Memory recent  impaired   Memory remote:  impaired   Concentration/Attention:  distractable   Fund of Knowledge below average   Insight:  poor   Reliability poor   Judgment:  poor          VITALS:     Patient Vitals for the past 24 hrs:   Temp Pulse Resp BP SpO2   07/25/18 0832 - 71 - 127/66 -   07/25/18 0628 - (!) 55 16 (!) 99/38 -   07/24/18 1728 - 85 16 119/52 98 %     Wt Readings from Last 3 Encounters:   07/17/18 54.9 kg (121 lb)     Temp Readings from Last 3 Encounters:   06/17/18 97.6 °F (36.4 °C)   06/15/18 97.6 °F (36.4 °C)     BP Readings from Last 3 Encounters:   07/25/18 127/66   06/17/18 102/47   06/16/18 99/55     Pulse Readings from Last 3 Encounters:   07/25/18 71   06/17/18 70   06/16/18 81            DATA     LABORATORY DATA:(reviewed/updated 7/25/2018)  No results found for this or any previous visit (from the past 24 hour(s)). No results found for: VALF2, VALAC, VALP, VALPR, DS6, CRBAM, CRBAMP, CARB2, XCRBAM  No results found for: LITHM   RADIOLOGY REPORTS:(reviewed/updated 7/25/2018)  Xr Chest Port    Result Date: 6/17/2018  EXAM:  XR CHEST PORT INDICATION:  Chest Pain COMPARISON:  None. FINDINGS: A portable AP radiograph of the chest was obtained at 720 hours. The patient is on a cardiac monitor. There is minor right basilar atelectasis. There is question of a vague right perihilar airspace process. There is slight diffuse interstitial prominence. Heart size is borderline enlarged. Patient status post median sternotomy. IMPRESSION: 1. There is suggestion of a vague right perihilar airspace process and follow-up studies would be helpful to assess for interval change. There is minor right basilar atelectasis.           MEDICATIONS     ALL MEDICATIONS:   Current Facility-Administered Medications   Medication Dose Route Frequency    dilTIAZem CD (CARDIZEM CD) capsule 120 mg  120 mg Oral DAILY    risperiDONE (RisperDAL) 1 mg/mL oral solution soln 1 mg  1 mg Oral QHS    memantine (NAMENDA) tablet 10 mg  10 mg Oral BID    ziprasidone (GEODON) 10 mg in sterile water (preservative free) 0.5 mL injection  10 mg IntraMUSCular BID PRN    benztropine (COGENTIN) tablet 1 mg  1 mg Oral BID PRN    benztropine (COGENTIN) injection 1 mg  1 mg IntraMUSCular BID PRN    acetaminophen (TYLENOL) tablet 650 mg  650 mg Oral Q4H PRN    magnesium hydroxide (MILK OF MAGNESIA) 400 mg/5 mL oral suspension 30 mL  30 mL Oral DAILY PRN    nicotine (NICODERM CQ) 21 mg/24 hr patch 1 Patch  1 Patch TransDERmal DAILY PRN    aspirin chewable tablet 81 mg  81 mg Oral DAILY    famotidine (PEPCID) tablet 20 mg  20 mg Oral DAILY    carvedilol (COREG) tablet 3.125 mg  3.125 mg Oral BID WITH MEALS      SCHEDULED MEDICATIONS:   Current Facility-Administered Medications   Medication Dose Route Frequency    dilTIAZem CD (CARDIZEM CD) capsule 120 mg  120 mg Oral DAILY    risperiDONE (RisperDAL) 1 mg/mL oral solution soln 1 mg  1 mg Oral QHS    memantine (NAMENDA) tablet 10 mg  10 mg Oral BID    aspirin chewable tablet 81 mg  81 mg Oral DAILY    famotidine (PEPCID) tablet 20 mg  20 mg Oral DAILY    carvedilol (COREG) tablet 3.125 mg  3.125 mg Oral BID WITH MEALS          ASSESSMENT & PLAN     DIAGNOSES REQUIRING ACTIVE TREATMENT AND MONITORING: (reviewed/updated 7/25/2018)  Patient Active Hospital Problem List:   Dementia (6/17/2018) Assessment: moderate to severe- disoriented, poor memory and irritable. Need total care. No behavior problem and accepting med    Plan:  Benji Hoover. Risperdal    Hypotension  A: low bp  P:ct with current tx-IM to follow    7/25-: ct with current medication. Placement. In summary, Lamin Ricks, is a 66 y.o.  female who presents with a severe exacerbation of the principal diagnosis of Dementia  Patient's condition is /not stable . Patient requires continued inpatient hospitalization for further stabilization, safety monitoring and medication management. I will continue to coordinate the provision of individual, milieu, occupational, group, and substance abuse therapies to address target symptoms/diagnoses as deemed appropriate for the individual patient. A coordinated, multidisplinary treatment team round was conducted with the patient (this team consists of the nurse, psychiatric unit pharmcist,  and writer). Complete current electronic health record for patient has been reviewed today including consultant notes, ancillary staff notes, nurses and psychiatric tech notes. Suicide risk assessment completed and patient deemed to be of low risk for suicide at this time. The following regarding medications was addressed during rounds with patient:   the risks and benefits of the proposed medication. The patient was given the opportunity to ask questions. Informed consent given to the use of the above medications. Will continue to adjust psychiatric and non-psychiatric medications (see above \"medication\" section and orders section for details) as deemed appropriate & based upon diagnoses and response to treatment. I will continue to order blood tests/labs and diagnostic tests as deemed appropriate and review results as they become available (see orders for details and above listed lab/test results).     I will order psychiatric records from previous [facility] hospitals to further elucidate the nature of patient's psychopathology and review once available. I will gather additional collateral information from friends, family and o/p treatment team to further elucidate the nature of patient's psychopathology and baselline level of psychiatric functioning. I certify that this patient's inpatient psychiatric hospital services furnished since the previous certification were, and continue to be, required for treatment that could reasonably be expected to improve the patient's condition, or for diagnostic study, and that the patient continues to need, on a daily basis, active treatment furnished directly by or requiring the supervision of inpatient psychiatric facility personnel. In addition, the hospital records show that services furnished were intensive treatment services, admission or related services, or equivalent services.     EXPECTED DISCHARGE DATE/DAY: TBD     DISPOSITION: Home/[facility]       Signed By:   Francie Almaraz MD  7/25/2018
